# Patient Record
Sex: MALE | Race: WHITE | Employment: FULL TIME | URBAN - METROPOLITAN AREA
[De-identification: names, ages, dates, MRNs, and addresses within clinical notes are randomized per-mention and may not be internally consistent; named-entity substitution may affect disease eponyms.]

---

## 2017-01-22 ENCOUNTER — APPOINTMENT (OUTPATIENT)
Dept: GENERAL RADIOLOGY | Facility: HOSPITAL | Age: 52
DRG: 202 | End: 2017-01-22
Attending: EMERGENCY MEDICINE
Payer: COMMERCIAL

## 2017-01-22 ENCOUNTER — APPOINTMENT (OUTPATIENT)
Dept: CT IMAGING | Facility: HOSPITAL | Age: 52
DRG: 202 | End: 2017-01-22
Attending: EMERGENCY MEDICINE
Payer: COMMERCIAL

## 2017-01-22 ENCOUNTER — HOSPITAL ENCOUNTER (INPATIENT)
Facility: HOSPITAL | Age: 52
LOS: 8 days | Discharge: HOME | DRG: 202 | End: 2017-01-30
Attending: EMERGENCY MEDICINE | Admitting: INTERNAL MEDICINE
Payer: COMMERCIAL

## 2017-01-22 DIAGNOSIS — J45.901 ASTHMA EXACERBATION: Primary | ICD-10-CM

## 2017-01-22 DIAGNOSIS — B97.4 RESPIRATORY SYNCYTIAL VIRUS (RSV): ICD-10-CM

## 2017-01-22 DIAGNOSIS — I10 ESSENTIAL HYPERTENSION: ICD-10-CM

## 2017-01-22 PROBLEM — Z91.81 AT RISK FOR FALLING: Status: ACTIVE | Noted: 2017-01-22

## 2017-01-22 PROBLEM — B33.8 RESPIRATORY SYNCYTIAL VIRUS (RSV): Status: ACTIVE | Noted: 2017-01-22

## 2017-01-22 LAB
ADENOVIRUS PCR:: NEGATIVE
ALBUMIN SERPL-MCNC: 3.8 G/DL (ref 3.5–4.8)
ALP LIVER SERPL-CCNC: 71 U/L (ref 45–117)
ALT SERPL-CCNC: 41 U/L (ref 17–63)
AST SERPL-CCNC: 20 U/L (ref 15–41)
B PERT DNA SPEC QL NAA+PROBE: NEGATIVE
BASOPHILS # BLD AUTO: 0.03 X10(3) UL (ref 0–0.1)
BASOPHILS NFR BLD AUTO: 0.3 %
BILIRUB SERPL-MCNC: 0.4 MG/DL (ref 0.1–2)
BUN BLD-MCNC: 15 MG/DL (ref 8–20)
C PNEUM DNA SPEC QL NAA+PROBE: NEGATIVE
CALCIUM BLD-MCNC: 8.9 MG/DL (ref 8.3–10.3)
CHLORIDE: 102 MMOL/L (ref 101–111)
CO2: 25 MMOL/L (ref 22–32)
CORONAVIRUS 229E PCR:: NEGATIVE
CORONAVIRUS HKU1 PCR:: NEGATIVE
CORONAVIRUS NL63 PCR:: NEGATIVE
CORONAVIRUS OC43 PCR:: NEGATIVE
CREAT BLD-MCNC: 1.18 MG/DL (ref 0.7–1.3)
EOSINOPHIL # BLD AUTO: 0.13 X10(3) UL (ref 0–0.3)
EOSINOPHIL NFR BLD AUTO: 1.4 %
ERYTHROCYTE [DISTWIDTH] IN BLOOD BY AUTOMATED COUNT: 12.7 % (ref 11.5–16)
FLUAV H1 2009 PAND RNA SPEC QL NAA+PROBE: NEGATIVE
FLUAV H1 RNA SPEC QL NAA+PROBE: NEGATIVE
FLUAV H3 RNA SPEC QL NAA+PROBE: NEGATIVE
FLUAV RNA SPEC QL NAA+PROBE: NEGATIVE
FLUBV RNA SPEC QL NAA+PROBE: NEGATIVE
GLUCOSE BLD-MCNC: 102 MG/DL (ref 70–99)
HCT VFR BLD AUTO: 44 % (ref 37–53)
HGB BLD-MCNC: 15.3 G/DL (ref 13–17)
IMMATURE GRANULOCYTE COUNT: 0.08 X10(3) UL (ref 0–1)
IMMATURE GRANULOCYTE RATIO %: 0.9 %
LACTIC ACID: 3 MMOL/L (ref 0.5–2)
LYMPHOCYTES # BLD AUTO: 1 X10(3) UL (ref 0.9–4)
LYMPHOCYTES NFR BLD AUTO: 10.9 %
M PROTEIN MFR SERPL ELPH: 7.2 G/DL (ref 6.1–8.3)
MCH RBC QN AUTO: 32.1 PG (ref 27–33.2)
MCHC RBC AUTO-ENTMCNC: 34.8 G/DL (ref 31–37)
MCV RBC AUTO: 92.4 FL (ref 80–99)
METAPNEUMOVIRUS PCR:: NEGATIVE
MONOCYTES # BLD AUTO: 0.61 X10(3) UL (ref 0.1–0.6)
MONOCYTES NFR BLD AUTO: 6.6 %
MYCOPLASMA PNEUMONIA PCR:: NEGATIVE
NEUTROPHIL ABS PRELIM: 7.36 X10 (3) UL (ref 1.3–6.7)
NEUTROPHILS # BLD AUTO: 7.36 X10(3) UL (ref 1.3–6.7)
NEUTROPHILS NFR BLD AUTO: 79.9 %
PARAINFLUENZA 1 PCR:: NEGATIVE
PARAINFLUENZA 2 PCR:: NEGATIVE
PARAINFLUENZA 3 PCR:: NEGATIVE
PARAINFLUENZA 4 PCR:: NEGATIVE
PLATELET # BLD AUTO: 172 10(3)UL (ref 150–450)
POTASSIUM SERPL-SCNC: 4 MMOL/L (ref 3.6–5.1)
PROCALCITONIN SERPL-MCNC: <0.11 NG/ML (ref ?–0.11)
RBC # BLD AUTO: 4.76 X10(6)UL (ref 4.3–5.7)
RED CELL DISTRIBUTION WIDTH-SD: 42.4 FL (ref 35.1–46.3)
RHINOVIRUS/ENTERO PCR:: NEGATIVE
RSV RNA SPEC QL NAA+PROBE: POSITIVE
SODIUM SERPL-SCNC: 138 MMOL/L (ref 136–144)
WBC # BLD AUTO: 9.2 X10(3) UL (ref 4–13)

## 2017-01-22 PROCEDURE — 87798 DETECT AGENT NOS DNA AMP: CPT | Performed by: EMERGENCY MEDICINE

## 2017-01-22 PROCEDURE — 93010 ELECTROCARDIOGRAM REPORT: CPT

## 2017-01-22 PROCEDURE — 94640 AIRWAY INHALATION TREATMENT: CPT

## 2017-01-22 PROCEDURE — 87040 BLOOD CULTURE FOR BACTERIA: CPT | Performed by: EMERGENCY MEDICINE

## 2017-01-22 PROCEDURE — 87999 UNLISTED MICROBIOLOGY PX: CPT

## 2017-01-22 PROCEDURE — 80053 COMPREHEN METABOLIC PANEL: CPT | Performed by: EMERGENCY MEDICINE

## 2017-01-22 PROCEDURE — 94644 CONT INHLJ TX 1ST HOUR: CPT

## 2017-01-22 PROCEDURE — 85025 COMPLETE CBC W/AUTO DIFF WBC: CPT | Performed by: EMERGENCY MEDICINE

## 2017-01-22 PROCEDURE — 99285 EMERGENCY DEPT VISIT HI MDM: CPT

## 2017-01-22 PROCEDURE — 36415 COLL VENOUS BLD VENIPUNCTURE: CPT

## 2017-01-22 PROCEDURE — 84145 PROCALCITONIN (PCT): CPT | Performed by: EMERGENCY MEDICINE

## 2017-01-22 PROCEDURE — 87581 M.PNEUMON DNA AMP PROBE: CPT | Performed by: EMERGENCY MEDICINE

## 2017-01-22 PROCEDURE — 96374 THER/PROPH/DIAG INJ IV PUSH: CPT

## 2017-01-22 PROCEDURE — 87486 CHLMYD PNEUM DNA AMP PROBE: CPT | Performed by: EMERGENCY MEDICINE

## 2017-01-22 PROCEDURE — 93005 ELECTROCARDIOGRAM TRACING: CPT

## 2017-01-22 PROCEDURE — 83605 ASSAY OF LACTIC ACID: CPT | Performed by: EMERGENCY MEDICINE

## 2017-01-22 PROCEDURE — 71275 CT ANGIOGRAPHY CHEST: CPT

## 2017-01-22 PROCEDURE — 71010 XR CHEST AP/PA (1 VIEW) (CPT=71010): CPT

## 2017-01-22 PROCEDURE — 87633 RESP VIRUS 12-25 TARGETS: CPT | Performed by: EMERGENCY MEDICINE

## 2017-01-22 RX ORDER — IPRATROPIUM BROMIDE AND ALBUTEROL SULFATE 2.5; .5 MG/3ML; MG/3ML
3 SOLUTION RESPIRATORY (INHALATION) ONCE
Status: COMPLETED | OUTPATIENT
Start: 2017-01-22 | End: 2017-01-22

## 2017-01-22 RX ORDER — GARLIC EXTRACT 500 MG
1 CAPSULE ORAL DAILY
Status: DISCONTINUED | OUTPATIENT
Start: 2017-01-22 | End: 2017-01-22

## 2017-01-22 RX ORDER — LOPERAMIDE HYDROCHLORIDE 2 MG/1
2 CAPSULE ORAL 4 TIMES DAILY PRN
Status: DISCONTINUED | OUTPATIENT
Start: 2017-01-22 | End: 2017-01-23

## 2017-01-22 RX ORDER — MAGNESIUM OXIDE 400 MG (241.3 MG MAGNESIUM) TABLET
3 TABLET NIGHTLY
Status: DISCONTINUED | OUTPATIENT
Start: 2017-01-22 | End: 2017-01-30

## 2017-01-22 RX ORDER — METHYLPREDNISOLONE SODIUM SUCCINATE 125 MG/2ML
125 INJECTION, POWDER, LYOPHILIZED, FOR SOLUTION INTRAMUSCULAR; INTRAVENOUS ONCE
Status: COMPLETED | OUTPATIENT
Start: 2017-01-22 | End: 2017-01-22

## 2017-01-22 RX ORDER — AMOXICILLIN AND CLAVULANATE POTASSIUM 875; 125 MG/1; MG/1
1 TABLET, FILM COATED ORAL EVERY 12 HOURS SCHEDULED
Status: DISCONTINUED | OUTPATIENT
Start: 2017-01-22 | End: 2017-01-30

## 2017-01-22 RX ORDER — PRAVASTATIN SODIUM 20 MG
20 TABLET ORAL NIGHTLY
Status: DISCONTINUED | OUTPATIENT
Start: 2017-01-22 | End: 2017-01-30

## 2017-01-22 RX ORDER — ACYCLOVIR 50 MG/G
OINTMENT TOPICAL
Status: DISCONTINUED | OUTPATIENT
Start: 2017-01-22 | End: 2017-01-30

## 2017-01-22 RX ORDER — AMOXICILLIN AND CLAVULANATE POTASSIUM 875; 125 MG/1; MG/1
1 TABLET, FILM COATED ORAL 2 TIMES DAILY
Status: ON HOLD | COMMUNITY
Start: 2017-01-11 | End: 2017-01-22

## 2017-01-22 RX ORDER — ALBUTEROL SULFATE 2.5 MG/3ML
2.5 SOLUTION RESPIRATORY (INHALATION) EVERY 4 HOURS PRN
Status: DISCONTINUED | OUTPATIENT
Start: 2017-01-22 | End: 2017-01-23

## 2017-01-22 RX ORDER — BENZONATATE 200 MG/1
200 CAPSULE ORAL 3 TIMES DAILY PRN
Status: DISCONTINUED | OUTPATIENT
Start: 2017-01-22 | End: 2017-01-23

## 2017-01-22 RX ORDER — SODIUM CHLORIDE 9 MG/ML
INJECTION, SOLUTION INTRAVENOUS CONTINUOUS
Status: ACTIVE | OUTPATIENT
Start: 2017-01-22 | End: 2017-01-22

## 2017-01-22 RX ORDER — IBUPROFEN 200 MG
200 TABLET ORAL EVERY 6 HOURS PRN
Status: DISCONTINUED | OUTPATIENT
Start: 2017-01-22 | End: 2017-01-23

## 2017-01-22 RX ORDER — PANTOPRAZOLE SODIUM 20 MG/1
20 TABLET, DELAYED RELEASE ORAL
Status: DISCONTINUED | OUTPATIENT
Start: 2017-01-23 | End: 2017-01-30

## 2017-01-22 RX ORDER — DOXEPIN HYDROCHLORIDE 50 MG/1
1 CAPSULE ORAL DAILY
Status: DISCONTINUED | OUTPATIENT
Start: 2017-01-22 | End: 2017-01-30

## 2017-01-22 RX ORDER — CODEINE PHOSPHATE AND GUAIFENESIN 10; 100 MG/5ML; MG/5ML
5 SOLUTION ORAL EVERY 4 HOURS PRN
Status: DISCONTINUED | OUTPATIENT
Start: 2017-01-22 | End: 2017-01-23

## 2017-01-22 RX ORDER — LISINOPRIL 10 MG/1
10 TABLET ORAL DAILY
Status: DISCONTINUED | OUTPATIENT
Start: 2017-01-22 | End: 2017-01-30

## 2017-01-22 RX ORDER — FLUOXETINE HYDROCHLORIDE 20 MG/1
20 CAPSULE ORAL DAILY
Status: DISCONTINUED | OUTPATIENT
Start: 2017-01-22 | End: 2017-01-30

## 2017-01-22 RX ORDER — METHYLPREDNISOLONE 4 MG/1
4 TABLET ORAL AS DIRECTED
Status: ON HOLD | COMMUNITY
End: 2017-01-26

## 2017-01-22 RX ORDER — CETIRIZINE HYDROCHLORIDE 10 MG/1
10 TABLET ORAL DAILY
Status: DISCONTINUED | OUTPATIENT
Start: 2017-01-22 | End: 2017-01-30

## 2017-01-22 RX ORDER — MONTELUKAST SODIUM 10 MG/1
10 TABLET ORAL NIGHTLY
Status: DISCONTINUED | OUTPATIENT
Start: 2017-01-22 | End: 2017-01-30

## 2017-01-22 RX ORDER — GARLIC EXTRACT 500 MG
1 CAPSULE ORAL 2 TIMES DAILY
Status: DISCONTINUED | OUTPATIENT
Start: 2017-01-22 | End: 2017-01-30

## 2017-01-22 RX ORDER — CLONAZEPAM 0.5 MG/1
0.5 TABLET ORAL 2 TIMES DAILY PRN
Status: DISCONTINUED | OUTPATIENT
Start: 2017-01-22 | End: 2017-01-30

## 2017-01-22 RX ORDER — CLONAZEPAM 0.5 MG/1
0.5 TABLET ORAL DAILY
Status: DISCONTINUED | OUTPATIENT
Start: 2017-01-22 | End: 2017-01-22

## 2017-01-23 LAB
ATRIAL RATE: 80 BPM
BUN BLD-MCNC: 14 MG/DL (ref 8–20)
CALCIUM BLD-MCNC: 8.8 MG/DL (ref 8.3–10.3)
CHLORIDE: 103 MMOL/L (ref 101–111)
CO2: 18 MMOL/L (ref 22–32)
CREAT BLD-MCNC: 1.18 MG/DL (ref 0.7–1.3)
ERYTHROCYTE [DISTWIDTH] IN BLOOD BY AUTOMATED COUNT: 12.6 % (ref 11.5–16)
GLUCOSE BLD-MCNC: 111 MG/DL (ref 70–99)
HAV IGM SER QL: 2.1 MG/DL (ref 1.7–3)
HCT VFR BLD AUTO: 43.2 % (ref 37–53)
HGB BLD-MCNC: 15.1 G/DL (ref 13–17)
LACTIC ACID: 6 MMOL/L (ref 0.5–2)
MCH RBC QN AUTO: 32.5 PG (ref 27–33.2)
MCHC RBC AUTO-ENTMCNC: 35 G/DL (ref 31–37)
MCV RBC AUTO: 93.1 FL (ref 80–99)
P AXIS: 9 DEGREES
P-R INTERVAL: 122 MS
PLATELET # BLD AUTO: 167 10(3)UL (ref 150–450)
POTASSIUM SERPL-SCNC: 4 MMOL/L (ref 3.6–5.1)
Q-T INTERVAL: 358 MS
QRS DURATION: 84 MS
QTC CALCULATION (BEZET): 412 MS
R AXIS: 23 DEGREES
RBC # BLD AUTO: 4.64 X10(6)UL (ref 4.3–5.7)
RED CELL DISTRIBUTION WIDTH-SD: 42.9 FL (ref 35.1–46.3)
SODIUM SERPL-SCNC: 137 MMOL/L (ref 136–144)
T AXIS: 34 DEGREES
VENTRICULAR RATE: 80 BPM
WBC # BLD AUTO: 12.2 X10(3) UL (ref 4–13)

## 2017-01-23 PROCEDURE — 94640 AIRWAY INHALATION TREATMENT: CPT

## 2017-01-23 PROCEDURE — 83735 ASSAY OF MAGNESIUM: CPT | Performed by: INTERNAL MEDICINE

## 2017-01-23 PROCEDURE — 80048 BASIC METABOLIC PNL TOTAL CA: CPT | Performed by: INTERNAL MEDICINE

## 2017-01-23 PROCEDURE — 97530 THERAPEUTIC ACTIVITIES: CPT

## 2017-01-23 PROCEDURE — 83605 ASSAY OF LACTIC ACID: CPT | Performed by: INTERNAL MEDICINE

## 2017-01-23 PROCEDURE — 97161 PT EVAL LOW COMPLEX 20 MIN: CPT

## 2017-01-23 PROCEDURE — 85027 COMPLETE CBC AUTOMATED: CPT | Performed by: INTERNAL MEDICINE

## 2017-01-23 RX ORDER — IPRATROPIUM BROMIDE AND ALBUTEROL SULFATE 2.5; .5 MG/3ML; MG/3ML
3 SOLUTION RESPIRATORY (INHALATION)
Status: DISCONTINUED | OUTPATIENT
Start: 2017-01-23 | End: 2017-01-24

## 2017-01-23 RX ORDER — ALBUTEROL SULFATE 2.5 MG/3ML
2.5 SOLUTION RESPIRATORY (INHALATION)
Status: DISCONTINUED | OUTPATIENT
Start: 2017-01-23 | End: 2017-01-23

## 2017-01-23 RX ORDER — METHYLPREDNISOLONE SODIUM SUCCINATE 125 MG/2ML
125 INJECTION, POWDER, LYOPHILIZED, FOR SOLUTION INTRAMUSCULAR; INTRAVENOUS EVERY 8 HOURS
Status: DISCONTINUED | OUTPATIENT
Start: 2017-01-23 | End: 2017-01-25

## 2017-01-23 RX ORDER — CODEINE PHOSPHATE AND GUAIFENESIN 10; 100 MG/5ML; MG/5ML
10 SOLUTION ORAL EVERY 4 HOURS PRN
Status: DISCONTINUED | OUTPATIENT
Start: 2017-01-23 | End: 2017-01-30

## 2017-01-23 RX ORDER — BENZONATATE 200 MG/1
200 CAPSULE ORAL 3 TIMES DAILY
Status: DISCONTINUED | OUTPATIENT
Start: 2017-01-23 | End: 2017-01-30

## 2017-01-23 RX ORDER — IBUPROFEN 600 MG/1
600 TABLET ORAL EVERY 6 HOURS PRN
Status: DISCONTINUED | OUTPATIENT
Start: 2017-01-23 | End: 2017-01-30

## 2017-01-23 RX ORDER — LOPERAMIDE HYDROCHLORIDE 2 MG/1
4 CAPSULE ORAL 4 TIMES DAILY PRN
Status: DISCONTINUED | OUTPATIENT
Start: 2017-01-23 | End: 2017-01-30

## 2017-01-23 RX ORDER — ALBUTEROL SULFATE 2.5 MG/3ML
2.5 SOLUTION RESPIRATORY (INHALATION) EVERY 2 HOUR PRN
Status: DISCONTINUED | OUTPATIENT
Start: 2017-01-23 | End: 2017-01-30

## 2017-01-23 NOTE — H&P
[facility]    PATIENT'S NAME: Alejandro Jorge Luis   ATTENDING PHYSICIAN: Radha De León M.D.    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     ALLERGIES:  To clarithromycin, which caused confusion, although he does tolerate azithromycin; doxycycline edema.  NEUROLOGIC:  Grossly intact. LABORATORY DATA:  Nonfasting blood sugar was 102, BUN was 15 with a creatinine of 1.8, potassium was 4.0, and lactic acid was 3.0. Procalcitonin was less than 0.11.   White count was 9.2 with a hemoglobin of 15.3 and

## 2017-01-23 NOTE — ED PROVIDER NOTES
Patient Seen in: [facility] Emergency Department    History   Patient presents with:  Cough/URI    Stated Complaint: Cough, URI    HPI    52-year-old male with a history of anxiety, reflux, hypertension, sinusitis presents to the emergency departmen HISTORY      Comment tubes for bile drainage       Medications :   Dextromethorphan-Menthol (DELSYM COUGH RELIEF MT),  Use as directed in the mouth or throat. methylPREDNISolone 4 MG Oral Tablet Therapy Pack,  Take 4 mg by mouth As Directed.    Amoxicilli every morning before breakfast.   TESSALON 200 MG OR CAPS,  1 CAPSULE 3 TIMES DAILY AS NEEDED   VITAMIN D OR,  None Entered       Family History   Problem Relation Age of Onset   • Learning Disability Daughter    • Neurological Disorder Daughter      Asper Current:/72 mmHg  Pulse 72  Temp(Src) 97.1 °F (36.2 °C) (Temporal)  Resp 20  Ht 175.3 cm (5' 9\")  Wt 90.719 kg  BMI 29.52 kg/m2  SpO2 97%        Physical Exam    General:  Patient is alert and oriented x3. No acute distress.   Well-developed a Abnormality         Status                     ---------                               -----------         ------                     CBC W/ DIFFERENTIAL[[id number]]          Abnormal            Final result                 Plea Approved by: Elias Mena MD            Ct Angiography, Chest (cpt=71275)    1/22/2017  PROCEDURE:  CT ANGIOGRAM OF THE CHEST  COMPARISON:  [facility] CT ANGIOGRAPHY, CHEST (CPT=71275), 7/11/2013, 15:03.   INDICATIONS:  Cough, URI  TECHNIQUE:  IV contrast-enha telemetry floor.     MDM           Disposition and Plan     Clinical Impression:  Asthma exacerbation  (primary encounter diagnosis)  Respiratory syncytial virus (RSV)  Essential hypertension    Disposition:  Admit    Follow-up:  No follow-up provider speci

## 2017-01-23 NOTE — PAYOR COMM NOTE
Attending Physician: Jie Rutherford MD    Review Type: ADMISSION   Reviewer: Tomeka VASQUEZ       Date: January 23, 2017 - 12:23 PM  Payor: [address] [place] [address]  Authorization Number: N/A  Admit date: 1/22/2017  6:30 PM   Admitted from Emergency Dept wheezing.            MEDICATIONS ADMINISTERED IN LAST 1 DAY:  potassium chloride 20 mEq in sodium chloride 0.45 % 1,000 mL infusion     Date Action Dose Route User    1/23/2017 0854 Rate/Dose Change (none) Intravenous Kenyatta Quintero RN    1/23/2017 0947 (DUONEB) nebulizer solution 3 mL     Date Action Dose Route User    1/22/2017 2115 Given 3 mL Nebulization Contreras De La Cruz RCP      MethylPREDNISolone Sodium Succ (Solu-MEDROL) injection 125 mg     Date Action Dose Route User    1/22/2017 2116 Given 125 mg ASSESSMENT AND PLAN:     3      A 71-year-old white male with significant respiratory distress, even though not bacterial, the patient is obviously in acute distress.  We will admit him and begin respiratory therapy, steroids, and oxygen, and consul

## 2017-01-23 NOTE — PROGRESS NOTES
NURSING ADMISSION NOTE      Patient admitted via Cart  Oriented to room. Safety precautions initiated. Bed in low position. Call light in reach. Admission navigator completed. Pt updated on plan of care and verbalizes understanding.  Pt denies karina

## 2017-01-23 NOTE — CM/SW NOTE
01/23/17 1600   CM/SW Screening   Referral [address] staff; Chart review;Nursing rounds   Patient lives with Spouse   Patient Status Prior to Admission   Independent with ADLs and Mobility Yes     Patient was screened d

## 2017-01-23 NOTE — ED INITIAL ASSESSMENT (HPI)
Patient here with cough, wheezing, sinus pain/pressure, right ear pain, throat pain. Symptoms have been on and off since October. Patient states he gets sick for about 3 weeks then gets better for 2 weeks.   Patient states this is the fourth time he has b

## 2017-01-23 NOTE — CONSULTS
Pulmonary H&P/Consult       NAME: Bernard Lopez - ROOM: [address] [place] [address] - MRN: [id number] - Age: 49year old - :  1965    Date of Admission: 2017  6:30 PM  Admission Diagnosis: Respiratory syncytial virus (RSV) [B97.4]  Essential hypertension [I10] Comment hemorrhoids    HERNIA SURGERY  3/7/12    Comment laparoscopic right inguinal hernia repair with mesh by Dr. Ca Celestin @ [address] HERNIA,5+Y/O,REDUCIBL      APPENDECTOMY      CHOLECYSTECTOMY  3/3/2008    Comment with (PRINIVIL,ZESTRIL) 10 MG Oral Tab Take 10 mg by mouth daily. Disp:  Rfl:  1/22/2017 at 0800   Lovastatin 40 MG Oral Tab Take 40 mg by mouth nightly.  Disp:  Rfl:  1/21/2017 at 2200   Loperamide HCl (IMODIUM) 2 MG Oral Cap Take 2 mg by mouth 4 (four) times d Disorder Daughter      Aspergers   • Allergies Son      peanut   • Eye Problems Daughter      eye muscle surgery   • Ear Problems Daughter      hearing loss   • Cancer Father      Leukemia   • Hypertension Father    • Breast Cancer Mother    • Arthritis Mo Albuterol Sulfate (VENTOLIN) (2.5 MG/3ML) 0.083% Inhalation Nebu Soln Take 2.5 mg by nebulization every 4 (four) hours as needed for Wheezing.  Disp:  Rfl:    ClonazePAM (KLONOPIN) 0.5 MG Oral Tab Take 0.5 mg by mouth 2 (two) times daily as needed for Anx vision   HEENT:  See above  RESPIRATORY:  See above   CARDIOVASCULAR:  denies current chest pain   GI: + abdominal pain- due to coughing  :  denies dysuria or changes in stream   MUSCULOSKELETAL:  denies back pain   NEURO:  denies headaches, no strokes o Regular rate and rhythm, S1 and S2 normal, no murmur, rub   or gallop   Abdomen:     Soft, non-tender, bowel sounds active all four quadrants,     no masses, no organomegaly   Extremities:   Extremities normal, atraumatic, no cyanosis or edema   Pulses: to control asthma  5.  Sinusitis  -given chronicity may benefit from ENT consult as inpt or opt                   Christine Tena  [facility] Pulmonary and Critical Care

## 2017-01-23 NOTE — PHYSICAL THERAPY NOTE
PHYSICAL THERAPY QUICK EVALUATION - INPATIENT    Room Number: 525/525-A  Evaluation Date: 1/23/2017  Presenting Problem: asthma exacerbation  Physician Order: PT Eval and Treat    Problem List  Principal Problem:    Asthma exacerbation  Active Problems: fall risk    WEIGHT BEARING RESTRICTION  Weight Bearing Restriction: None                PAIN ASSESSMENT  Ratin  Location: Chest, back, and abdomen  Management Techniques: Activity promotion; Body mechanics;Breathing techniques;Relaxation;Repositioning with no device for first 50 feet and using IV pole for balance for remaining 100 feet. Patient reported feeling lightheaded during ambulation, recovered with rest breaks. Patient O2 SAT measured at 94% upon return to room.  Patient transferred standing to s

## 2017-01-23 NOTE — PROGRESS NOTES
01/23/17 0730   Provider Notification   Reason for Communication Critical value   Test/Procedure Name Lactic Acid 6.0   Provider Name Dr. Lucho Kim   Method of Communication Page   Response Waiting for response   Notification Time [id number]     Rn notified by

## 2017-01-23 NOTE — PLAN OF CARE
DISCHARGE PLANNING    • Discharge to home or other facility with appropriate resources Progressing        PAIN - ADULT    • Verbalizes/displays adequate comfort level or patient's stated pain goal Progressing        Patient/Family Goals    • Patient/Family plan: home with family    Back up discharge plan: home with family

## 2017-01-24 LAB
ALLENS TEST: POSITIVE
ARTERIAL BLD GAS O2 SATURATION: 94 % (ref 92–100)
ARTERIAL BLOOD GAS BASE EXCESS: -3.1
ARTERIAL BLOOD GAS HCO3: 20.8 MEQ/L (ref 22–26)
ARTERIAL BLOOD GAS PCO2: 34 MM HG (ref 35–45)
ARTERIAL BLOOD GAS PH: 7.41 (ref 7.35–7.45)
ARTERIAL BLOOD GAS PO2: 68 MM HG (ref 80–105)
CALCULATED O2 SATURATION: 94 % (ref 92–100)
CARBOXYHEMOGLOBIN: 1.1 % SAT (ref 0–3)
IONIZED CALCIUM: 1.24 MMOL/L (ref 1.12–1.32)
L/M: 3 L/MIN
LACTIC ACID ARTERIAL: 4.7 MMOL/L (ref 0.5–2)
LACTIC ACID: 3.2 MMOL/L (ref 0.5–2)
METHEMOGLOBIN: 0.7 % SAT (ref 0.4–1.5)
PATIENT TEMPERATURE: 98.4 F
POTASSIUM BLOOD GAS: 4.3 MMOL/L (ref 3.6–5.1)
SODIUM BLOOD GAS: 136 MMOL/L (ref 136–144)
TOTAL HEMOGLOBIN: 14 G/DL (ref 13.2–17.3)

## 2017-01-24 PROCEDURE — 94664 DEMO&/EVAL PT USE INHALER: CPT

## 2017-01-24 PROCEDURE — 82330 ASSAY OF CALCIUM: CPT | Performed by: INTERNAL MEDICINE

## 2017-01-24 PROCEDURE — 94640 AIRWAY INHALATION TREATMENT: CPT

## 2017-01-24 PROCEDURE — 85018 HEMOGLOBIN: CPT | Performed by: INTERNAL MEDICINE

## 2017-01-24 PROCEDURE — 83605 ASSAY OF LACTIC ACID: CPT | Performed by: INTERNAL MEDICINE

## 2017-01-24 PROCEDURE — 82375 ASSAY CARBOXYHB QUANT: CPT | Performed by: INTERNAL MEDICINE

## 2017-01-24 PROCEDURE — 83050 HGB METHEMOGLOBIN QUAN: CPT | Performed by: INTERNAL MEDICINE

## 2017-01-24 PROCEDURE — 84132 ASSAY OF SERUM POTASSIUM: CPT | Performed by: INTERNAL MEDICINE

## 2017-01-24 PROCEDURE — 36600 WITHDRAWAL OF ARTERIAL BLOOD: CPT | Performed by: INTERNAL MEDICINE

## 2017-01-24 PROCEDURE — 82803 BLOOD GASES ANY COMBINATION: CPT | Performed by: INTERNAL MEDICINE

## 2017-01-24 PROCEDURE — 84295 ASSAY OF SERUM SODIUM: CPT | Performed by: INTERNAL MEDICINE

## 2017-01-24 RX ORDER — BUDESONIDE 0.5 MG/2ML
0.5 INHALANT ORAL 2 TIMES DAILY
Status: DISCONTINUED | OUTPATIENT
Start: 2017-01-24 | End: 2017-01-30

## 2017-01-24 RX ORDER — IPRATROPIUM BROMIDE AND ALBUTEROL SULFATE 2.5; .5 MG/3ML; MG/3ML
3 SOLUTION RESPIRATORY (INHALATION)
Status: DISCONTINUED | OUTPATIENT
Start: 2017-01-24 | End: 2017-01-26

## 2017-01-24 NOTE — PROGRESS NOTES
[id number] [facility] Patient Status:  Inpatient    1965 MRN [id number]   [facility] 5NW-A Attending Jabier Bazan MD   Hosp Day # 2 PCP Eduardo Nobles MD     Pulm / Critical Care Progress Note     S: pt reports on atraumatic. Lungs: coarse with some exp wheezing   Chest wall: No tenderness or deformity. Heart: slightly tachy, regular, normal S1S2, no murmur. Abdomen: soft, non-tender, non-distended, positive BS.    Extremity: no edema       Recent Labs   Lab  0

## 2017-01-24 NOTE — PLAN OF CARE
DISCHARGE PLANNING    • Discharge to home or other facility with appropriate resources Progressing        Impaired Functional Mobility    • Achieve highest/safest level of mobility/gait Progressing        PAIN - ADULT    • Verbalizes/displays adequate comf

## 2017-01-24 NOTE — PROGRESS NOTES
Patient presented semi-supine in bed; VSS and agreeable to participate. Transferred supine>sit and sit>stand w/ supervision assist.  Ambulated 300' w/IV Pole and supervision assist.  During activity, patient reported new onset 5/10 LLQ back pain.   Upon co

## 2017-01-24 NOTE — PROGRESS NOTES
[facility]    Progress Note    Wilrfido Morales Patient Status:  Inpatient    1965 MRN [id number]   [facility] 5NW-A Attending Anastasiia Don MD   Hosp Day # 2 PCP Alicia Mayes MD       SUBJECTIVE:  TRIED TO SPACE [place] [address] PRN   Fluticasone Furoate-Vilanterol (BREO ELLIPTA) 200-25 MCG/INH inhaler 1 puff 1 puff Inhalation Daily   benzonatate (TESSALON) cap 200 mg 200 mg Oral TID   acyclovir (ZOVIRAX) 5 % ointment  Topical Q4H While awake   cholecalciferol (VITAMIN D3) cap/tab

## 2017-01-25 LAB — GLUCOSE BLD-MCNC: 132 MG/DL (ref 65–99)

## 2017-01-25 PROCEDURE — 94640 AIRWAY INHALATION TREATMENT: CPT

## 2017-01-25 PROCEDURE — 82962 GLUCOSE BLOOD TEST: CPT

## 2017-01-25 PROCEDURE — 84132 ASSAY OF SERUM POTASSIUM: CPT | Performed by: INTERNAL MEDICINE

## 2017-01-25 PROCEDURE — 87081 CULTURE SCREEN ONLY: CPT | Performed by: INTERNAL MEDICINE

## 2017-01-25 PROCEDURE — 87430 STREP A AG IA: CPT | Performed by: INTERNAL MEDICINE

## 2017-01-25 RX ORDER — METHYLPREDNISOLONE SODIUM SUCCINATE 125 MG/2ML
60 INJECTION, POWDER, LYOPHILIZED, FOR SOLUTION INTRAMUSCULAR; INTRAVENOUS EVERY 8 HOURS
Status: DISCONTINUED | OUTPATIENT
Start: 2017-01-25 | End: 2017-01-26

## 2017-01-25 RX ORDER — FUROSEMIDE 10 MG/ML
20 INJECTION INTRAMUSCULAR; INTRAVENOUS ONCE
Status: COMPLETED | OUTPATIENT
Start: 2017-01-25 | End: 2017-01-25

## 2017-01-25 NOTE — PROGRESS NOTES
[address] [facility] Patient Status:  Inpatient    1965 MRN [id number]   [facility] 5NW-A Attending Ricky Lawson MD   Hosp Day # 3 PCP Elida Valles MD     Pulm / Critical Care Progress Note     S:  Pt states he coarseness   Chest wall: No tenderness or deformity. Heart: Regular rate and rhythm, normal S1S2, no murmur. Abdomen: soft, non-tender, non-distended, positive BS.    Extremity: no edema         Recent Labs   Lab  01/22/17   1900  01/23/17   0658   WBC

## 2017-01-25 NOTE — PROGRESS NOTES
[facility]    Progress Note    Radha Trevino Patient Status:  Inpatient    1965 MRN [id number]   [facility] 5NW-A Attending Milka Galindo MD   Hosp Day # 3 PCP Radha De León MD       SUBJECTIVE:  NOTES IMPROVED PEAK F MCG/INH inhaler 1 puff 1 puff Inhalation Daily   benzonatate (TESSALON) cap 200 mg 200 mg Oral TID   acyclovir (ZOVIRAX) 5 % ointment  Topical Q4H While awake   cholecalciferol (VITAMIN D3) cap/tab 2,000 Units 2,000 Units Oral Daily   cetirizine (ZYRTEC) t

## 2017-01-25 NOTE — PLAN OF CARE
DISCHARGE PLANNING    • Discharge to home or other facility with appropriate resources Progressing        Impaired Functional Mobility    • Achieve highest/safest level of mobility/gait Progressing        PAIN - ADULT    • Verbalizes/displays adequate comf Asthma exacerbation     Respiratory syncytial virus (RSV)     Essential hypertension     At risk for falling       Active issue(s) requiring resolution prior to discharge: IV fluids/steroids, oxygen requirements     Anticipated discharge date: TBD    Curr

## 2017-01-25 NOTE — PAYOR COMM NOTE
Attending Physician: Milka Galindo MD    Review Type: CONTINUED STAY  Reviewer: Keith VASQUEZ     Date: January 25, 2017 - 10:59 AM  Payor: [address] [place] [address]  Authorization Number: N/A  Admit date: 1/22/2017  6:30 PM        REVIEWER COMMENTS Von Marr, YANE      ipratropium-albuterol (DUONEB) nebulizer solution 3 mL q 3 hr      Date Action Dose Route User    1/25/2017 1048 Given 3 mL Nebulization Elizabeth Fallon RCP    1/25/2017 4848 Given 3 mL Nebulization [place], [place], [facility]    1/

## 2017-01-26 ENCOUNTER — APPOINTMENT (OUTPATIENT)
Dept: GENERAL RADIOLOGY | Facility: HOSPITAL | Age: 52
DRG: 202 | End: 2017-01-26
Attending: INTERNAL MEDICINE
Payer: COMMERCIAL

## 2017-01-26 LAB
BASOPHILS # BLD AUTO: 0.01 X10(3) UL (ref 0–0.1)
BASOPHILS NFR BLD AUTO: 0.1 %
BUN BLD-MCNC: 28 MG/DL (ref 8–20)
CALCIUM BLD-MCNC: 8.9 MG/DL (ref 8.3–10.3)
CHLORIDE: 101 MMOL/L (ref 101–111)
CO2: 29 MMOL/L (ref 22–32)
CREAT BLD-MCNC: 1.12 MG/DL (ref 0.7–1.3)
EOSINOPHIL # BLD AUTO: 0 X10(3) UL (ref 0–0.3)
EOSINOPHIL NFR BLD AUTO: 0 %
ERYTHROCYTE [DISTWIDTH] IN BLOOD BY AUTOMATED COUNT: 12.4 % (ref 11.5–16)
GLUCOSE BLD-MCNC: 152 MG/DL (ref 70–99)
HCT VFR BLD AUTO: 40.9 % (ref 37–53)
HGB BLD-MCNC: 13.7 G/DL (ref 13–17)
IMMATURE GRANULOCYTE COUNT: 0.25 X10(3) UL (ref 0–1)
IMMATURE GRANULOCYTE RATIO %: 1.9 %
LYMPHOCYTES # BLD AUTO: 0.81 X10(3) UL (ref 0.9–4)
LYMPHOCYTES NFR BLD AUTO: 6.2 %
MCH RBC QN AUTO: 32 PG (ref 27–33.2)
MCHC RBC AUTO-ENTMCNC: 33.5 G/DL (ref 31–37)
MCV RBC AUTO: 95.6 FL (ref 80–99)
MONOCYTES # BLD AUTO: 0.97 X10(3) UL (ref 0.1–0.6)
MONOCYTES NFR BLD AUTO: 7.4 %
NEUTROPHIL ABS PRELIM: 11.07 X10 (3) UL (ref 1.3–6.7)
NEUTROPHILS # BLD AUTO: 11.07 X10(3) UL (ref 1.3–6.7)
NEUTROPHILS NFR BLD AUTO: 84.4 %
PLATELET # BLD AUTO: 153 10(3)UL (ref 150–450)
POTASSIUM SERPL-SCNC: 4.4 MMOL/L (ref 3.6–5.1)
POTASSIUM SERPL-SCNC: 4.5 MMOL/L (ref 3.6–5.1)
RBC # BLD AUTO: 4.28 X10(6)UL (ref 4.3–5.7)
RED CELL DISTRIBUTION WIDTH-SD: 43.6 FL (ref 35.1–46.3)
SODIUM SERPL-SCNC: 138 MMOL/L (ref 136–144)
WBC # BLD AUTO: 13.1 X10(3) UL (ref 4–13)

## 2017-01-26 PROCEDURE — 85025 COMPLETE CBC W/AUTO DIFF WBC: CPT | Performed by: INTERNAL MEDICINE

## 2017-01-26 PROCEDURE — 80048 BASIC METABOLIC PNL TOTAL CA: CPT | Performed by: INTERNAL MEDICINE

## 2017-01-26 PROCEDURE — 94640 AIRWAY INHALATION TREATMENT: CPT

## 2017-01-26 PROCEDURE — 71020 XR CHEST PA + LAT CHEST (CPT=71020): CPT

## 2017-01-26 PROCEDURE — 84132 ASSAY OF SERUM POTASSIUM: CPT | Performed by: INTERNAL MEDICINE

## 2017-01-26 RX ORDER — BUDESONIDE AND FORMOTEROL FUMARATE DIHYDRATE 160; 4.5 UG/1; UG/1
2 AEROSOL RESPIRATORY (INHALATION) 2 TIMES DAILY
Qty: 1 INHALER | Refills: 1 | Status: SHIPPED | OUTPATIENT
Start: 2017-01-26 | End: 2017-02-08 | Stop reason: ALTCHOICE

## 2017-01-26 RX ORDER — PREDNISONE 10 MG/1
TABLET ORAL
Qty: 30 TABLET | Refills: 0 | Status: SHIPPED | OUTPATIENT
Start: 2017-01-26 | End: 2017-02-08 | Stop reason: ALTCHOICE

## 2017-01-26 RX ORDER — IPRATROPIUM BROMIDE AND ALBUTEROL SULFATE 2.5; .5 MG/3ML; MG/3ML
3 SOLUTION RESPIRATORY (INHALATION)
Status: DISCONTINUED | OUTPATIENT
Start: 2017-01-26 | End: 2017-01-30

## 2017-01-26 RX ORDER — BENZONATATE 200 MG/1
200 CAPSULE ORAL 3 TIMES DAILY PRN
Qty: 30 CAPSULE | Refills: 0 | Status: SHIPPED | OUTPATIENT
Start: 2017-01-26 | End: 2017-02-15 | Stop reason: ALTCHOICE

## 2017-01-26 RX ORDER — PREDNISONE 20 MG/1
40 TABLET ORAL
Status: DISCONTINUED | OUTPATIENT
Start: 2017-01-27 | End: 2017-01-28

## 2017-01-26 NOTE — PLAN OF CARE
SPO2% on Room Air at rest: 98%    SPO2% Ambulation on Room Air: 96%    SPO2% Ambulation on O2: 96% on 0 Liters per minute (room air)

## 2017-01-26 NOTE — PLAN OF CARE
DISCHARGE PLANNING    • Discharge to home or other facility with appropriate resources Progressing        Impaired Functional Mobility    • Achieve highest/safest level of mobility/gait Progressing        PAIN - ADULT    • Verbalizes/displays adequate comf ibuprofen administered with relief. Eye drops ordered for dry eyes, administered with relief. Patient updated on POC, rounded on routinely.

## 2017-01-26 NOTE — PROGRESS NOTES
Patient presented sitting EOB; VSS (98% O2/RA) and agreeable to participate.   Transferred sit >stand with supervision assist.  Ambulated 350' w/IV pole and stand by assist.  Patient maintained 96% O2/RA throughout activity, but required numerous standing r

## 2017-01-26 NOTE — PROGRESS NOTES
[address] [facility] Patient Status:  Inpatient    1965 MRN [id number]   [facility] 5NW-A Attending Harriet Odonnell MD   [facility] Day # 4 PCP Kely Smiley MD     Pulm / Critical Care Progress Note     S: pt feeling be tenderness or deformity. Heart: Regular rate and rhythm, normal S1S2, no murmur. Abdomen: soft, non-tender, non-distended, positive BS.    Extremity: no edema         Recent Labs   Lab  01/26/17   0645   WBC  13.1*   HGB  13.7   HCT  40.9   PLT  153.0

## 2017-01-27 ENCOUNTER — APPOINTMENT (OUTPATIENT)
Dept: GENERAL RADIOLOGY | Facility: HOSPITAL | Age: 52
DRG: 202 | End: 2017-01-27
Attending: INTERNAL MEDICINE
Payer: COMMERCIAL

## 2017-01-27 PROCEDURE — 94664 DEMO&/EVAL PT USE INHALER: CPT

## 2017-01-27 PROCEDURE — 71110 X-RAY EXAM RIBS BIL 3 VIEWS: CPT

## 2017-01-27 PROCEDURE — 94640 AIRWAY INHALATION TREATMENT: CPT

## 2017-01-27 RX ORDER — HYDROCODONE BITARTRATE AND ACETAMINOPHEN 5; 325 MG/1; MG/1
1 TABLET ORAL EVERY 6 HOURS PRN
Status: DISCONTINUED | OUTPATIENT
Start: 2017-01-27 | End: 2017-01-30

## 2017-01-27 NOTE — PLAN OF CARE
DISCHARGE PLANNING    • Discharge to home or other facility with appropriate resources Progressing        Impaired Functional Mobility    • Achieve highest/safest level of mobility/gait Progressing        PAIN - ADULT    • Verbalizes/displays adequate comf intermittently. Patient with strong cough, prn cough suppressant administered as ordered with relief. Patient updated on POC, rounded on routinely.

## 2017-01-27 NOTE — PAYOR COMM NOTE
Attending Physician: Milka Galindo MD    Review Type: CONTINUED STAY  Reviewer: Keith VASQUEZ     Date: January 27, 2017 - 11:56 AM  Payor: [address] [place] [address]  Authorization Number: [id number]  Admit date: 1/22/2017  6:30 PM        REVIEWER ANDRESSA 1/26/2017 2345 Given 3 mL Nebulization Odessa Jaquez Fall    1/26/2017 1928 Given 3 mL Nebulization Fuad Bowman RCP    1/26/2017 1522 Given 3 mL Nebulization Fuad Bowman RCP      MethylPREDNISolone Sodium Succ (Solu-MEDROL) injection 60 mg     Date A

## 2017-01-27 NOTE — PLAN OF CARE
SPO2% on Room Air at rest: 97%    SPO2% Ambulation on Room Air: 95%    SPO2% Ambulation on O2: 95% on 0 Liters per minute (room air)

## 2017-01-27 NOTE — PROGRESS NOTES
[facility]    Progress Note    Georgiana Babb Patient Status:  Inpatient    1965 MRN [id number]   [facility] 5NW-A Attending Bre Snow MD   Hosp Day # 4 PCP Fiona Henriquez MD       SUBJECTIVE:  PT DEVELOPED EDEMA LA bases.           Dictated by: Carmen Contreras MD on 1/26/2017 at 8:52       Approved by:  Carmen Contreras MD         Meds:     Current Facility-Administered Medications:  Hypromellose (NATURAL TEARS) 0.4 % ophthalmic solution 1 drop 1 drop Both Eyes QID PRN Essential hypertension, benign     IBS (irritable bowel syndrome)     GERD (gastroesophageal reflux disease)     Recurrent sinusitis     NATALYA (obstructive sleep apnea)     Vitamin D deficiency     Inguinal hernia, right     Status post right inguinal hernia

## 2017-01-27 NOTE — PROGRESS NOTES
Pulmonary Progress Note      NAME: Morris Wynn - ROOM: 117/433-D - MRN: [id number] - Age: 49year old - : 1965    Assessment/Plan:  1.  Dyspnea/hypoxia/Cough - CTA PE protocol negative  -due to asthma exacerbation brought on by RSV and ?sinus inf 3 mg Oral Nightly   • Montelukast Sodium  10 mg Oral Nightly   • multivitamin  1 tablet Oral Daily   • Pantoprazole Sodium  20 mg Oral QAM AC   • Amoxicillin-Pot Clavulanate  1 tablet Oral 2 times per day   • Acidophilus/Pectin  1 capsule Oral BID     Munir Hutson

## 2017-01-27 NOTE — PROGRESS NOTES
[facility]    Progress Note    Bernard Lopez Patient Status:  Inpatient    1965 MRN [id number]   [facility] 5NW-A Attending Arcadio Bautista MD   Hosp Day # 5 PCP Catalina Dobbins MD       SUBJECTIVE:  CALMER    OBJECTIVE: (PULMICORT) 0.5 MG/2ML nebulizer solution 0.5 mg 0.5 mg Nebulization BID   albuterol Sulfate (VENTOLIN) (5 MG/ML) 0.5% nebulizer solution 10 mg 10 mg Nebulization Q4H PRN   Loperamide HCl (IMODIUM) cap 4 mg 4 mg Oral QID PRN   ibuprofen (MOTRIN) tab 600 mg RIB AREA PAIN-NO OBVIOUS FX; DDX MUSCLE STRAIN OR PLEURISY  ASTHMA EXACERBATION-SEVERE, SLOW RECOVERY  EDEMA-BETTER  SORE THROAT-STREP CULTURE NEG  GENL-GRADUAL IMPROVEMENT; NOT READY TODAY FOR TRANSFER        Catalina Dobbins  1/27/2017  4:16 PM

## 2017-01-28 ENCOUNTER — APPOINTMENT (OUTPATIENT)
Dept: CV DIAGNOSTICS | Facility: HOSPITAL | Age: 52
DRG: 202 | End: 2017-01-28
Attending: INTERNAL MEDICINE
Payer: COMMERCIAL

## 2017-01-28 PROCEDURE — 93306 TTE W/DOPPLER COMPLETE: CPT

## 2017-01-28 PROCEDURE — 94640 AIRWAY INHALATION TREATMENT: CPT

## 2017-01-28 PROCEDURE — 94664 DEMO&/EVAL PT USE INHALER: CPT

## 2017-01-28 PROCEDURE — 93306 TTE W/DOPPLER COMPLETE: CPT | Performed by: INTERNAL MEDICINE

## 2017-01-28 RX ORDER — HYDROCODONE BITARTRATE AND ACETAMINOPHEN 5; 325 MG/1; MG/1
1 TABLET ORAL EVERY 6 HOURS PRN
Qty: 30 TABLET | Refills: 0 | Status: SHIPPED | OUTPATIENT
Start: 2017-01-28 | End: 2017-02-15 | Stop reason: ALTCHOICE

## 2017-01-28 RX ORDER — SUCRALFATE ORAL 1 G/10ML
1 SUSPENSION ORAL
Status: DISCONTINUED | OUTPATIENT
Start: 2017-01-28 | End: 2017-01-30

## 2017-01-28 RX ORDER — CODEINE PHOSPHATE AND GUAIFENESIN 10; 100 MG/5ML; MG/5ML
10 SOLUTION ORAL 4 TIMES DAILY PRN
Qty: 480 ML | Refills: 0 | Status: SHIPPED | OUTPATIENT
Start: 2017-01-28 | End: 2017-02-15 | Stop reason: ALTCHOICE

## 2017-01-28 RX ORDER — AMOXICILLIN AND CLAVULANATE POTASSIUM 875; 125 MG/1; MG/1
1 TABLET, FILM COATED ORAL EVERY 12 HOURS SCHEDULED
Qty: 14 TABLET | Refills: 0 | Status: SHIPPED | OUTPATIENT
Start: 2017-01-28 | End: 2017-02-15 | Stop reason: ALTCHOICE

## 2017-01-28 NOTE — PLAN OF CARE
Received patient a/ox4. Continues to report unrelenting cough and a \"chalky\" feeling in his throat. No other new complaints. Spo2 on RA maintained > 92%. Patient ambulated the halls on RA and spo2 maintained 91-94%.  He was updated on plan of care and delio

## 2017-01-28 NOTE — PROGRESS NOTES
[address] [facility] Patient Status:  Inpatient    1965 MRN [id number]   [facility] 5NW-A Attending Arik Cole MD   [facility] Day # 6 PCP Joana Harden MD     SUBJECTIVE:Multiple concerns - pain Ifeoma Deutsch.   Had one Daily  •  FLUoxetine HCl (PROZAC) cap 20 mg, 20 mg, Oral, Daily  •  lisinopril (PRINIVIL,ZESTRIL) tab 10 mg, 10 mg, Oral, Daily  •  Pravastatin Sodium (PRAVACHOL) tab 20 mg, 20 mg, Oral, Nightly  •  melatonin tab TABS 3 mg, 3 mg, Oral, Nightly  •  [place] includes sprain  - no rib fx  - Ibuprofen prn  5. NATALYA  -noncompliant for the last several years  -need to address as opt as noncompliance w/ CPAP can lead to more difficult to control asthma  6.  Sinusitis  -given chronicity may benefit from ENT consult as

## 2017-01-28 NOTE — PROGRESS NOTES
[facility]    Progress Note    Tiffani Medinaischer Patient Status:  Inpatient    1965 MRN [id number]   [facility] 5NW-A Attending Gianni Rosas MD   [facility] Day # 6 PCP Caryn Martinez MD       SUBJECTIVE:  Ernestina 9387 RIGHT ibuprofen (MOTRIN) tab 600 mg 600 mg Oral Q6H PRN   guaiFENesin-codeine (ROBITUSSIN AC) 100-10 MG/5ML syrup 10 mL 10 mL Oral Q4H PRN   albuterol sulfate (VENTOLIN) (2.5 MG/3ML) 0.083% nebulizer solution 2.5 mg 2.5 mg Nebulization Q2H PRN   Fluticasone Fu AM      Yamila Archer  1/28/2017  9:27 AM

## 2017-01-29 PROCEDURE — 94640 AIRWAY INHALATION TREATMENT: CPT

## 2017-01-29 PROCEDURE — 94660 CPAP INITIATION&MGMT: CPT

## 2017-01-29 RX ORDER — ENOXAPARIN SODIUM 100 MG/ML
40 INJECTION SUBCUTANEOUS DAILY
Status: DISCONTINUED | OUTPATIENT
Start: 2017-01-29 | End: 2017-01-30

## 2017-01-29 RX ORDER — ECHINACEA PURPUREA EXTRACT 125 MG
1 TABLET ORAL
Status: DISCONTINUED | OUTPATIENT
Start: 2017-01-29 | End: 2017-01-30

## 2017-01-29 NOTE — CONSULTS
Name: Best Keyes  Consulting Physician Shamir Juarez of note sent to]: Dr. Caitlin Rg  Reason for Request: Sinus    Onset Date: 1/29/17    CHIEF COMPLAINT:   Sinusitis for years. HISTORY OF PRESENT ILLNESS:  49year old male c/o of sinusitis.   Chronic si Comment bilateral. 1987 1989    RENAL ENDOSCOPY       MED:     No current facility-administered medications on file prior to encounter.   Current Outpatient Prescriptions on File Prior to Encounter:  Fexofenadine HCl (ALLEGRA) 180 MG Oral Tab Take 180 mg by Comment:anxious  Doxycycline               Levaquin                Swelling      Social History   Marital Status:   Spouse Name: Demetrio Lou    Years of Education: 14  Number of Children: [address] PHYSICAL EXAMINATION:  General:  WD WN WM. Voice normal, full sentences. Neuro/Psych:  AOx3, NAD. Eyes:  Anicteric, EOMI, no spontaneous nystagmus. Skin: No skin lesions scalp/face. Musculoskeletal:  Normocephalic.   Sinuses nontender to call or go to the emergency room if bleeding recurs. Emma Ge M.D.   Otolaryngology - Head & Neck Surgery

## 2017-01-29 NOTE — PROGRESS NOTES
[address] [facility] Patient Status:  Inpatient    1965 MRN [id number]   [facility] 5NW-A Attending Jagruti Michel MD   [address] Day # 7 PCP Tylor Jimenez MD     SUBJECTIVE:Doing better     OBJECTIVE:  /78 mmHg FLUoxetine HCl (PROZAC) cap 20 mg, 20 mg, Oral, Daily  •  lisinopril (PRINIVIL,ZESTRIL) tab 10 mg, 10 mg, Oral, Daily  •  Pravastatin Sodium (PRAVACHOL) tab 20 mg, 20 mg, Oral, Nightly  •  melatonin tab TABS 3 mg, 3 mg, Oral, Nightly  •  Montelukast Sodium mask overnight  -bring in old study so that  can order new machine supplies tomorrow prior to d/c  5.  Sinusitis  -per ENT      Anderson Wilkins MD  1/29/2017  1:38 PM

## 2017-01-29 NOTE — PLAN OF CARE
Received patient a/ox3. Continues to c/o of cough. Spo2 on RA maintained 95-97% while sleeping. Patient and wife updated on plan of care and verbalize understanding. Vital signs stable. Will continue to monitor.     DISCHARGE PLANNING    • Discharge to home

## 2017-01-29 NOTE — PROGRESS NOTES
[facility]  Progress Note    Jaziel Gaming Patient Status:  Inpatient    1965 MRN [id number]   [facility] 5NW-A Attending Elvira Carrillo MD   [address] Day # 7 PCP Dario Marrero MD         SUBJECTIVE:  Subjective:  [address] • predniSONE  30 mg Oral Daily with breakfast   • sucralfate  1 g Oral TID AC and HS   • ipratropium-albuterol  3 mL Nebulization 6 times per day   • budesonide  0.5 mg Nebulization BID   • Fluticasone Furoate-Vilanterol  1 puff Inhalation Daily   • be

## 2017-01-30 VITALS
SYSTOLIC BLOOD PRESSURE: 122 MMHG | WEIGHT: 220.44 LBS | BODY MASS INDEX: 32.65 KG/M2 | TEMPERATURE: 99 F | HEART RATE: 80 BPM | RESPIRATION RATE: 20 BRPM | HEIGHT: 69.02 IN | DIASTOLIC BLOOD PRESSURE: 69 MMHG | OXYGEN SATURATION: 93 %

## 2017-01-30 PROCEDURE — 94640 AIRWAY INHALATION TREATMENT: CPT

## 2017-01-30 PROCEDURE — 94664 DEMO&/EVAL PT USE INHALER: CPT

## 2017-01-30 RX ORDER — ALBUTEROL SULFATE 2.5 MG/3ML
2.5 SOLUTION RESPIRATORY (INHALATION) EVERY 4 HOURS PRN
Qty: 540 ML | Refills: 0 | Status: SHIPPED | OUTPATIENT
Start: 2017-01-30 | End: 2017-09-26 | Stop reason: ALTCHOICE

## 2017-01-30 RX ORDER — PREDNISONE 20 MG/1
20 TABLET ORAL
Status: DISCONTINUED | OUTPATIENT
Start: 2017-01-31 | End: 2017-01-30

## 2017-01-30 RX ORDER — BUDESONIDE 0.5 MG/2ML
0.5 INHALANT ORAL 2 TIMES DAILY
Status: DISCONTINUED | OUTPATIENT
Start: 2017-01-30 | End: 2017-01-30

## 2017-01-30 RX ORDER — PREDNISONE 20 MG/1
20 TABLET ORAL
Qty: 2 TABLET | Refills: 0 | Status: SHIPPED | OUTPATIENT
Start: 2017-01-31 | End: 2017-02-02

## 2017-01-30 RX ORDER — IPRATROPIUM BROMIDE AND ALBUTEROL SULFATE 2.5; .5 MG/3ML; MG/3ML
3 SOLUTION RESPIRATORY (INHALATION) EVERY 4 HOURS PRN
Status: DISCONTINUED | OUTPATIENT
Start: 2017-01-30 | End: 2017-01-30

## 2017-01-30 NOTE — PROGRESS NOTES
NURSING DISCHARGE NOTE    Discharged Home via Wheelchair. Accompanied by Spouse and Support staff  Belongings Taken by patient/family. Patient discharged home per ENT, pulm and PCP. Discharge instructions given to patient.   Patient instructed to f

## 2017-01-30 NOTE — PLAN OF CARE
PAIN - ADULT    • Verbalizes/displays adequate comfort level or patient's stated pain goal Progressing        RESPIRATORY - ADULT    • Achieves optimal ventilation and oxygenation Progressing        Patient alert and appropriate. Denies chest pain.  No sign

## 2017-01-30 NOTE — PAYOR COMM NOTE
Attending Physician: No att. providers found    Review Type: CONTINUED STAY  Reviewer: Judson Campbell     Date: January 30, 2017 - 3:27 PM  Payor: [address] [facility]  Authorization Number: [id number]  Admit date: 1/22/2017  6:30 PM        [place] [facility] [address] [place] [address]

## 2017-01-30 NOTE — PROGRESS NOTES
Pulmonary Progress Note        NAME: Abril Monson - ROOM: [facility] - MRN: [id number] - Age: 49year old - : 1965    Assessment/Plan:  1.  Dyspnea/hypoxia/Cough -   -due to asthma exacerbation triggered by RSV   -O2 as needed, on room air today at 10 mg Oral Nightly   • multivitamin  1 tablet Oral Daily   • Pantoprazole Sodium  20 mg Oral QAM AC   • Amoxicillin-Pot Clavulanate  1 tablet Oral 2 times per day   • Acidophilus/Pectin  1 capsule Oral BID     Intake/Output Summary (Last 24 hours) at 01/30

## 2017-01-30 NOTE — PROGRESS NOTES
[facility]    Progress Note    Eliseo Lefort Patient Status:  Inpatient    1965 MRN [id number]   [facility] 5NW-A Attending Elroy Landrum MD   [facility] Day # 8 PCP Mardene Pallas, MD       SUBJECTIVE:  CONCERNED BECAUSE Ling Rosado MG/ML) 0.5% nebulizer solution 10 mg 10 mg Nebulization Q4H PRN   Loperamide HCl (IMODIUM) cap 4 mg 4 mg Oral QID PRN   ibuprofen (MOTRIN) tab 600 mg 600 mg Oral Q6H PRN   guaiFENesin-codeine (ROBITUSSIN AC) 100-10 MG/5ML syrup 10 mL 10 mL Oral Q4H PRN   a COULD BE DUE TO VOCAL CORD DYSFUNCTION; NEEDS OP S.T.  SINUS ISSUES- O.P F//U WITH DR. Tennille Zabala  ANXIETY-CPM          Radha De León  1/30/2017  12:12 PM

## 2017-01-30 NOTE — PROGRESS NOTES
S: patient feels nose is a little better. Saline helped with some of the crusting and congestion. No active bleeding but trying to blow his nose a lot. Ears are popping. Still feels he is having troubles with the CPAP.    O:    01/30/17  0500   BP:    P

## 2017-01-30 NOTE — CM/SW NOTE
Met with pt and wife in room. Explained need to have sleep study in order to get a CPAP covered by his insurance. Per old records- last sleep study was in March of 2009 which is out of date for insurance coverage.   Pt and wife were very anxious about elie

## 2017-01-31 NOTE — RESPIRATORY THERAPY NOTE
Set Mode :AFLEX                        Usage in Hours: 7:17                          90% Exp. Pressure(EPAP):7.0                          90% Insp. Pressure(IPAP):                          AHI :6.0

## 2017-02-01 NOTE — DISCHARGE SUMMARY
[facility]  Discharge Summary    Abril Monson Patient Status:  Inpatient    1965 MRN [id number]   [facility] 5NW-A Attending No att. providers found   Hosp Day # 8 PCP Suzan Tanner MD     Date of Admission: 2017    Simon Tab  Take 1 tablet (20 mg total) by mouth daily with breakfast., Normal, Disp-2 tablet, R-0    HYDROcodone-acetaminophen 5-325 MG Oral Tab  Take 1 tablet by mouth every 6 (six) hours as needed. , Print, Disp-30 tablet, R-0    Amoxicillin-Pot Clavulanate 876 MG Oral Tab  Take 10 mg by mouth daily. , Historical    Lovastatin 40 MG Oral Tab  Take 40 mg by mouth nightly., Historical    Loperamide HCl (IMODIUM) 2 MG Oral Cap  Take 2 mg by mouth 4 (four) times daily as needed for Diarrhea., Historical    ibuprofen (

## 2017-02-03 ENCOUNTER — OFFICE VISIT (OUTPATIENT)
Dept: SLEEP CENTER | Facility: HOSPITAL | Age: 52
End: 2017-02-03
Attending: INTERNAL MEDICINE
Payer: COMMERCIAL

## 2017-02-03 PROCEDURE — 95811 POLYSOM 6/>YRS CPAP 4/> PARM: CPT

## 2017-02-09 ENCOUNTER — HOSPITAL ENCOUNTER (OUTPATIENT)
Dept: SPEECH THERAPY | Facility: HOSPITAL | Age: 52
Setting detail: THERAPIES SERIES
Discharge: HOME | End: 2017-02-09
Attending: INTERNAL MEDICINE
Payer: COMMERCIAL

## 2017-02-09 DIAGNOSIS — R06.2 WHEEZING: Primary | ICD-10-CM

## 2017-02-09 DIAGNOSIS — J38.3 VOCAL CORD DYSFUNCTION: ICD-10-CM

## 2017-02-09 PROCEDURE — 92524 BEHAVRAL QUALIT ANALYS VOICE: CPT

## 2017-02-09 NOTE — PROGRESS NOTES
VOCAL CORD DYSFUNCTION EVALUATION  Referring Physician: Dr. Mylene Chavez MD  Diagnosis: Wheezing (R06.2)  Vocal cord dysfunction (J38.3)   Date of Service: 2/9/2017     PATIENT SUMMARY  Wild Lyn is a 49year old y/o male who presents to therapy t Oral Tab Take 1 tablet by mouth every 6 (six) hours as needed. Disp: 30 tablet Rfl: 0   guaiFENesin-codeine (VIRTUSSIN A/C) 100-10 MG/5ML Oral Solution Take 10 mL by mouth 4 (four) times daily as needed.  WATCH FOR DROWSINESS, NAUSEA AND CONSTIPATION Disp: mg by mouth every morning before breakfast. Disp:  Rfl:    [DISCONTINUED] VITAMIN D OR None Entered Disp:  Rfl:      ASSESSMENT  Dysphonia (R49.0);  Vocal cord dysfunction (J38.3)    During the evaluation patient presented with respiratory signs and symptom breathing strategies, methods for preventing/remediating a VCD/chronic cough attack, laryngeal relaxation strategies, and resonant voice techniques. . A home exercise program (HEP) will also be provided in order to improve carry-over and generalization of g actively participate in planning and for this course of care. Thank you for your referral. Please co-sign or sign and return this letter via fax as soon as possible to [phone]. If you have any questions, please contact me at Dept: [phone].

## 2017-02-11 NOTE — PROCEDURES
[address]       Accredited by the [facility] of [facility] (AASM)    PATIENT'S NAME:        Rosa Maria Herman  ATTENDING PHYSICIAN:   Karrie Edmonds M.D.   REFERRING PHYSICIAN:   Yunior Rodriguez M.D.  PA CONTINUITY, SLEEP ARCHITECTURE:  Total recording time was 408 minutes, total sleep time was 333 minutes, for a sleep efficiency of 82%, which is slightly decreased as compared to expected norms. Sleep onset latency was increased at 22 minutes.   Awake afte Edilberto Thacker M.D.  d: 02/10/2017 16:55:19  t: 02/10/2017 19:57:50  [facility] [id number]  AK/    cc: Yunior Rodriguez M.D.

## 2017-02-16 ENCOUNTER — HOSPITAL ENCOUNTER (OUTPATIENT)
Dept: SPEECH THERAPY | Facility: HOSPITAL | Age: 52
Setting detail: THERAPIES SERIES
Discharge: HOME | End: 2017-02-16
Attending: INTERNAL MEDICINE
Payer: COMMERCIAL

## 2017-02-16 PROCEDURE — 92507 TX SP LANG VOICE COMM INDIV: CPT

## 2017-02-17 NOTE — PROGRESS NOTES
Treatment #2 (PPO; PN due 4/9/17)   Treatment Time: 60 minutes  Precautions: none     Charges: 1 billed (33355)  Pain: 0/10      Diagnosis: VCD (J38.3); Dysphonia (R49.0)      Subjective: Patient arrived to session on time.   He stated he was tired after a resonant voice during oral reading task with 80% accuracy given min verbal/visual cues to reduce laryngeal focus and increase oral resonance for speech (3 months). Not targeted this date due to focus on other goals.     STG 5: Patient will demonstrate reso

## 2017-02-23 ENCOUNTER — HOSPITAL ENCOUNTER (OUTPATIENT)
Dept: SPEECH THERAPY | Facility: HOSPITAL | Age: 52
Setting detail: THERAPIES SERIES
Discharge: HOME | End: 2017-02-23
Attending: INTERNAL MEDICINE
Payer: COMMERCIAL

## 2017-02-23 PROCEDURE — 92507 TX SP LANG VOICE COMM INDIV: CPT

## 2017-02-24 NOTE — ADDENDUM NOTE
Encounter addended by:  Gary Borden, SLP on: 2/24/2017  8:48 AM<BR>     Documentation filed: Notes Section

## 2017-02-24 NOTE — PROGRESS NOTES
Treatment #3 (PPO; PN due 4/9/17)   Treatment Time: 60 minutes  Precautions: none     Charges: 1 billed (86859)  Pain: 0/10      Diagnosis: VCD (J38.3); Dysphonia (R49.0)      Subjective: Patient arrived to session on time.   He stated his voice was signifi focus on other goals. STG 5: Patient will demonstrate resonant voice during conversation in 4/5 turns given min verbal/visual cues to reduce laryngeal focus and increase oral resonance for speech (3 months).   Not targeted this date due to focus on other

## 2017-03-02 ENCOUNTER — HOSPITAL ENCOUNTER (OUTPATIENT)
Dept: SPEECH THERAPY | Facility: HOSPITAL | Age: 52
Setting detail: THERAPIES SERIES
Discharge: HOME | End: 2017-03-02
Attending: INTERNAL MEDICINE
Payer: COMMERCIAL

## 2017-03-02 PROCEDURE — 92507 TX SP LANG VOICE COMM INDIV: CPT

## 2017-03-03 NOTE — PROGRESS NOTES
Treatment #4 (PPO; PN due 4/9/17)   Treatment Time: 60 minutes  Precautions: none     Charges: 1 billed ([id number])  Pain: 0/10      Diagnosis: VCD (J38.3); Dysphonia (R49.0)      Subjective: Patient arrived to session on time.   As during previous session, he other goals. Assessment: Patient reports decreased instances of VCD/chronic coughing episodes.   He continues to present with mild-moderate dysphonia characterized by increased laryngeal focus during speech production.     Plan: Continue voice therapy ta

## 2017-03-09 ENCOUNTER — APPOINTMENT (OUTPATIENT)
Dept: SPEECH THERAPY | Facility: HOSPITAL | Age: 52
End: 2017-03-09
Attending: INTERNAL MEDICINE
Payer: COMMERCIAL

## 2017-03-16 ENCOUNTER — APPOINTMENT (OUTPATIENT)
Dept: SPEECH THERAPY | Facility: HOSPITAL | Age: 52
End: 2017-03-16
Attending: INTERNAL MEDICINE
Payer: COMMERCIAL

## 2017-03-23 ENCOUNTER — APPOINTMENT (OUTPATIENT)
Dept: SPEECH THERAPY | Facility: HOSPITAL | Age: 52
End: 2017-03-23
Attending: INTERNAL MEDICINE
Payer: COMMERCIAL

## 2017-03-30 ENCOUNTER — APPOINTMENT (OUTPATIENT)
Dept: SPEECH THERAPY | Facility: HOSPITAL | Age: 52
End: 2017-03-30
Attending: INTERNAL MEDICINE
Payer: COMMERCIAL

## 2017-05-12 ENCOUNTER — HOSPITAL ENCOUNTER (EMERGENCY)
Facility: HOSPITAL | Age: 52
Discharge: HOME | End: 2017-05-12
Attending: EMERGENCY MEDICINE
Payer: COMMERCIAL

## 2017-05-12 ENCOUNTER — APPOINTMENT (OUTPATIENT)
Dept: CT IMAGING | Facility: HOSPITAL | Age: 52
End: 2017-05-12
Attending: EMERGENCY MEDICINE
Payer: COMMERCIAL

## 2017-05-12 VITALS
TEMPERATURE: 98 F | DIASTOLIC BLOOD PRESSURE: 68 MMHG | OXYGEN SATURATION: 99 % | SYSTOLIC BLOOD PRESSURE: 99 MMHG | RESPIRATION RATE: 16 BRPM | HEART RATE: 66 BPM

## 2017-05-12 DIAGNOSIS — R10.9 ABDOMINAL PAIN OF UNKNOWN ETIOLOGY: ICD-10-CM

## 2017-05-12 DIAGNOSIS — T14.8XXA CONTUSION OF SOFT TISSUE: Primary | ICD-10-CM

## 2017-05-12 PROCEDURE — 80053 COMPREHEN METABOLIC PANEL: CPT | Performed by: EMERGENCY MEDICINE

## 2017-05-12 PROCEDURE — 99284 EMERGENCY DEPT VISIT MOD MDM: CPT

## 2017-05-12 PROCEDURE — 83690 ASSAY OF LIPASE: CPT | Performed by: EMERGENCY MEDICINE

## 2017-05-12 PROCEDURE — 81003 URINALYSIS AUTO W/O SCOPE: CPT | Performed by: EMERGENCY MEDICINE

## 2017-05-12 PROCEDURE — 36415 COLL VENOUS BLD VENIPUNCTURE: CPT

## 2017-05-12 PROCEDURE — 74177 CT ABD & PELVIS W/CONTRAST: CPT | Performed by: EMERGENCY MEDICINE

## 2017-05-12 PROCEDURE — 85025 COMPLETE CBC W/AUTO DIFF WBC: CPT | Performed by: EMERGENCY MEDICINE

## 2017-05-12 RX ORDER — PREDNISOLONE ACETATE 10 MG/ML
1 SUSPENSION/ DROPS OPHTHALMIC 4 TIMES DAILY
COMMUNITY
End: 2017-09-26 | Stop reason: ALTCHOICE

## 2017-05-12 NOTE — ED INITIAL ASSESSMENT (HPI)
Pt presents to the ED with complaints of pain from left flank radiating to the abdomen and down groin. Pt also c/o tingling to legs and states he \"feels swollen\". Pt denies any trauma.  Pt also states he awoke with a bruise to his left flank area and is u

## 2017-05-12 NOTE — ED PROVIDER NOTES
Patient Seen in: [facility] Emergency Department    History   Patient presents with:  Back Pain (musculoskeletal)  Abdomen/Flank Pain (GI/)    Stated Complaint: contusion to left flank- denies trauma or injury; numbness to all extremities f*    HP RENAL ENDOSCOPY         Medications :   prednisoLONE acetate 1 % Ophthalmic Suspension,  Place 1 drop into the right eye 4 (four) times daily.    albuterol sulfate (2.5 MG/3ML) 0.083% Inhalation Nebu Soln,  Take 3 mL (2.5 mg total) by nebulization every 4 ( Homer Ayers Mother    • Hypertension Mother    • High Cholesterol Mother    • Heart Surgery Mother      PCI   • Heart Disease Mother    • Psychiatric Mother      Depression   • Stroke Paternal Grandmother      cerebral aneurysm   • Neurological Disorder Patern throughout. Moderate tenderness on palpation of the right lower quadrant. No flank tenderness on palpation or percussion. No abdominal masses.   No peritoneal signs   Extremities: no edema, normal peripheral pulses   Neuro: Alert oriented and nonfocal. well-appearing with normal labs. Anticipate discharge home pending CT results. Patient to follow-up with primary care physician in 1-2 days. To return to the emergency room with worsening symptoms or any concerns.     Disposition and Plan     Clinical Im

## 2017-07-21 ENCOUNTER — HOSPITAL ENCOUNTER (OUTPATIENT)
Dept: GENERAL RADIOLOGY | Facility: HOSPITAL | Age: 52
Discharge: HOME | End: 2017-07-21
Attending: INTERNAL MEDICINE
Payer: COMMERCIAL

## 2017-07-21 ENCOUNTER — HOSPITAL ENCOUNTER (OUTPATIENT)
Dept: CT IMAGING | Facility: HOSPITAL | Age: 52
Discharge: HOME | End: 2017-07-21
Attending: INTERNAL MEDICINE
Payer: COMMERCIAL

## 2017-07-21 DIAGNOSIS — G93.89 RIGHT TEMPORAL LOBE MASS: ICD-10-CM

## 2017-07-21 DIAGNOSIS — M25.571 TOE JOINT PAIN, RIGHT: ICD-10-CM

## 2017-07-21 PROCEDURE — 70450 CT HEAD/BRAIN W/O DYE: CPT | Performed by: INTERNAL MEDICINE

## 2017-07-21 PROCEDURE — 73660 X-RAY EXAM OF TOE(S): CPT | Performed by: INTERNAL MEDICINE

## 2017-08-24 ENCOUNTER — CHARTING TRANS (OUTPATIENT)
Dept: OTHER | Age: 52
End: 2017-08-24

## 2017-09-07 ENCOUNTER — CHARTING TRANS (OUTPATIENT)
Dept: OTHER | Age: 52
End: 2017-09-07

## 2017-10-23 ENCOUNTER — OFFICE VISIT (OUTPATIENT)
Dept: HEMATOLOGY/ONCOLOGY | Facility: HOSPITAL | Age: 52
End: 2017-10-23
Attending: INTERNAL MEDICINE
Payer: COMMERCIAL

## 2017-10-23 VITALS
HEART RATE: 67 BPM | DIASTOLIC BLOOD PRESSURE: 69 MMHG | RESPIRATION RATE: 18 BRPM | BODY MASS INDEX: 33.33 KG/M2 | TEMPERATURE: 98 F | WEIGHT: 225 LBS | SYSTOLIC BLOOD PRESSURE: 122 MMHG | OXYGEN SATURATION: 100 % | HEIGHT: 69 IN

## 2017-10-23 DIAGNOSIS — D75.1 POLYCYTHEMIA: Primary | ICD-10-CM

## 2017-10-23 DIAGNOSIS — K59.1 FUNCTIONAL DIARRHEA: ICD-10-CM

## 2017-10-23 DIAGNOSIS — R61 UNEXPLAINED NIGHT SWEATS: ICD-10-CM

## 2017-10-23 DIAGNOSIS — E83.19 IRON OVERLOAD: ICD-10-CM

## 2017-10-23 PROCEDURE — 99245 OFF/OP CONSLTJ NEW/EST HI 55: CPT | Performed by: INTERNAL MEDICINE

## 2017-10-23 NOTE — PATIENT INSTRUCTIONS
For Dr. Eli Johnson nurse line, call [phone] with any questions or concerns,  Monday through Friday 8:00 to 4:30. After hours or weekends for urgent needs: [phone].   Central [phone]  Medical Records:   [phone]  Cancer Cent

## 2017-10-23 NOTE — PROGRESS NOTES
Patient here for initial MD visit. Referred by Dr. Andra Gaffney for abnormal labs. States told iron and Hgb high. Since inpatient in Feb. For RSV he has had several issues. He was put on oral and IV Prednisone and gained weight.   After discharged, tried to

## 2017-10-23 NOTE — PROGRESS NOTES
Hematology/Oncology Initial Consultation Note    Patient Name: Morris Wynn  Medical Record Number: [id number]    YOB: 1965   Date of Consultation: 10/23/2017   PCP: Dr. Rabia Wild  Other providers: Dr. Sandy Alegria, IV    Reason f Acute cholecystitis 2008   • ALLERGIC RHINITIS    • ANXIETY    • Asthma    • Bronchitis, chronic (HCC)    • Elevated liver function tests    • GERD    • HEMORRHOIDS    • HYPERTENSION    • Hypertrophy of breast    • Personal history of colonic polyps    • S daily. Disp:  Rfl:    Lovastatin 40 MG Oral Tab Take 40 mg by mouth nightly. Disp:  Rfl:    Loperamide HCl (IMODIUM) 2 MG Oral Cap Take 2 mg by mouth 4 (four) times daily as needed for Diarrhea.  Disp:  Rfl:    omeprazole (PRILOSEC) 20 MG Oral Capsule Delay • Heart Disease Mother    • Psychiatric Mother      Depression   • Other Vara Sermon Mother    • Stroke Paternal Grandmother      cerebral aneurysm   • Neurological Disorder Paternal Grandfather      Alzheimers.    • Psychiatric Sister      Depression   • Obe 05/12/2017   RDW 11.8 05/12/2017   .0 05/12/2017   .0 01/26/2017   .0 01/23/2017       Lab Results  Component Value Date   GLU 84 05/12/2017   BUN 16 05/12/2017   CREATSERUM 1.06 05/12/2017   CREATSERUM 1.12 01/26/2017   CREATSERUM 1.1 fluid collection, abscess, or skull fracture or destruction. Significant is uncertain. Brain otherwise normal.    Impression & Plan: *? Iron overload  -I do not have any labs indicating iron overload, though pt states this was done.   He did have a negat

## 2017-11-01 ENCOUNTER — HOSPITAL ENCOUNTER (EMERGENCY)
Facility: HOSPITAL | Age: 52
Discharge: HOME | End: 2017-11-01
Attending: EMERGENCY MEDICINE
Payer: COMMERCIAL

## 2017-11-01 ENCOUNTER — APPOINTMENT (OUTPATIENT)
Dept: CT IMAGING | Facility: HOSPITAL | Age: 52
End: 2017-11-01
Attending: EMERGENCY MEDICINE
Payer: COMMERCIAL

## 2017-11-01 VITALS
HEIGHT: 69 IN | SYSTOLIC BLOOD PRESSURE: 113 MMHG | DIASTOLIC BLOOD PRESSURE: 83 MMHG | BODY MASS INDEX: 33.33 KG/M2 | HEART RATE: 54 BPM | WEIGHT: 225 LBS | TEMPERATURE: 97 F | OXYGEN SATURATION: 97 % | RESPIRATION RATE: 10 BRPM

## 2017-11-01 DIAGNOSIS — R10.9 ABDOMINAL PAIN OF UNKNOWN ETIOLOGY: Primary | ICD-10-CM

## 2017-11-01 DIAGNOSIS — R11.2 NAUSEA AND VOMITING IN ADULT: ICD-10-CM

## 2017-11-01 DIAGNOSIS — K43.9 VENTRAL HERNIA WITHOUT OBSTRUCTION OR GANGRENE: ICD-10-CM

## 2017-11-01 PROCEDURE — 96374 THER/PROPH/DIAG INJ IV PUSH: CPT

## 2017-11-01 PROCEDURE — 83690 ASSAY OF LIPASE: CPT | Performed by: EMERGENCY MEDICINE

## 2017-11-01 PROCEDURE — 96375 TX/PRO/DX INJ NEW DRUG ADDON: CPT

## 2017-11-01 PROCEDURE — 81003 URINALYSIS AUTO W/O SCOPE: CPT | Performed by: EMERGENCY MEDICINE

## 2017-11-01 PROCEDURE — 99284 EMERGENCY DEPT VISIT MOD MDM: CPT

## 2017-11-01 PROCEDURE — 80053 COMPREHEN METABOLIC PANEL: CPT | Performed by: EMERGENCY MEDICINE

## 2017-11-01 PROCEDURE — 74176 CT ABD & PELVIS W/O CONTRAST: CPT | Performed by: EMERGENCY MEDICINE

## 2017-11-01 PROCEDURE — 85025 COMPLETE CBC W/AUTO DIFF WBC: CPT | Performed by: EMERGENCY MEDICINE

## 2017-11-01 PROCEDURE — 96361 HYDRATE IV INFUSION ADD-ON: CPT

## 2017-11-01 RX ORDER — NAPROXEN 500 MG/1
500 TABLET ORAL 2 TIMES DAILY PRN
Qty: 20 TABLET | Refills: 0 | Status: SHIPPED | OUTPATIENT
Start: 2017-11-01 | End: 2018-01-24

## 2017-11-01 RX ORDER — ONDANSETRON 2 MG/ML
4 INJECTION INTRAMUSCULAR; INTRAVENOUS ONCE
Status: COMPLETED | OUTPATIENT
Start: 2017-11-01 | End: 2017-11-01

## 2017-11-01 RX ORDER — ONDANSETRON 4 MG/1
4 TABLET, ORALLY DISINTEGRATING ORAL EVERY 4 HOURS PRN
Qty: 10 TABLET | Refills: 0 | Status: SHIPPED | OUTPATIENT
Start: 2017-11-01

## 2017-11-01 RX ORDER — DICYCLOMINE HCL 20 MG
20 TABLET ORAL 4 TIMES DAILY PRN
Qty: 30 TABLET | Refills: 0 | Status: SHIPPED | OUTPATIENT
Start: 2017-11-01 | End: 2017-12-01

## 2017-11-01 RX ORDER — KETOROLAC TROMETHAMINE 30 MG/ML
30 INJECTION, SOLUTION INTRAMUSCULAR; INTRAVENOUS ONCE
Status: COMPLETED | OUTPATIENT
Start: 2017-11-01 | End: 2017-11-01

## 2017-11-01 NOTE — ED PROVIDER NOTES
Patient Seen in: [facility] Emergency Department    History   Patient presents with:  Abdomen/Flank Pain (GI/)    Stated Complaint: abd pain    HPI    53-year-old-year-old male presents to the emergency department complaints of abdominal pain.   He SINUS  No date: OTHER SURGICAL HISTORY Right      Comment: elbow sx  No date: OTHER SURGICAL HISTORY      Comment: tubes for bile drainage  No date: REMOVAL GALLBLADDER  No date: RENAL ENDOSCOPY  No date: REPAIR ING HERNIA,5+Y/O,REDUCIBL  No date: REVISE U and negative except as noted above. PSFH elements reviewed from today and agreed except as otherwise stated in HPI.     Physical Exam   ED Triage Vitals [11/01/17 1152]  BP: 129/80  Pulse: 64  Resp: 18  Temp: (!) 96.9 °F (36.1 °C)  Temp src: Temporal  Sp Status                     ---------                               -----------         ------                     CBC W/ DIFFERENTIAL[[id number]]          Abnormal            Final result                 Please view results for these tests on the individual medications    naproxen 500 MG Oral Tab  Take 1 tablet (500 mg total) by mouth 2 (two) times daily as needed., Script printed, Disp-20 tablet, R-0    ondansetron 4 MG Oral Tablet Dispersible  Take 1 tablet (4 mg total) by mouth every 4 (four) hours as need

## 2017-11-01 NOTE — ED INITIAL ASSESSMENT (HPI)
48 y/o male to ED with c/o of abdominal pain, patient was dx with hernia a couple of days ago. Patient reports he was to follow up with gastroenterologist tomorrow, reports he couldn't tolerate pain.  Patient reports pain is mid abdomen with radiation to ri

## 2017-11-06 PROCEDURE — 82607 VITAMIN B-12: CPT | Performed by: OTHER

## 2017-11-06 PROCEDURE — 82746 ASSAY OF FOLIC ACID SERUM: CPT | Performed by: OTHER

## 2017-11-08 ENCOUNTER — TELEPHONE (OUTPATIENT)
Dept: HEMATOLOGY/ONCOLOGY | Facility: HOSPITAL | Age: 52
End: 2017-11-08

## 2017-11-08 DIAGNOSIS — R61 UNEXPLAINED NIGHT SWEATS: ICD-10-CM

## 2017-11-08 DIAGNOSIS — D75.1 POLYCYTHEMIA: Primary | ICD-10-CM

## 2017-11-08 NOTE — TELEPHONE ENCOUNTER
I called and left message on voicemail for patient. Lab workup for polycythemia was negative, particularly since it has resolved, thus more strongly favoring reactive etiology. His chromogranin A was elevated, though not markedly.   I suspect this is probab

## 2017-11-09 ENCOUNTER — TELEPHONE (OUTPATIENT)
Dept: HEMATOLOGY/ONCOLOGY | Facility: HOSPITAL | Age: 52
End: 2017-11-09

## 2017-11-09 NOTE — TELEPHONE ENCOUNTER
Patient asking for copy of lab results to be mailed to his home. Copy put in outgoing mail  Patient also asking to discuss lab results with Dr. Obinna Aldridge.   Especially the chromogranin test.  Message routed to MD.

## 2018-01-13 ENCOUNTER — APPOINTMENT (OUTPATIENT)
Dept: LAB | Facility: HOSPITAL | Age: 53
End: 2018-01-13
Attending: INTERNAL MEDICINE
Payer: COMMERCIAL

## 2018-01-13 PROCEDURE — 87045 FECES CULTURE AEROBIC BACT: CPT

## 2018-01-13 PROCEDURE — 87493 C DIFF AMPLIFIED PROBE: CPT

## 2018-01-13 PROCEDURE — 87077 CULTURE AEROBIC IDENTIFY: CPT

## 2018-01-13 PROCEDURE — 87046 STOOL CULTR AEROBIC BACT EA: CPT

## 2018-01-13 PROCEDURE — 87427 SHIGA-LIKE TOXIN AG IA: CPT

## 2018-01-15 ENCOUNTER — LAB ENCOUNTER (OUTPATIENT)
Dept: LAB | Facility: HOSPITAL | Age: 53
End: 2018-01-15
Attending: INTERNAL MEDICINE
Payer: COMMERCIAL

## 2018-01-15 DIAGNOSIS — R19.7 DIARRHEA: Primary | ICD-10-CM

## 2018-01-24 ENCOUNTER — OFFICE VISIT (OUTPATIENT)
Dept: HEMATOLOGY/ONCOLOGY | Facility: HOSPITAL | Age: 53
End: 2018-01-24
Attending: INTERNAL MEDICINE
Payer: COMMERCIAL

## 2018-01-24 VITALS
HEART RATE: 67 BPM | DIASTOLIC BLOOD PRESSURE: 67 MMHG | WEIGHT: 225.38 LBS | SYSTOLIC BLOOD PRESSURE: 105 MMHG | TEMPERATURE: 98 F | OXYGEN SATURATION: 100 % | BODY MASS INDEX: 33.38 KG/M2 | HEIGHT: 69.02 IN | RESPIRATION RATE: 18 BRPM

## 2018-01-24 DIAGNOSIS — D75.1 POLYCYTHEMIA: ICD-10-CM

## 2018-01-24 DIAGNOSIS — R61 UNEXPLAINED NIGHT SWEATS: Primary | ICD-10-CM

## 2018-01-24 PROBLEM — E83.19 IRON OVERLOAD: Status: RESOLVED | Noted: 2017-10-23 | Resolved: 2018-01-24

## 2018-01-24 LAB
BASOPHILS # BLD AUTO: 0.02 X10(3) UL (ref 0–0.1)
BASOPHILS NFR BLD AUTO: 0.3 %
EOSINOPHIL # BLD AUTO: 0.34 X10(3) UL (ref 0–0.3)
EOSINOPHIL NFR BLD AUTO: 4.7 %
ERYTHROCYTE [DISTWIDTH] IN BLOOD BY AUTOMATED COUNT: 12.2 % (ref 11.5–16)
HCT VFR BLD AUTO: 42.7 % (ref 37–53)
HGB BLD-MCNC: 15.1 G/DL (ref 13–17)
IMMATURE GRANULOCYTE COUNT: 0.02 X10(3) UL (ref 0–1)
IMMATURE GRANULOCYTE RATIO %: 0.3 %
LYMPHOCYTES # BLD AUTO: 2.09 X10(3) UL (ref 0.9–4)
LYMPHOCYTES NFR BLD AUTO: 28.9 %
MCH RBC QN AUTO: 31 PG (ref 27–33.2)
MCHC RBC AUTO-ENTMCNC: 35.4 G/DL (ref 31–37)
MCV RBC AUTO: 87.7 FL (ref 80–99)
MONOCYTES # BLD AUTO: 0.87 X10(3) UL (ref 0.1–0.6)
MONOCYTES NFR BLD AUTO: 12 %
NEUTROPHIL ABS PRELIM: 3.9 X10 (3) UL (ref 1.3–6.7)
NEUTROPHILS # BLD AUTO: 3.9 X10(3) UL (ref 1.3–6.7)
NEUTROPHILS NFR BLD AUTO: 53.8 %
PLATELET # BLD AUTO: 156 10(3)UL (ref 150–450)
RBC # BLD AUTO: 4.87 X10(6)UL (ref 4.3–5.7)
RED CELL DISTRIBUTION WIDTH-SD: 38.8 FL (ref 35.1–46.3)
WBC # BLD AUTO: 7.2 X10(3) UL (ref 4–13)

## 2018-01-24 PROCEDURE — 99214 OFFICE O/P EST MOD 30 MIN: CPT | Performed by: INTERNAL MEDICINE

## 2018-01-24 NOTE — PROGRESS NOTES
Hematology/Oncology Clinic Follow Up Visit    Patient Name: Ethan Sahni  Medical Record Number: [id number]    YOB: 1965    PCP: Dr. Celia Rothman  Other providers: Dr. Apryl Varghese, Dr. Lore Sousa    Reason for Consultation: 3-5x daily on most days. Taking imodium regularly. Reports frequent straining as well, though only has about 1 day per week without a BM. He has been taking omeprazole for GERD. It has been 8-10 years since he saw GI for a scope.     Has occasional low g Comment: hemorrhoids  3/7/12: HERNIA SURGERY      Comment: laparoscopic right inguinal hernia repair with               mesh by Dr. Arthur Ott @ [facility]  6/8/2006: NASAL SCOPY,OPEN Richard 19  No date: OTHER SURGICAL HISTORY Right      Comment: elbow sx  No da needed for Anxiety. Disp:  Rfl:    lisinopril (PRINIVIL,ZESTRIL) 10 MG Oral Tab Take 10 mg by mouth daily. Disp:  Rfl:    Lovastatin 40 MG Oral Tab Take 40 mg by mouth nightly.  Disp:  Rfl:    Loperamide HCl (IMODIUM) 2 MG Oral Cap Take 2 mg by mouth 4 (fou • Psychiatric Mother      Depression   • Other Ezio Look Mother    • Stroke Paternal Grandmother      cerebral aneurysm   • Neurological Disorder Paternal Grandfather      Alzheimers.    • Psychiatric Sister      Depression   • Obesity Sister    • Alcohol a 31.0 01/24/2018   MCHC 35.4 01/24/2018   RDW 12.2 01/24/2018   .0 01/24/2018   .0 11/01/2017   .0 10/23/2017       Lab Results  Component Value Date   GLU 94 11/01/2017   BUN 14 11/01/2017   CREATSERUM 1.11 11/01/2017   CREATSERUM 0.98 clips consistent with history of appendectomy. ABDOMINAL WALL:  Small fat containing umbilical hernia. Postsurgical changes of a right inguinal hernia repair, stable.     BONES:  Stable including spondylolisthesis, degenerative change and bilateral L5 sp

## 2018-01-24 NOTE — PATIENT INSTRUCTIONS
For Dr. Abelardo Dowling nurse line, call [phone] with any questions or concerns,  Monday through Friday 8:00 to 4:30. After hours or weekends for urgent needs: [phone].   Central [phone]  Medical Records:   [phone]  Cancer Cent

## 2018-01-27 LAB — CHROMOGRANIN A: 496 NG/ML

## 2018-01-31 ENCOUNTER — TELEPHONE (OUTPATIENT)
Dept: HEMATOLOGY/ONCOLOGY | Facility: HOSPITAL | Age: 53
End: 2018-01-31

## 2018-01-31 DIAGNOSIS — R23.2 ABNORMAL FLUSHING AND SWEATING: ICD-10-CM

## 2018-01-31 DIAGNOSIS — R61 UNEXPLAINED NIGHT SWEATS: Primary | ICD-10-CM

## 2018-01-31 DIAGNOSIS — R61 ABNORMAL FLUSHING AND SWEATING: ICD-10-CM

## 2018-02-01 ENCOUNTER — NURSE ONLY (OUTPATIENT)
Dept: HEMATOLOGY/ONCOLOGY | Facility: HOSPITAL | Age: 53
End: 2018-02-01
Attending: INTERNAL MEDICINE
Payer: COMMERCIAL

## 2018-02-01 DIAGNOSIS — R23.2 ABNORMAL FLUSHING AND SWEATING: ICD-10-CM

## 2018-02-01 DIAGNOSIS — R61 ABNORMAL FLUSHING AND SWEATING: ICD-10-CM

## 2018-02-01 DIAGNOSIS — R61 UNEXPLAINED NIGHT SWEATS: ICD-10-CM

## 2018-02-01 PROCEDURE — 36415 COLL VENOUS BLD VENIPUNCTURE: CPT

## 2018-02-01 PROCEDURE — 84260 ASSAY OF SEROTONIN: CPT

## 2018-02-01 NOTE — TELEPHONE ENCOUNTER
Chromogranin A further elevated. Though pt with less night sweats. Bowel sx seem more likely related to IBS and are stable. Chromogranin level is nonspecific, can be increased by PPI, other.   Will check urinary 5HIAA and serum serotonin, if normal would

## 2018-02-02 ENCOUNTER — TELEPHONE (OUTPATIENT)
Dept: HEMATOLOGY/ONCOLOGY | Facility: HOSPITAL | Age: 53
End: 2018-02-02

## 2018-02-02 NOTE — TELEPHONE ENCOUNTER
Pt calling regarding serotonin results- drawn yesterday afternoon. Spoke with chem and results for serum serotonin take 1- 5 days. Pt also requesting MD know that his prilosec was increased to 40 mg daily and he continues to take Zoloft.  Can this effect

## 2018-02-02 NOTE — TELEPHONE ENCOUNTER
Spoke with patient. Per MD- serotonin level will likely take 7 days to result. Updated med list- Zoloft added. He has been holding melatonin in order to start 24 hour urine collection. Aware MD is curious what these results will show.   Pt will be contacte

## 2018-02-04 LAB — SEROTONIN, SERUM: 12 NG/ML

## 2018-02-05 ENCOUNTER — APPOINTMENT (OUTPATIENT)
Dept: LAB | Facility: HOSPITAL | Age: 53
End: 2018-02-05
Attending: INTERNAL MEDICINE
Payer: COMMERCIAL

## 2018-02-05 DIAGNOSIS — R61 UNEXPLAINED NIGHT SWEATS: ICD-10-CM

## 2018-02-05 DIAGNOSIS — R61 ABNORMAL FLUSHING AND SWEATING: ICD-10-CM

## 2018-02-05 DIAGNOSIS — R23.2 ABNORMAL FLUSHING AND SWEATING: ICD-10-CM

## 2018-02-05 PROCEDURE — 83497 ASSAY OF 5-HIAA: CPT

## 2018-02-07 LAB
5-HIAA URINE - PER 24H: 2 MG/D
5-HIAA URINE - PER VOLUME: 0.8 MG/L
5-HIAA URINE - RATIO TO CRT: 2 MG/GCR
CREATININE, URINE - PER 24H: 1350 MG/D
CREATININE, URINE - PER VOLUME: 45 MG/DL
HOURS COLLECTED: 24 HR
TOTAL VOLUME: 3000 ML

## 2018-02-09 ENCOUNTER — TELEPHONE (OUTPATIENT)
Dept: HEMATOLOGY/ONCOLOGY | Facility: HOSPITAL | Age: 53
End: 2018-02-09

## 2018-02-09 NOTE — TELEPHONE ENCOUNTER
24hr urinary 5HIAA and serum serotonin both normal, speaks against an occult neuroendocrine tumor. I called pt and advised no further work up is necessary.   Chromogranin A can be nonspecific and based on his sx and prior work up I do not see a role in [place]

## 2018-02-27 PROCEDURE — 86200 CCP ANTIBODY: CPT | Performed by: INTERNAL MEDICINE

## 2018-02-27 PROCEDURE — 86618 LYME DISEASE ANTIBODY: CPT | Performed by: INTERNAL MEDICINE

## 2018-02-27 PROCEDURE — 84403 ASSAY OF TOTAL TESTOSTERONE: CPT | Performed by: INTERNAL MEDICINE

## 2018-02-27 PROCEDURE — 86060 ANTISTREPTOLYSIN O TITER: CPT | Performed by: INTERNAL MEDICINE

## 2018-03-28 PROBLEM — M05.79 RHEUMATOID ARTHRITIS INVOLVING MULTIPLE SITES WITH POSITIVE RHEUMATOID FACTOR (HCC): Status: ACTIVE | Noted: 2018-03-28

## 2018-04-02 PROBLEM — M87.052 AVASCULAR NECROSIS OF BONE OF HIP, LEFT (HCC): Status: ACTIVE | Noted: 2018-04-02

## 2018-04-11 ENCOUNTER — HOSPITAL ENCOUNTER (OUTPATIENT)
Dept: NUCLEAR MEDICINE | Facility: HOSPITAL | Age: 53
Discharge: HOME | End: 2018-04-11
Attending: INTERNAL MEDICINE
Payer: COMMERCIAL

## 2018-04-11 VITALS
DIASTOLIC BLOOD PRESSURE: 73 MMHG | RESPIRATION RATE: 20 BRPM | HEART RATE: 67 BPM | SYSTOLIC BLOOD PRESSURE: 128 MMHG | OXYGEN SATURATION: 96 %

## 2018-04-11 DIAGNOSIS — R79.9 ABNORMAL BLOOD CHEMISTRY: ICD-10-CM

## 2018-04-11 DIAGNOSIS — R19.7 DIARRHEA, UNSPECIFIED TYPE: ICD-10-CM

## 2018-04-11 DIAGNOSIS — R61 NIGHT SWEATS: ICD-10-CM

## 2018-04-11 PROCEDURE — 78815 PET IMAGE W/CT SKULL-THIGH: CPT | Performed by: INTERNAL MEDICINE

## 2018-04-11 NOTE — IMAGING NOTE
RN called to Pet scan with pt reporting not feeling well. Pt alert and oriented upon RN arrival. Reports chest pressure that is starting to resolve, feeling slightly light headed and slightly SOB, nauseated and throat is cold. VSS. NO rash or hives noted.

## 2018-04-30 PROBLEM — M70.62 TROCHANTERIC BURSITIS OF LEFT HIP: Status: ACTIVE | Noted: 2018-04-30

## 2018-05-09 PROBLEM — Z79.899 ENCOUNTER FOR DRUG THERAPY: Status: ACTIVE | Noted: 2018-05-09

## 2018-05-09 PROCEDURE — 85652 RBC SED RATE AUTOMATED: CPT | Performed by: INTERNAL MEDICINE

## 2018-09-16 ENCOUNTER — CHARTING TRANS (OUTPATIENT)
Dept: OTHER | Age: 53
End: 2018-09-16

## 2018-09-16 ASSESSMENT — PAIN SCALES - GENERAL: PAINLEVEL_OUTOF10: 0

## 2019-01-04 ENCOUNTER — LAB ENCOUNTER (OUTPATIENT)
Dept: LAB | Facility: HOSPITAL | Age: 54
End: 2019-01-04
Attending: INTERNAL MEDICINE
Payer: COMMERCIAL

## 2019-01-04 DIAGNOSIS — H53.139 SUDDEN VISUAL LOSS: Primary | ICD-10-CM

## 2019-01-04 LAB
ALBUMIN SERPL-MCNC: 4.1 G/DL (ref 3.1–4.5)
ALBUMIN/GLOB SERPL: 1.2 {RATIO} (ref 1–2)
ALP LIVER SERPL-CCNC: 74 U/L (ref 45–117)
ALT SERPL-CCNC: 56 U/L (ref 17–63)
ANA SCREEN: NEGATIVE
ANION GAP SERPL CALC-SCNC: 8 MMOL/L (ref 0–18)
AST SERPL-CCNC: 32 U/L (ref 15–41)
BASOPHILS # BLD AUTO: 0.02 X10(3) UL (ref 0–0.1)
BASOPHILS NFR BLD AUTO: 0.3 %
BILIRUB SERPL-MCNC: 0.5 MG/DL (ref 0.1–2)
BUN BLD-MCNC: 13 MG/DL (ref 8–20)
BUN/CREAT SERPL: 11.4 (ref 10–20)
CALCIUM BLD-MCNC: 9.1 MG/DL (ref 8.3–10.3)
CHLORIDE SERPL-SCNC: 104 MMOL/L (ref 101–111)
CO2 SERPL-SCNC: 26 MMOL/L (ref 22–32)
CREAT BLD-MCNC: 1.14 MG/DL (ref 0.7–1.3)
EOSINOPHIL # BLD AUTO: 0.17 X10(3) UL (ref 0–0.3)
EOSINOPHIL NFR BLD AUTO: 2.3 %
ERYTHROCYTE [DISTWIDTH] IN BLOOD BY AUTOMATED COUNT: 11.6 % (ref 11.5–16)
EST. AVERAGE GLUCOSE BLD GHB EST-MCNC: 103 MG/DL (ref 68–126)
GLOBULIN PLAS-MCNC: 3.4 G/DL (ref 2.8–4.4)
GLUCOSE BLD-MCNC: 81 MG/DL (ref 70–99)
HBA1C MFR BLD HPLC: 5.2 % (ref ?–5.7)
HCT VFR BLD AUTO: 47.3 % (ref 37–53)
HGB BLD-MCNC: 16.1 G/DL (ref 13–17)
IMMATURE GRANULOCYTE COUNT: 0.01 X10(3) UL (ref 0–1)
IMMATURE GRANULOCYTE RATIO %: 0.1 %
LYMPHOCYTES # BLD AUTO: 2.31 X10(3) UL (ref 0.9–4)
LYMPHOCYTES NFR BLD AUTO: 31.5 %
M PROTEIN MFR SERPL ELPH: 7.5 G/DL (ref 6.4–8.2)
MCH RBC QN AUTO: 31.5 PG (ref 27–33.2)
MCHC RBC AUTO-ENTMCNC: 34 G/DL (ref 31–37)
MCV RBC AUTO: 92.6 FL (ref 80–99)
MONOCYTES # BLD AUTO: 0.76 X10(3) UL (ref 0.1–1)
MONOCYTES NFR BLD AUTO: 10.4 %
NEUTROPHIL ABS PRELIM: 4.07 X10 (3) UL (ref 1.3–6.7)
NEUTROPHILS # BLD AUTO: 4.07 X10(3) UL (ref 1.3–6.7)
NEUTROPHILS NFR BLD AUTO: 55.4 %
OSMOLALITY SERPL CALC.SUM OF ELEC: 285 MOSM/KG (ref 275–295)
PLATELET # BLD AUTO: 187 10(3)UL (ref 150–450)
POTASSIUM SERPL-SCNC: 4.7 MMOL/L (ref 3.6–5.1)
RBC # BLD AUTO: 5.11 X10(6)UL (ref 4.3–5.7)
RED CELL DISTRIBUTION WIDTH-SD: 39.5 FL (ref 35.1–46.3)
RHEUMATOID FACT SERPL-ACNC: <10 IU/ML (ref ?–15)
SED RATE-ML: 6 MM/HR (ref 0–12)
SODIUM SERPL-SCNC: 138 MMOL/L (ref 136–144)
TSI SER-ACNC: 1.35 MIU/ML (ref 0.35–5.5)
WBC # BLD AUTO: 7.3 X10(3) UL (ref 4–13)

## 2019-01-04 PROCEDURE — 84443 ASSAY THYROID STIM HORMONE: CPT

## 2019-01-04 PROCEDURE — 83036 HEMOGLOBIN GLYCOSYLATED A1C: CPT

## 2019-01-04 PROCEDURE — 36415 COLL VENOUS BLD VENIPUNCTURE: CPT

## 2019-01-04 PROCEDURE — 80053 COMPREHEN METABOLIC PANEL: CPT

## 2019-01-04 PROCEDURE — 85652 RBC SED RATE AUTOMATED: CPT

## 2019-01-04 PROCEDURE — 85025 COMPLETE CBC W/AUTO DIFF WBC: CPT

## 2019-01-04 PROCEDURE — 86038 ANTINUCLEAR ANTIBODIES: CPT

## 2019-01-04 PROCEDURE — 86431 RHEUMATOID FACTOR QUANT: CPT

## 2019-01-07 ENCOUNTER — HOSPITAL ENCOUNTER (EMERGENCY)
Facility: HOSPITAL | Age: 54
Discharge: HOME | End: 2019-01-07
Attending: EMERGENCY MEDICINE
Payer: COMMERCIAL

## 2019-01-07 ENCOUNTER — APPOINTMENT (OUTPATIENT)
Dept: MRI IMAGING | Facility: HOSPITAL | Age: 54
End: 2019-01-07
Attending: EMERGENCY MEDICINE
Payer: COMMERCIAL

## 2019-01-07 VITALS
SYSTOLIC BLOOD PRESSURE: 129 MMHG | RESPIRATION RATE: 14 BRPM | DIASTOLIC BLOOD PRESSURE: 83 MMHG | WEIGHT: 230 LBS | BODY MASS INDEX: 34.07 KG/M2 | HEIGHT: 69 IN | HEART RATE: 76 BPM | TEMPERATURE: 97 F | OXYGEN SATURATION: 96 %

## 2019-01-07 DIAGNOSIS — H53.9 VISION CHANGES: ICD-10-CM

## 2019-01-07 DIAGNOSIS — R51.9 HEADACHE DISORDER: Primary | ICD-10-CM

## 2019-01-07 PROCEDURE — A9575 INJ GADOTERATE MEGLUMI 0.1ML: HCPCS

## 2019-01-07 PROCEDURE — 96374 THER/PROPH/DIAG INJ IV PUSH: CPT

## 2019-01-07 PROCEDURE — 99285 EMERGENCY DEPT VISIT HI MDM: CPT

## 2019-01-07 PROCEDURE — 70546 MR ANGIOGRAPH HEAD W/O&W/DYE: CPT | Performed by: EMERGENCY MEDICINE

## 2019-01-07 PROCEDURE — 70549 MR ANGIOGRAPH NECK W/O&W/DYE: CPT | Performed by: EMERGENCY MEDICINE

## 2019-01-07 PROCEDURE — 70553 MRI BRAIN STEM W/O & W/DYE: CPT | Performed by: EMERGENCY MEDICINE

## 2019-01-07 RX ORDER — KETOROLAC TROMETHAMINE 30 MG/ML
30 INJECTION, SOLUTION INTRAMUSCULAR; INTRAVENOUS ONCE
Status: COMPLETED | OUTPATIENT
Start: 2019-01-07 | End: 2019-01-07

## 2019-01-07 RX ORDER — KETOROLAC TROMETHAMINE 30 MG/ML
INJECTION, SOLUTION INTRAMUSCULAR; INTRAVENOUS
Status: DISCONTINUED
Start: 2019-01-07 | End: 2019-01-07

## 2019-01-07 NOTE — ED INITIAL ASSESSMENT (HPI)
Pt to ED for c/o headache and blurry vision for 5 weeks. Pt states blurry vision happens at least once a day. Pt saw PMD on Friday of last week and received orders for blood work and MRI.  Pt states he came to ER today because the blurry vision happened at

## 2019-01-24 PROBLEM — J45.30 MILD PERSISTENT ASTHMA WITHOUT COMPLICATION: Status: ACTIVE | Noted: 2019-01-24

## 2019-01-24 PROBLEM — J45.30 MILD PERSISTENT ASTHMA WITHOUT COMPLICATION (HCC): Status: ACTIVE | Noted: 2019-01-24

## 2019-03-05 VITALS
DIASTOLIC BLOOD PRESSURE: 68 MMHG | SYSTOLIC BLOOD PRESSURE: 116 MMHG | RESPIRATION RATE: 16 BRPM | OXYGEN SATURATION: 97 % | HEART RATE: 62 BPM | TEMPERATURE: 97.6 F

## 2019-08-28 PROCEDURE — 84403 ASSAY OF TOTAL TESTOSTERONE: CPT | Performed by: OTHER

## 2019-08-28 PROCEDURE — 84402 ASSAY OF FREE TESTOSTERONE: CPT | Performed by: OTHER

## 2020-01-20 ENCOUNTER — HOSPITAL ENCOUNTER (EMERGENCY)
Facility: HOSPITAL | Age: 55
Discharge: HOME | End: 2020-01-20
Attending: EMERGENCY MEDICINE
Payer: COMMERCIAL

## 2020-01-20 ENCOUNTER — APPOINTMENT (OUTPATIENT)
Dept: GENERAL RADIOLOGY | Facility: HOSPITAL | Age: 55
End: 2020-01-20
Attending: EMERGENCY MEDICINE
Payer: COMMERCIAL

## 2020-01-20 VITALS
OXYGEN SATURATION: 94 % | RESPIRATION RATE: 14 BRPM | TEMPERATURE: 99 F | BODY MASS INDEX: 34.07 KG/M2 | SYSTOLIC BLOOD PRESSURE: 114 MMHG | DIASTOLIC BLOOD PRESSURE: 77 MMHG | HEIGHT: 69 IN | WEIGHT: 230 LBS | HEART RATE: 96 BPM

## 2020-01-20 DIAGNOSIS — J98.01 BRONCHOSPASM: Primary | ICD-10-CM

## 2020-01-20 DIAGNOSIS — J06.9 UPPER RESPIRATORY TRACT INFECTION, UNSPECIFIED TYPE: ICD-10-CM

## 2020-01-20 LAB
ADENOVIRUS PCR:: NEGATIVE
ALBUMIN SERPL-MCNC: 3.6 G/DL (ref 3.4–5)
ALBUMIN/GLOB SERPL: 0.9 {RATIO} (ref 1–2)
ALP LIVER SERPL-CCNC: 68 U/L (ref 45–117)
ALT SERPL-CCNC: 63 U/L (ref 16–61)
ANION GAP SERPL CALC-SCNC: 5 MMOL/L (ref 0–18)
AST SERPL-CCNC: 50 U/L (ref 15–37)
B PERT DNA SPEC QL NAA+PROBE: NEGATIVE
BASOPHILS # BLD AUTO: 0.02 X10(3) UL (ref 0–0.2)
BASOPHILS NFR BLD AUTO: 0.3 %
BILIRUB SERPL-MCNC: 0.5 MG/DL (ref 0.1–2)
BUN BLD-MCNC: 18 MG/DL (ref 7–18)
BUN/CREAT SERPL: 16.2 (ref 10–20)
C PNEUM DNA SPEC QL NAA+PROBE: NEGATIVE
CALCIUM BLD-MCNC: 8.8 MG/DL (ref 8.5–10.1)
CHLORIDE SERPL-SCNC: 108 MMOL/L (ref 98–112)
CO2 SERPL-SCNC: 26 MMOL/L (ref 21–32)
CORONAVIRUS 229E PCR:: NEGATIVE
CORONAVIRUS HKU1 PCR:: NEGATIVE
CORONAVIRUS NL63 PCR:: NEGATIVE
CORONAVIRUS OC43 PCR:: NEGATIVE
CREAT BLD-MCNC: 1.11 MG/DL (ref 0.7–1.3)
DEPRECATED RDW RBC AUTO: 41.7 FL (ref 35.1–46.3)
EOSINOPHIL # BLD AUTO: 0.08 X10(3) UL (ref 0–0.7)
EOSINOPHIL NFR BLD AUTO: 1 %
ERYTHROCYTE [DISTWIDTH] IN BLOOD BY AUTOMATED COUNT: 12.3 % (ref 11–15)
FLUAV RNA SPEC QL NAA+PROBE: NEGATIVE
FLUBV RNA SPEC QL NAA+PROBE: NEGATIVE
GLOBULIN PLAS-MCNC: 3.9 G/DL (ref 2.8–4.4)
GLUCOSE BLD-MCNC: 76 MG/DL (ref 70–99)
HCT VFR BLD AUTO: 45.6 % (ref 39–53)
HGB BLD-MCNC: 15.8 G/DL (ref 13–17.5)
IMM GRANULOCYTES # BLD AUTO: 0.03 X10(3) UL (ref 0–1)
IMM GRANULOCYTES NFR BLD: 0.4 %
LYMPHOCYTES # BLD AUTO: 1.98 X10(3) UL (ref 1–4)
LYMPHOCYTES NFR BLD AUTO: 25.6 %
M PROTEIN MFR SERPL ELPH: 7.5 G/DL (ref 6.4–8.2)
MCH RBC QN AUTO: 32 PG (ref 26–34)
MCHC RBC AUTO-ENTMCNC: 34.6 G/DL (ref 31–37)
MCV RBC AUTO: 92.5 FL (ref 80–100)
METAPNEUMOVIRUS PCR:: POSITIVE
MONOCYTES # BLD AUTO: 1.07 X10(3) UL (ref 0.1–1)
MONOCYTES NFR BLD AUTO: 13.9 %
MYCOPLASMA PNEUMONIA PCR:: NEGATIVE
NEUTROPHILS # BLD AUTO: 4.54 X10 (3) UL (ref 1.5–7.7)
NEUTROPHILS # BLD AUTO: 4.54 X10(3) UL (ref 1.5–7.7)
NEUTROPHILS NFR BLD AUTO: 58.8 %
OSMOLALITY SERPL CALC.SUM OF ELEC: 289 MOSM/KG (ref 275–295)
PARAINFLUENZA 1 PCR:: NEGATIVE
PARAINFLUENZA 2 PCR:: NEGATIVE
PARAINFLUENZA 3 PCR:: NEGATIVE
PARAINFLUENZA 4 PCR:: NEGATIVE
PLATELET # BLD AUTO: 159 10(3)UL (ref 150–450)
POTASSIUM SERPL-SCNC: 4.4 MMOL/L (ref 3.5–5.1)
RBC # BLD AUTO: 4.93 X10(6)UL (ref 4.3–5.7)
RHINOVIRUS/ENTERO PCR:: NEGATIVE
RSV RNA SPEC QL NAA+PROBE: NEGATIVE
SODIUM SERPL-SCNC: 139 MMOL/L (ref 136–145)
WBC # BLD AUTO: 7.7 X10(3) UL (ref 4–11)

## 2020-01-20 PROCEDURE — 71045 X-RAY EXAM CHEST 1 VIEW: CPT | Performed by: EMERGENCY MEDICINE

## 2020-01-20 PROCEDURE — 94640 AIRWAY INHALATION TREATMENT: CPT

## 2020-01-20 PROCEDURE — 87633 RESP VIRUS 12-25 TARGETS: CPT | Performed by: EMERGENCY MEDICINE

## 2020-01-20 PROCEDURE — 99284 EMERGENCY DEPT VISIT MOD MDM: CPT

## 2020-01-20 PROCEDURE — 87486 CHLMYD PNEUM DNA AMP PROBE: CPT | Performed by: EMERGENCY MEDICINE

## 2020-01-20 PROCEDURE — 87798 DETECT AGENT NOS DNA AMP: CPT | Performed by: EMERGENCY MEDICINE

## 2020-01-20 PROCEDURE — 85025 COMPLETE CBC W/AUTO DIFF WBC: CPT | Performed by: EMERGENCY MEDICINE

## 2020-01-20 PROCEDURE — 80053 COMPREHEN METABOLIC PANEL: CPT | Performed by: EMERGENCY MEDICINE

## 2020-01-20 PROCEDURE — 87581 M.PNEUMON DNA AMP PROBE: CPT | Performed by: EMERGENCY MEDICINE

## 2020-01-20 PROCEDURE — 87999 UNLISTED MICROBIOLOGY PX: CPT

## 2020-01-20 PROCEDURE — 96374 THER/PROPH/DIAG INJ IV PUSH: CPT

## 2020-01-20 RX ORDER — METHYLPREDNISOLONE 4 MG/1
TABLET ORAL AS DIRECTED
COMMUNITY
End: 2020-08-06

## 2020-01-20 RX ORDER — AMOXICILLIN AND CLAVULANATE POTASSIUM 875; 125 MG/1; MG/1
1 TABLET, FILM COATED ORAL 2 TIMES DAILY
COMMUNITY
End: 2020-08-06

## 2020-01-20 RX ORDER — CODEINE PHOSPHATE AND GUAIFENESIN 10; 100 MG/5ML; MG/5ML
5 SOLUTION ORAL 4 TIMES DAILY PRN
Qty: 180 ML | Refills: 0 | Status: SHIPPED | OUTPATIENT
Start: 2020-01-20 | End: 2020-08-06

## 2020-01-20 RX ORDER — IPRATROPIUM BROMIDE AND ALBUTEROL SULFATE 2.5; .5 MG/3ML; MG/3ML
3 SOLUTION RESPIRATORY (INHALATION)
Status: DISCONTINUED | OUTPATIENT
Start: 2020-01-20 | End: 2020-01-20

## 2020-01-20 RX ORDER — IPRATROPIUM BROMIDE AND ALBUTEROL SULFATE 2.5; .5 MG/3ML; MG/3ML
3 SOLUTION RESPIRATORY (INHALATION) ONCE
Status: COMPLETED | OUTPATIENT
Start: 2020-01-20 | End: 2020-01-20

## 2020-01-20 RX ORDER — ACETAMINOPHEN 500 MG
1000 TABLET ORAL ONCE
Status: COMPLETED | OUTPATIENT
Start: 2020-01-20 | End: 2020-01-20

## 2020-01-20 RX ORDER — METHYLPREDNISOLONE SODIUM SUCCINATE 125 MG/2ML
125 INJECTION, POWDER, LYOPHILIZED, FOR SOLUTION INTRAMUSCULAR; INTRAVENOUS ONCE
Status: COMPLETED | OUTPATIENT
Start: 2020-01-20 | End: 2020-01-20

## 2020-01-20 NOTE — ED NOTES
Respiratory therapist at bedside at this time assessing patient. Will administer neb treatment and collect flu swab.

## 2020-01-20 NOTE — ED NOTES
Patient has been updated with results and plan for discharge. Educated on OTC medication options. Instructed to continue antibiotic and steroid dose pack from PMD. Continues to have non-productive strong cough. Will continue to use nebs at home.  States he

## 2020-01-20 NOTE — ED PROVIDER NOTES
Patient Seen in: [facility] Emergency Department      History   Patient presents with:  Dyspnea FRANCISCO SOB    Stated Complaint: fever cough and congestion    HPI    69-year-old male with history of asthma presents for evaluation of cough and shortness 3/7/12    laparoscopic right inguinal hernia repair with mesh by Dr. eNna Ambriz @ [facility]   • [address]  6/8/2006   • OTHER SURGICAL HISTORY Right     elbow sx   • OTHER SURGICAL HISTORY      tubes for bile drainage   • REMOVAL GALLBLADDER ALT 63 (*)     A/G Ratio 0.9 (*)     All other components within normal limits   RESPIRATORY PANEL FLU EXPANDED - Abnormal; Notable for the following components:    Metapneumovirus PCR: Positive (*)     All other components within normal limits   CBC W/ DI type    Disposition:  Discharge  1/20/2020  1:15 pm    Follow-up:  Jie Rutherford MD  [address]  [phone]    Schedule an appointment as soon as possible for a visit          Medications Prescribed:  Current D

## 2020-01-20 NOTE — ED INITIAL ASSESSMENT (HPI)
Patient reports he saw PMD on Thursday and was having cough/fever/sinus congestion. States he was prescribed augmentin. Has been alternating with ibuprofen/tylenol for fever, max 101. Using inhaler and neb every 4 hours at home.

## 2020-08-03 ENCOUNTER — APPOINTMENT (OUTPATIENT)
Dept: CV DIAGNOSTICS | Facility: HOSPITAL | Age: 55
End: 2020-08-03
Attending: EMERGENCY MEDICINE
Payer: COMMERCIAL

## 2020-08-03 ENCOUNTER — APPOINTMENT (OUTPATIENT)
Dept: CT IMAGING | Facility: HOSPITAL | Age: 55
End: 2020-08-03
Attending: EMERGENCY MEDICINE
Payer: COMMERCIAL

## 2020-08-03 ENCOUNTER — APPOINTMENT (OUTPATIENT)
Dept: GENERAL RADIOLOGY | Facility: HOSPITAL | Age: 55
End: 2020-08-03
Payer: COMMERCIAL

## 2020-08-03 ENCOUNTER — HOSPITAL ENCOUNTER (EMERGENCY)
Facility: HOSPITAL | Age: 55
Discharge: HOME | End: 2020-08-03
Attending: EMERGENCY MEDICINE
Payer: COMMERCIAL

## 2020-08-03 VITALS
DIASTOLIC BLOOD PRESSURE: 69 MMHG | RESPIRATION RATE: 11 BRPM | HEIGHT: 69 IN | BODY MASS INDEX: 33.33 KG/M2 | SYSTOLIC BLOOD PRESSURE: 112 MMHG | OXYGEN SATURATION: 96 % | TEMPERATURE: 97 F | WEIGHT: 225 LBS | HEART RATE: 73 BPM

## 2020-08-03 DIAGNOSIS — R07.9 CHEST PAIN OF UNCERTAIN ETIOLOGY: Primary | ICD-10-CM

## 2020-08-03 LAB
ALBUMIN SERPL-MCNC: 4.2 G/DL (ref 3.4–5)
ALBUMIN/GLOB SERPL: 1 {RATIO} (ref 1–2)
ALP LIVER SERPL-CCNC: 78 U/L (ref 45–117)
ALT SERPL-CCNC: 63 U/L (ref 16–61)
ANION GAP SERPL CALC-SCNC: 1 MMOL/L (ref 0–18)
APTT PPP: 30.3 SECONDS (ref 25.4–36.1)
AST SERPL-CCNC: 29 U/L (ref 15–37)
ATRIAL RATE: 80 BPM
BASOPHILS # BLD AUTO: 0.03 X10(3) UL (ref 0–0.2)
BASOPHILS NFR BLD AUTO: 0.3 %
BILIRUB SERPL-MCNC: 0.8 MG/DL (ref 0.1–2)
BUN BLD-MCNC: 16 MG/DL (ref 7–18)
BUN/CREAT SERPL: 15.2 (ref 10–20)
CALCIUM BLD-MCNC: 9.1 MG/DL (ref 8.5–10.1)
CHLORIDE SERPL-SCNC: 103 MMOL/L (ref 98–112)
CO2 SERPL-SCNC: 29 MMOL/L (ref 21–32)
CREAT BLD-MCNC: 1.05 MG/DL (ref 0.7–1.3)
D-DIMER: 0.35 UG/ML FEU (ref ?–0.53)
DEPRECATED RDW RBC AUTO: 42.1 FL (ref 35.1–46.3)
EOSINOPHIL # BLD AUTO: 0.2 X10(3) UL (ref 0–0.7)
EOSINOPHIL NFR BLD AUTO: 2 %
ERYTHROCYTE [DISTWIDTH] IN BLOOD BY AUTOMATED COUNT: 12.5 % (ref 11–15)
GLOBULIN PLAS-MCNC: 4.3 G/DL (ref 2.8–4.4)
GLUCOSE BLD-MCNC: 104 MG/DL (ref 70–99)
HCT VFR BLD AUTO: 51.6 % (ref 39–53)
HGB BLD-MCNC: 18.1 G/DL (ref 13–17.5)
IMM GRANULOCYTES # BLD AUTO: 0.03 X10(3) UL (ref 0–1)
IMM GRANULOCYTES NFR BLD: 0.3 %
INR BLD: 0.96 (ref 0.89–1.11)
LIPASE SERPL-CCNC: 169 U/L (ref 73–393)
LYMPHOCYTES # BLD AUTO: 2.68 X10(3) UL (ref 1–4)
LYMPHOCYTES NFR BLD AUTO: 26.3 %
M PROTEIN MFR SERPL ELPH: 8.5 G/DL (ref 6.4–8.2)
MCH RBC QN AUTO: 32.1 PG (ref 26–34)
MCHC RBC AUTO-ENTMCNC: 35.1 G/DL (ref 31–37)
MCV RBC AUTO: 91.5 FL (ref 80–100)
MONOCYTES # BLD AUTO: 1.25 X10(3) UL (ref 0.1–1)
MONOCYTES NFR BLD AUTO: 12.3 %
NEUTROPHILS # BLD AUTO: 6.01 X10 (3) UL (ref 1.5–7.7)
NEUTROPHILS # BLD AUTO: 6.01 X10(3) UL (ref 1.5–7.7)
NEUTROPHILS NFR BLD AUTO: 58.8 %
OSMOLALITY SERPL CALC.SUM OF ELEC: 277 MOSM/KG (ref 275–295)
P AXIS: 2 DEGREES
P-R INTERVAL: 140 MS
PLATELET # BLD AUTO: 199 10(3)UL (ref 150–450)
POTASSIUM SERPL-SCNC: 4.1 MMOL/L (ref 3.5–5.1)
PSA SERPL DL<=0.01 NG/ML-MCNC: 13.1 SECONDS (ref 12.4–14.6)
Q-T INTERVAL: 374 MS
QRS DURATION: 86 MS
QTC CALCULATION (BEZET): 431 MS
R AXIS: 30 DEGREES
RBC # BLD AUTO: 5.64 X10(6)UL (ref 4.3–5.7)
SARS-COV-2 RNA RESP QL NAA+PROBE: NOT DETECTED
SODIUM SERPL-SCNC: 133 MMOL/L (ref 136–145)
T AXIS: 31 DEGREES
TROPONIN I SERPL-MCNC: <0.045 NG/ML (ref ?–0.04)
VENTRICULAR RATE: 80 BPM
WBC # BLD AUTO: 10.2 X10(3) UL (ref 4–11)

## 2020-08-03 PROCEDURE — 93350 STRESS TTE ONLY: CPT | Performed by: EMERGENCY MEDICINE

## 2020-08-03 PROCEDURE — 93018 CV STRESS TEST I&R ONLY: CPT | Performed by: EMERGENCY MEDICINE

## 2020-08-03 PROCEDURE — 80053 COMPREHEN METABOLIC PANEL: CPT | Performed by: EMERGENCY MEDICINE

## 2020-08-03 PROCEDURE — 96361 HYDRATE IV INFUSION ADD-ON: CPT

## 2020-08-03 PROCEDURE — 93017 CV STRESS TEST TRACING ONLY: CPT | Performed by: EMERGENCY MEDICINE

## 2020-08-03 PROCEDURE — 99285 EMERGENCY DEPT VISIT HI MDM: CPT

## 2020-08-03 PROCEDURE — 84484 ASSAY OF TROPONIN QUANT: CPT | Performed by: EMERGENCY MEDICINE

## 2020-08-03 PROCEDURE — 83690 ASSAY OF LIPASE: CPT | Performed by: EMERGENCY MEDICINE

## 2020-08-03 PROCEDURE — 74177 CT ABD & PELVIS W/CONTRAST: CPT | Performed by: EMERGENCY MEDICINE

## 2020-08-03 PROCEDURE — 71045 X-RAY EXAM CHEST 1 VIEW: CPT | Performed by: EMERGENCY MEDICINE

## 2020-08-03 PROCEDURE — 85025 COMPLETE CBC W/AUTO DIFF WBC: CPT

## 2020-08-03 PROCEDURE — 71275 CT ANGIOGRAPHY CHEST: CPT | Performed by: EMERGENCY MEDICINE

## 2020-08-03 PROCEDURE — 85025 COMPLETE CBC W/AUTO DIFF WBC: CPT | Performed by: EMERGENCY MEDICINE

## 2020-08-03 PROCEDURE — 85610 PROTHROMBIN TIME: CPT | Performed by: EMERGENCY MEDICINE

## 2020-08-03 PROCEDURE — 85379 FIBRIN DEGRADATION QUANT: CPT | Performed by: EMERGENCY MEDICINE

## 2020-08-03 PROCEDURE — 96360 HYDRATION IV INFUSION INIT: CPT

## 2020-08-03 PROCEDURE — 85730 THROMBOPLASTIN TIME PARTIAL: CPT | Performed by: EMERGENCY MEDICINE

## 2020-08-03 PROCEDURE — 93010 ELECTROCARDIOGRAM REPORT: CPT

## 2020-08-03 PROCEDURE — 93005 ELECTROCARDIOGRAM TRACING: CPT

## 2020-08-03 RX ORDER — MAGNESIUM HYDROXIDE/ALUMINUM HYDROXICE/SIMETHICONE 120; 1200; 1200 MG/30ML; MG/30ML; MG/30ML
30 SUSPENSION ORAL ONCE
Status: COMPLETED | OUTPATIENT
Start: 2020-08-03 | End: 2020-08-03

## 2020-08-03 RX ORDER — ASPIRIN 81 MG/1
324 TABLET, CHEWABLE ORAL ONCE
Status: COMPLETED | OUTPATIENT
Start: 2020-08-03 | End: 2020-08-03

## 2020-08-03 RX ORDER — SODIUM CHLORIDE 9 MG/ML
1000 INJECTION, SOLUTION INTRAVENOUS ONCE
Status: COMPLETED | OUTPATIENT
Start: 2020-08-03 | End: 2020-08-03

## 2020-08-03 RX ORDER — LIDOCAINE HYDROCHLORIDE 20 MG/ML
10 SOLUTION OROPHARYNGEAL ONCE
Status: COMPLETED | OUTPATIENT
Start: 2020-08-03 | End: 2020-08-03

## 2020-08-03 NOTE — ED PROVIDER NOTES
Patient Seen in: [facility] Emergency Department      History   Patient presents with:  Chest Pain Angina  Abdomen/Flank Pain    Stated Complaint: cp and abd pain    HPI    Patient is a pleasant 41-year-old male, presenting for evaluation of chest p Date   • APPENDECTOMY     • CHOLECYSTECTOMY  3/3/2008    with bile leak   • COLONOSCOPY  1/15/2009    hemorrhoids   • EGD     • HERNIA SURGERY  3/7/12    laparoscopic right inguinal hernia repair with mesh by Dr. Celina Agarwal @ [facility]   • [address] [place] [address] nondistended, nontender. Bowel sounds present. SKIN: Skin is pink warm and dry. There are no rashes. EXTREMITIES: The patient moves all 4 extremities freely. No cyanosis, clubbing, or edema. NEUROLOGIC: The patient is awake, alert, and oriented x3.  C HISTORY: (As transcribed by Technologist)  The patient has midsternal chest pain. FINDINGS:  Lungs and pleural spaces are clear. Cardiomediastinal silhouette is within normal limits.   Visualized chest wall structures are normal.         CONCLUSION:  Th unchanged, measuring up to 2.8 cm. There is fluid density and appearance of dependent debris/material.  PANCREAS:  Uniform parenchyma. SPLEEN:  Not enlarged. Small accessory splenule. KIDNEYS:  Normal anatomic positions. No hydronephrosis.   There is new were obtained. Results of the patient's laboratory and radiology studies were reviewed and discussed with the patient. Stress echocardiogram was reviewed by Dr. Patric Benz and without evidence of ischemia.     Etiology of the patient's symptoms is not defi

## 2020-08-03 NOTE — ED INITIAL ASSESSMENT (HPI)
Pt states cp started on sat with a lot of belching and body aches, states \"it feels like a pulled muscle all over my body and stomach\"

## 2020-08-08 ENCOUNTER — HOSPITAL ENCOUNTER (INPATIENT)
Facility: HOSPITAL | Age: 55
LOS: 11 days | Discharge: HOME | DRG: 415 | End: 2020-08-20
Attending: EMERGENCY MEDICINE | Admitting: SURGERY
Payer: COMMERCIAL

## 2020-08-08 DIAGNOSIS — D72.829 LEUKOCYTOSIS, UNSPECIFIED TYPE: ICD-10-CM

## 2020-08-08 DIAGNOSIS — K81.0 ACUTE CHOLECYSTITIS: ICD-10-CM

## 2020-08-08 DIAGNOSIS — R10.9 ABDOMINAL PAIN, ACUTE: Primary | ICD-10-CM

## 2020-08-09 ENCOUNTER — APPOINTMENT (OUTPATIENT)
Dept: CT IMAGING | Facility: HOSPITAL | Age: 55
DRG: 415 | End: 2020-08-09
Attending: EMERGENCY MEDICINE
Payer: COMMERCIAL

## 2020-08-09 PROBLEM — K81.0 ACUTE CHOLECYSTITIS: Status: ACTIVE | Noted: 2020-08-09

## 2020-08-09 PROBLEM — D72.829 LEUKOCYTOSIS, UNSPECIFIED TYPE: Status: ACTIVE | Noted: 2020-08-09

## 2020-08-09 PROBLEM — R10.9 ABDOMINAL PAIN, ACUTE: Status: ACTIVE | Noted: 2020-08-09

## 2020-08-09 LAB
ALBUMIN SERPL-MCNC: 3.8 G/DL (ref 3.4–5)
ALBUMIN/GLOB SERPL: 0.9 {RATIO} (ref 1–2)
ALP LIVER SERPL-CCNC: 76 U/L (ref 45–117)
ALT SERPL-CCNC: 44 U/L (ref 16–61)
ANION GAP SERPL CALC-SCNC: 2 MMOL/L (ref 0–18)
AST SERPL-CCNC: 32 U/L (ref 15–37)
BASOPHILS # BLD AUTO: 0.04 X10(3) UL (ref 0–0.2)
BASOPHILS NFR BLD AUTO: 0.3 %
BILIRUB SERPL-MCNC: 0.6 MG/DL (ref 0.1–2)
BILIRUB UR QL STRIP.AUTO: NEGATIVE
BUN BLD-MCNC: 18 MG/DL (ref 7–18)
BUN/CREAT SERPL: 15.5 (ref 10–20)
CALCIUM BLD-MCNC: 9.6 MG/DL (ref 8.5–10.1)
CHLORIDE SERPL-SCNC: 100 MMOL/L (ref 98–112)
CLARITY UR REFRACT.AUTO: CLEAR
CO2 SERPL-SCNC: 31 MMOL/L (ref 21–32)
COLOR UR AUTO: YELLOW
CREAT BLD-MCNC: 1.16 MG/DL (ref 0.7–1.3)
DEPRECATED RDW RBC AUTO: 39.3 FL (ref 35.1–46.3)
EOSINOPHIL # BLD AUTO: 0.34 X10(3) UL (ref 0–0.7)
EOSINOPHIL NFR BLD AUTO: 2.5 %
ERYTHROCYTE [DISTWIDTH] IN BLOOD BY AUTOMATED COUNT: 11.9 % (ref 11–15)
GLOBULIN PLAS-MCNC: 4.2 G/DL (ref 2.8–4.4)
GLUCOSE BLD-MCNC: 93 MG/DL (ref 70–99)
GLUCOSE UR STRIP.AUTO-MCNC: NEGATIVE MG/DL
HCT VFR BLD AUTO: 46.2 % (ref 39–53)
HGB BLD-MCNC: 16.2 G/DL (ref 13–17.5)
IMM GRANULOCYTES # BLD AUTO: 0.04 X10(3) UL (ref 0–1)
IMM GRANULOCYTES NFR BLD: 0.3 %
KETONES UR STRIP.AUTO-MCNC: NEGATIVE MG/DL
LEUKOCYTE ESTERASE UR QL STRIP.AUTO: NEGATIVE
LIPASE SERPL-CCNC: 200 U/L (ref 73–393)
LYMPHOCYTES # BLD AUTO: 2.97 X10(3) UL (ref 1–4)
LYMPHOCYTES NFR BLD AUTO: 21.7 %
M PROTEIN MFR SERPL ELPH: 8 G/DL (ref 6.4–8.2)
MCH RBC QN AUTO: 31.8 PG (ref 26–34)
MCHC RBC AUTO-ENTMCNC: 35.1 G/DL (ref 31–37)
MCV RBC AUTO: 90.8 FL (ref 80–100)
MONOCYTES # BLD AUTO: 1.68 X10(3) UL (ref 0.1–1)
MONOCYTES NFR BLD AUTO: 12.3 %
NEUTROPHILS # BLD AUTO: 8.63 X10 (3) UL (ref 1.5–7.7)
NEUTROPHILS # BLD AUTO: 8.63 X10(3) UL (ref 1.5–7.7)
NEUTROPHILS NFR BLD AUTO: 62.9 %
NITRITE UR QL STRIP.AUTO: NEGATIVE
OSMOLALITY SERPL CALC.SUM OF ELEC: 278 MOSM/KG (ref 275–295)
PH UR STRIP.AUTO: 6 [PH] (ref 4.5–8)
PLATELET # BLD AUTO: 212 10(3)UL (ref 150–450)
POTASSIUM SERPL-SCNC: 4.3 MMOL/L (ref 3.5–5.1)
PROT UR STRIP.AUTO-MCNC: NEGATIVE MG/DL
RBC # BLD AUTO: 5.09 X10(6)UL (ref 4.3–5.7)
RBC UR QL AUTO: NEGATIVE
SARS-COV-2 RNA RESP QL NAA+PROBE: NOT DETECTED
SODIUM SERPL-SCNC: 133 MMOL/L (ref 136–145)
SP GR UR STRIP.AUTO: 1.01 (ref 1–1.03)
UROBILINOGEN UR STRIP.AUTO-MCNC: <2 MG/DL
WBC # BLD AUTO: 13.7 X10(3) UL (ref 4–11)

## 2020-08-09 PROCEDURE — 99223 1ST HOSP IP/OBS HIGH 75: CPT | Performed by: SURGERY

## 2020-08-09 PROCEDURE — 74177 CT ABD & PELVIS W/CONTRAST: CPT | Performed by: EMERGENCY MEDICINE

## 2020-08-09 RX ORDER — ALBUTEROL SULFATE 90 UG/1
1-2 AEROSOL, METERED RESPIRATORY (INHALATION) EVERY 6 HOURS PRN
Status: DISCONTINUED | OUTPATIENT
Start: 2020-08-09 | End: 2020-08-20

## 2020-08-09 RX ORDER — ONDANSETRON 2 MG/ML
4 INJECTION INTRAMUSCULAR; INTRAVENOUS EVERY 6 HOURS PRN
Status: DISCONTINUED | OUTPATIENT
Start: 2020-08-09 | End: 2020-08-11

## 2020-08-09 RX ORDER — DIVALPROEX SODIUM 500 MG/1
500 TABLET, EXTENDED RELEASE ORAL NIGHTLY
Status: DISCONTINUED | OUTPATIENT
Start: 2020-08-09 | End: 2020-08-20

## 2020-08-09 RX ORDER — SODIUM CHLORIDE 9 MG/ML
INJECTION, SOLUTION INTRAVENOUS CONTINUOUS
Status: DISCONTINUED | OUTPATIENT
Start: 2020-08-09 | End: 2020-08-20

## 2020-08-09 RX ORDER — HYDROMORPHONE HYDROCHLORIDE 1 MG/ML
0.5 INJECTION, SOLUTION INTRAMUSCULAR; INTRAVENOUS; SUBCUTANEOUS EVERY 2 HOUR PRN
Status: DISCONTINUED | OUTPATIENT
Start: 2020-08-09 | End: 2020-08-11

## 2020-08-09 RX ORDER — CLONAZEPAM 0.5 MG/1
0.5 TABLET ORAL 2 TIMES DAILY PRN
Status: DISCONTINUED | OUTPATIENT
Start: 2020-08-09 | End: 2020-08-20

## 2020-08-09 RX ORDER — MONTELUKAST SODIUM 10 MG/1
10 TABLET ORAL NIGHTLY
Status: DISCONTINUED | OUTPATIENT
Start: 2020-08-09 | End: 2020-08-20

## 2020-08-09 RX ORDER — ENOXAPARIN SODIUM 100 MG/ML
40 INJECTION SUBCUTANEOUS DAILY
Status: DISCONTINUED | OUTPATIENT
Start: 2020-08-09 | End: 2020-08-11

## 2020-08-09 RX ORDER — ONDANSETRON 4 MG/1
4 TABLET, ORALLY DISINTEGRATING ORAL EVERY 4 HOURS PRN
Status: DISCONTINUED | OUTPATIENT
Start: 2020-08-09 | End: 2020-08-12

## 2020-08-09 RX ORDER — MELATONIN
3 NIGHTLY
Status: DISCONTINUED | OUTPATIENT
Start: 2020-08-09 | End: 2020-08-20

## 2020-08-09 RX ORDER — LISINOPRIL 10 MG/1
10 TABLET ORAL DAILY
Status: DISCONTINUED | OUTPATIENT
Start: 2020-08-09 | End: 2020-08-20

## 2020-08-09 RX ORDER — HYDROMORPHONE HYDROCHLORIDE 1 MG/ML
0.5 INJECTION, SOLUTION INTRAMUSCULAR; INTRAVENOUS; SUBCUTANEOUS EVERY 30 MIN PRN
Status: DISCONTINUED | OUTPATIENT
Start: 2020-08-09 | End: 2020-08-09

## 2020-08-09 RX ORDER — PRAVASTATIN SODIUM 20 MG
20 TABLET ORAL NIGHTLY
Status: DISCONTINUED | OUTPATIENT
Start: 2020-08-09 | End: 2020-08-20

## 2020-08-09 RX ORDER — PANTOPRAZOLE SODIUM 40 MG/1
40 TABLET, DELAYED RELEASE ORAL
Status: DISCONTINUED | OUTPATIENT
Start: 2020-08-09 | End: 2020-08-12

## 2020-08-09 RX ORDER — SODIUM CHLORIDE 9 MG/ML
INJECTION, SOLUTION INTRAVENOUS CONTINUOUS
Status: DISCONTINUED | OUTPATIENT
Start: 2020-08-09 | End: 2020-08-09

## 2020-08-09 RX ORDER — ONDANSETRON 2 MG/ML
4 INJECTION INTRAMUSCULAR; INTRAVENOUS EVERY 4 HOURS PRN
Status: DISCONTINUED | OUTPATIENT
Start: 2020-08-09 | End: 2020-08-09

## 2020-08-09 RX ORDER — ONDANSETRON 2 MG/ML
4 INJECTION INTRAMUSCULAR; INTRAVENOUS ONCE
Status: COMPLETED | OUTPATIENT
Start: 2020-08-09 | End: 2020-08-09

## 2020-08-09 RX ORDER — PRAMIPEXOLE DIHYDROCHLORIDE 0.12 MG/1
0.12 TABLET ORAL 3 TIMES DAILY
Status: DISCONTINUED | OUTPATIENT
Start: 2020-08-09 | End: 2020-08-20

## 2020-08-09 RX ORDER — ACETAMINOPHEN 10 MG/ML
1000 INJECTION, SOLUTION INTRAVENOUS ONCE
Status: DISCONTINUED | OUTPATIENT
Start: 2020-08-09 | End: 2020-08-10

## 2020-08-09 RX ORDER — ACETAMINOPHEN 10 MG/ML
1000 INJECTION, SOLUTION INTRAVENOUS EVERY 6 HOURS PRN
Status: DISCONTINUED | OUTPATIENT
Start: 2020-08-09 | End: 2020-08-10

## 2020-08-09 RX ORDER — HYDROMORPHONE HYDROCHLORIDE 1 MG/ML
1 INJECTION, SOLUTION INTRAMUSCULAR; INTRAVENOUS; SUBCUTANEOUS EVERY 2 HOUR PRN
Status: DISCONTINUED | OUTPATIENT
Start: 2020-08-09 | End: 2020-08-11

## 2020-08-09 NOTE — ED PROVIDER NOTES
Patient Seen in: [facility] Emergency Department      History   Patient presents with:  Back Pain    Stated Complaint: back pain     HPI    52-year-old male presents emergency room for evaluation of right-sided back pain.   Patient states that last w History of depression    • HYPERTENSION    • Hypertrophy of breast    • Indigestion    • Irregular bowel habits    • Mouth sores    • Nausea    • Night sweats    • Osteoarthritis    • Pain in joints    • Pain with bowel movements    • Personal history of a 08/09/20 0312 Oral   SpO2 08/08/20 2252 96 %   O2 Device 08/08/20 2252 None (Room air)       Current:/78 (BP Location: Left arm)   Pulse 82   Temp 98.1 °F (36.7 °C) (Oral)   Resp 17   Ht 175.3 cm (5' 9\")   Wt 101.5 kg   SpO2 96%   BMI 33.04 kg/m² Abnormal; Notable for the following components:    Glucose 115 (*)     BUN/CREA Ratio 8.6 (*)     AST 74 (*)     ALT 94 (*)     Albumin 2.7 (*)     A/G Ratio 0.7 (*)     All other components within normal limits   CBC W/ DIFFERENTIAL - Abnormal; Notable fo ------                     CBC W/ DIFFERENTIAL[[id number]]          Abnormal            Final result                 Please view results for these tests on the individual orders. CBC WITH DIFFERENTIAL WITH PLATELET    Narrative:      The following orders w measures 3.0 x 1.9 cm likely from a subtotal cholecystectomy. This gallbladder stump contains small gallstones at with moderate surrounding inflammatory stranding. No evidence of perforation or abscess.   CBD is nondilated.    -No aortic aneurysm.  -No ev
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
## 2020-08-09 NOTE — PLAN OF CARE
PT RESTING COMFORTABLE IN BED, VSS. LOW FEVER, IV TYLENOL GIVEN. RN MONITORING CLOSELY. PT PRESENTED TO ER WITH N/V/ABD PAIN. PT REPORTS PAIN MANAGED WITH IV PAIN MEDICATIONS; SEE MAR. PT CURRENTLY DENIES NAUSEA.  PT GETS UP WITH STEADY GAIT, USING BATHROOM discharge resources and transportation as appropriate  - Identify discharge learning needs (meds, wound care, etc)  - Arrange for interpreters to assist at discharge as needed  - Consider post-discharge preferences of patient/family/discharge partner  - Co

## 2020-08-09 NOTE — CONSULTS
[facility]  Report of Consultation    Gretel Hands Patient Status:  Inpatient    1965 MRN [id number]   [facility] 3NW-A Attending Blas Lock MD   Hosp Day # 0 PCP Martha Tsang MD     Requesting Physician:  Dr. Valdovinos Res Diagnosis Date   • Abdominal distention    • Abdominal hernia    • Abdominal pain    • Acute cholecystitis 2008   • ALLERGIC RHINITIS    • ANXIETY    • Anxiety    • Arthritis    • Asthma    • Back pain    • Back problem    • Belching    • Bloating    • B Daughter         eye muscle surgery   • Ear Problems Daughter         hearing loss   • Cancer Father         Leukemia   • Hypertension Father    • Breast Cancer Mother    • Arthritis Mother         TKA's   • Hypertension Mother    • High Cholesterol Mother 180 mg by mouth daily. Montelukast Sodium (SINGULAIR) 10 MG Oral Tab, Take 10 mg by mouth nightly. Cholecalciferol (VITAMIN D3) 2000 UNITS Oral Tab, Take 1 tablet by mouth daily. melatonin 3 MG Oral Tab, Take 3 mg by mouth nightly.   ClonazePAM [facility]. Sodium (SINGULAIR) tab 10 mg, 10 mg, Oral, Nightly  •  Pantoprazole Sodium (PROTONIX) EC tab 40 mg, 40 mg, Oral, QAM AC  •  ondansetron (ZOFRAN-ODT) disintegrating tab 4 mg, 4 mg, Oral, Q4H PRN  •  Pramipexole Dihydrochloride (MIRAPEX) tab 0.125 mg, 0.125 deep palpation in the epigastrium and the right upper quadrant. There is no involuntary guarding noted. There is no percussion tenderness. Neurological: He is alert and oriented to person, place, and time. Skin: Skin is warm and dry.    Psychiatric: H arthritis involving multiple sites with positive rheumatoid factor (HCC)     Avascular necrosis of bone of hip, left (HCC)     Trochanteric bursitis of left hip     Encounter for drug therapy     Mild persistent asthma without complication     Abdominal pa quite difficult. Postoperatively, the patient did develop a bile leak. The patient was subsequently admitted to [facility] for ERCP. Juany states that after ERCP he developed severe pancreatitis and \"\" sepsis.   The patient remained in the hospital for 3 t

## 2020-08-09 NOTE — ED INITIAL ASSESSMENT (HPI)
Pt presents to ed ambulatory with steady gait c/o right lower back pain that radiates to the mid back. Pt rates pain at a 7/10, that has worsened over the past week.  Pt states he was recently evaluated in this ed for chest pain and states \"they told me on

## 2020-08-09 NOTE — H&P
H&P to follow, Dr. Remberto Calderon in room, await Dr. Nita Romberg final note and plan. ID note appreciated.

## 2020-08-09 NOTE — PROGRESS NOTES
[facility] Pharmacy Note: Antimicrobial Weight Based Dose Adjustment for: piperacillin/tazobactam (Pritesh Lucio)    Sorin Paz is a 49year old patient who has been prescribed piperacillin/tazobactam (ZOSYN) 3.375 g every 8 hours.     Estimated Creatinine Clearance:

## 2020-08-09 NOTE — CONSULTS
INFECTIOUS DISEASE CONSULT NOTE    Wild Eboni Patient Status:  Inpatient    1965 MRN [id number]   [facility] 3NW-A Attending Barbara Lee MD   Hosp Day # 0 PCP Keyla Hernandez We CHOLECYSTECTOMY  3/3/2008    with bile leak   • COLONOSCOPY  1/15/2009    hemorrhoids   • EGD     • HERNIA SURGERY  3/7/12    laparoscopic right inguinal hernia repair with mesh by Dr. Hernando Fine @ [address] [place] [address]  6/8/2006   • OTHER WOLF Levaquin                SWELLING    Medications:    Current Facility-Administered Medications:   •  acetaminophen (OFIRMEV) infusion 1,000 mg, 1,000 mg, Intravenous, Once  •  HYDROmorphone HCl (DILAUDID) 1 MG/ML injection 0.5 mg, 0.5 mg, Intravenous, Q2H 44 08/08/2020     08/08/2020       Microbiologic Data:   (this admission)    Reviewed in EMR    Imaging:  CT Scan    Imaging results reviewed     Impression:  Patient Active Problem List:     Pure hypercholesterolemia     Essential hypertension, philip

## 2020-08-10 LAB
ALBUMIN SERPL-MCNC: 3.1 G/DL (ref 3.4–5)
ALBUMIN/GLOB SERPL: 0.8 {RATIO} (ref 1–2)
ALP LIVER SERPL-CCNC: 66 U/L (ref 45–117)
ALT SERPL-CCNC: 37 U/L (ref 16–61)
ANION GAP SERPL CALC-SCNC: 3 MMOL/L (ref 0–18)
AST SERPL-CCNC: 22 U/L (ref 15–37)
BASOPHILS # BLD AUTO: 0.04 X10(3) UL (ref 0–0.2)
BASOPHILS NFR BLD AUTO: 0.4 %
BILIRUB SERPL-MCNC: 0.8 MG/DL (ref 0.1–2)
BUN BLD-MCNC: 12 MG/DL (ref 7–18)
BUN/CREAT SERPL: 10.4 (ref 10–20)
CALCIUM BLD-MCNC: 8.7 MG/DL (ref 8.5–10.1)
CHLORIDE SERPL-SCNC: 100 MMOL/L (ref 98–112)
CO2 SERPL-SCNC: 28 MMOL/L (ref 21–32)
CREAT BLD-MCNC: 1.15 MG/DL (ref 0.7–1.3)
DEPRECATED RDW RBC AUTO: 40.3 FL (ref 35.1–46.3)
EOSINOPHIL # BLD AUTO: 0.34 X10(3) UL (ref 0–0.7)
EOSINOPHIL NFR BLD AUTO: 3.1 %
ERYTHROCYTE [DISTWIDTH] IN BLOOD BY AUTOMATED COUNT: 11.8 % (ref 11–15)
GLOBULIN PLAS-MCNC: 3.9 G/DL (ref 2.8–4.4)
GLUCOSE BLD-MCNC: 105 MG/DL (ref 70–99)
HCT VFR BLD AUTO: 42.5 % (ref 39–53)
HGB BLD-MCNC: 14.6 G/DL (ref 13–17.5)
IMM GRANULOCYTES # BLD AUTO: 0.04 X10(3) UL (ref 0–1)
IMM GRANULOCYTES NFR BLD: 0.4 %
LYMPHOCYTES # BLD AUTO: 2.15 X10(3) UL (ref 1–4)
LYMPHOCYTES NFR BLD AUTO: 19.3 %
M PROTEIN MFR SERPL ELPH: 7 G/DL (ref 6.4–8.2)
MCH RBC QN AUTO: 31.9 PG (ref 26–34)
MCHC RBC AUTO-ENTMCNC: 34.4 G/DL (ref 31–37)
MCV RBC AUTO: 93 FL (ref 80–100)
MONOCYTES # BLD AUTO: 1.53 X10(3) UL (ref 0.1–1)
MONOCYTES NFR BLD AUTO: 13.7 %
NEUTROPHILS # BLD AUTO: 7.03 X10 (3) UL (ref 1.5–7.7)
NEUTROPHILS # BLD AUTO: 7.03 X10(3) UL (ref 1.5–7.7)
NEUTROPHILS NFR BLD AUTO: 63.1 %
OSMOLALITY SERPL CALC.SUM OF ELEC: 272 MOSM/KG (ref 275–295)
PLATELET # BLD AUTO: 175 10(3)UL (ref 150–450)
POTASSIUM SERPL-SCNC: 4.5 MMOL/L (ref 3.5–5.1)
RBC # BLD AUTO: 4.57 X10(6)UL (ref 4.3–5.7)
SODIUM SERPL-SCNC: 131 MMOL/L (ref 136–145)
WBC # BLD AUTO: 11.1 X10(3) UL (ref 4–11)

## 2020-08-10 RX ORDER — ACETAMINOPHEN 325 MG/1
650 TABLET ORAL EVERY 4 HOURS PRN
Status: DISCONTINUED | OUTPATIENT
Start: 2020-08-10 | End: 2020-08-11

## 2020-08-10 RX ORDER — CALCIUM CARBONATE 200(500)MG
1000 TABLET,CHEWABLE ORAL EVERY 4 HOURS PRN
Status: DISCONTINUED | OUTPATIENT
Start: 2020-08-10 | End: 2020-08-20

## 2020-08-10 RX ORDER — MORPHINE SULFATE 4 MG/ML
2 INJECTION, SOLUTION INTRAMUSCULAR; INTRAVENOUS EVERY 2 HOUR PRN
Status: DISCONTINUED | OUTPATIENT
Start: 2020-08-10 | End: 2020-08-11

## 2020-08-10 RX ORDER — METOCLOPRAMIDE HYDROCHLORIDE 5 MG/ML
10 INJECTION INTRAMUSCULAR; INTRAVENOUS EVERY 8 HOURS PRN
Status: DISCONTINUED | OUTPATIENT
Start: 2020-08-10 | End: 2020-08-12

## 2020-08-10 RX ORDER — ACETAMINOPHEN 325 MG/1
325 TABLET ORAL EVERY 4 HOURS PRN
Status: DISCONTINUED | OUTPATIENT
Start: 2020-08-10 | End: 2020-08-11

## 2020-08-10 RX ORDER — MORPHINE SULFATE 4 MG/ML
4 INJECTION, SOLUTION INTRAMUSCULAR; INTRAVENOUS EVERY 2 HOUR PRN
Status: DISCONTINUED | OUTPATIENT
Start: 2020-08-10 | End: 2020-08-11

## 2020-08-10 NOTE — PROGRESS NOTES
08/10/20 1700   Clinical Encounter Type   Visited With Patient   Routine Visit Introduction   Patient's Supportive Strategies/Resources Patient was nervous about consideration of surgery.  Patient is expressive and aware, has strong william support and fam

## 2020-08-10 NOTE — PROGRESS NOTES
Pt is Ax4, vss, afebrile, /Tele-NSR, NATALYA-CPAP, tolerating clear liquids, c/o some c/p possibly r/t heartburn, scheduled Pantoprazole given, voids, up at fidel, IVF & IV Zosyn infusing.  Pt's pain is not well managed w/ Dilaudid and is requesting Morphine i

## 2020-08-10 NOTE — PROGRESS NOTES
[facility]  Progress Note    Kathlynn Fraction Patient Status:  Inpatient    1965 MRN [id number]   [facility] 3NW-A Attending Sae Perez MD   Hosp Day # 1 PCP Kely Smiley MD         SUBJECTIVE:  Subjective:  Shantal Blanchard 4.1 4.3 4.5    100 100   CO2 29.0 31.0 28.0   BUN 16 18 12   CREATSERUM 1.05 1.16 1.15   CA 9.1 9.6 8.7   * 93 105*       Recent Labs   Lab 08/03/20  1057 08/08/20  2349 08/10/20  0639   ALT 63* 44 37   AST 29 32 22   ALB 4.2 3.8 3.1*       No techniques were used. Dose information is transmitted to the ACR [facility] [employer] [facility] Radiology) NRDR ([address]) which includes the Dose Index Registry.   PATIENT STATED HISTORY:(As transcribed by Technologist)  Chest pain with lower 3. There is minimal new stranding around the right kidney compared to the prior study from 5/12/2017. In the acute setting consider inflammation/infection. Correlation for pyelonephritis recommended.  4. There is a small focal fluid collection at the gall KIDNEYS:  No mass, obstruction, or calcification. ADRENALS:  No mass or enlargement. AORTA/VASCULAR:  No aneurysm or dissection. RETROPERITONEUM:  No mass or adenopathy. BOWEL/MESENTERY:  No visible mass, obstruction, or bowel wall thickening.   The lynette sinusitis     NATALYA on CPAP     Vitamin D deficiency     Inguinal hernia, right     Status post right inguinal hernia repair, follow-up exam     Weakness     Right-sided face pain     Asthma exacerbation     Respiratory syncytial virus (RSV)     Essential hy anatomic reasons.  The patient received Definity contrast for better post-exercise imaging.  Within the limits of the image quality, no obvious exercise-induced wall motion abnormality was detected.    No current cardiac symptoms or any underlying known co

## 2020-08-10 NOTE — PAYOR COMM NOTE
--------------  ADMISSION REVIEW     Payor: uT Agarwal  #:  [id number]  Authorization Number: [id number]    Admit date: 8/9/20  Admit time: [address]       Admitting Physician: Yassine Sharif MD  Attending Physician:  Yassine Sharif MD Past Medical History:   Diagnosis Date   • Abdominal distention    • Abdominal hernia    • Abdominal pain    • Acute cholecystitis 2008   • ALLERGIC RHINITIS    • Arthritis    • Asthma    • Back pain    • Belching    • Bloating    • Bronchitis, chronic (HC Pulse 99   Resp 18   Temp 98.1 °F (36.7 °C)   Temp src    SpO2 96 %   O2 Device None (Room air)     Current:BP (!) 122/101   Pulse 91   Temp 98.1 °F (36.7 °C)   Resp 16   Ht 175.3 cm (5' 9\")   Wt 105.2 kg   SpO2 97%   BMI 34.26 kg/m²      Physical Exam  G -Stump cholecystitis. There is a remnant of the gallbladder that measures 3.0 x 1.9 cm likely from a subtotal cholecystectomy. This gallbladder stump contains small gallstones at with moderate surrounding inflammatory stranding.   No evidence of perforati Hosp Day # 0 PCP Catalina Dobbins MD     Date:  8/9/2020  Date of Admission:  8/8/2020    History provided by:patient/ ER MD/NOTES/ NH NOTES  Chief Complaint:   Patient presents with:  Back Pain    HPI:   Bernard Lopez is a(n) 49year old male.      Omar General Appearance:    Alert, cooperative, no distress, appears stated age   Head:    Normocephalic,  atraumatic   Neck:   Supple, symmetrical, trachea midline,        thyroid:  No enlargement/tenderness; no  JVD   Lungs:     Clear to auscultation bilatera CONCLUSION:  1. Negative for pulmonary embolism or thoracic aortic dissection. 2. No acute thoracic abnormality. 3. There is minimal new stranding around the right kidney compared to the prior study from 5/12/2017.   In the acute setting consider inflammati WEAKNESS- PT OT EVAL AND TREAT  DISPOSITION PER     See tests ordered,  Available and radiology reviewed  All consultant notes reviewed  Discussed with nursing on floor  dvt prophylaxis reviewed  PT and/or OT  Caitlin Rg MD  8/9/2020    IN Pt is comfortable with this recommendation.   He does not have any further questions at this time and will proceed as discussed  Jason Ozuna MD [facility]    Surgical Oncology  /67 (BP Location: Right arm)   Pulse 84   Temp 99.3 °F (37.4 °C) (Oral)   Re        0858-Given            lisinopril tab 10 mg   Dose: 10 mg  Freq: Daily Route: OR  Start: 08/09/20 1130     (1300)-Not Given          0857-Given            melatonin tab 3 mg   Dose: 3 mg  Freq: Nightly Route: OR  Start: 08/09/20 2100     2337-Given Rate: 100 mL/hr Freq: Continuous Route: IV  Start: 08/09/20 0545     0538-Restarted   2040-New Bag        0710-New Bag            0.9% NaCl infusion   Rate: 125 mL/hr Freq: Continuous Route: IV  Last Dose: Stopped (08/09/20 0600)  Start: 08/09/20 0430 End: 0904-Given   1017-Given          ondansetron HCl (ZOFRAN) injection 4 mg   Dose: 4 mg  Freq: Every 6 hours PRN Route: IV  PRN Reason: Nausea  Start: 08/09/20 0529     1129-Given   2104-Given                 REVIEWER COMMENTS:     FOR REVIEW/APPROVAL OF

## 2020-08-10 NOTE — PROGRESS NOTES
Dr. Mary Ko asked this RN to ask [facility] if they would like to be consulted. Spoke to ALLYN LONDON from [facility], will look into it.
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
## 2020-08-10 NOTE — PROGRESS NOTES
Pt requesting Tums for heartburn, internal medicine MD paged at this time. [id number] - internal MD paged again at this time for tums order. 2105 - call received back, orders received.

## 2020-08-10 NOTE — PROGRESS NOTES
[facility]  TEL: [phone]  FAX: [phone]    Tierra Ramos Patient Status:  Inpatient    1965 MRN [id number]   [facility] 3NW-A Attending Sammy Saenz MD   Hosp Day # 1 PCP Jannie Corona Growth 1 Day N/A         Radiology: Xr Chest Ap Portable  (cpt=71045)    Result Date: 8/3/2020  PROCEDURE:  XR CHEST AP PORTABLE  (CPT=71045)  TECHNIQUE:  AP chest radiograph was obtained.   COMPARISON:  [facility] , XR, XR CHEST AP PORTABLE  (CPT=71045), 1/20/ WALL:  No axillary adenopathy. AORTA:  Normal caliber. No dissection. VASCULATURE:  Smooth tapering. No filling defect. ABDOMEN/PELVIS: LIVER:  Uniform parenchyma. BILIARY:  Surgically absent gallbladder. No biliary dilatation.   Again demonstrated is a 8/03/2020 at 3:48 PM       Ct Abdomen Pelvis Iv Contrast, No Oral (er)    Result Date: 8/9/2020  PROCEDURE:  CT ABDOMEN PELVIS IV CONTRAST, NO ORAL (ER)  COMPARISON:  [facility]ERA YOUNG, CT ABDOMEN+PELVIS KIDNEYSTONE 2D RNDR(NO IV,NO ORAL)(CPT=74176), 11/01/2017, grade 1 anterolisthesis of L5 on S1. LUNG BASES:  No visible pulmonary or pleural disease. Mild interstitial scarring at the posterior lung bases.          CONCLUSION:   Findings most consistent with stones and inflammation involving a dilated cystic duct

## 2020-08-10 NOTE — PLAN OF CARE
Pt alert and oriented x4. States that 1 mg of Dilaudid is too strong, but morphine was not strong enough. Offered 0.5 mg of Dilaudid, pt refused. Voiding into urinal to measure output. RA, . Tele, NSR. VSS. Denies nausea. Tums assisted with heartburn.  Joseph Calderón Free from fall injury  Description  INTERVENTIONS:  - Assess pt frequently for physical needs  - Identify cognitive and physical deficits and behaviors that affect risk of falls.   - [place] fall precautions as indicated by assessment.  - Educate pt/famil for assistance (call light)  - Provide an  as needed  - Communicate barriers and strategies to overcome with those who interact with patient  Outcome: Progressing

## 2020-08-10 NOTE — PROGRESS NOTES
[facility] Pharmacy Note: Route Optimization for Acetaminophen (OFIRMEV)    Patient is currently on acetaminophen (OFIRMEV) 1000 mg IV every 6 hours prn pain.   The patient meets the criteria to convert to the oral equivalent as established by the IV to Oral conve

## 2020-08-10 NOTE — RESPIRATORY THERAPY NOTE
NATALYA Equipment Usage Summary :            Set Mode :AUTO CPAP           Usage in Hours:8;53          90% Pressure (EPAP) : 9           90% Insp Pressure (IPAP);           AHI : 9.7          Supplemental Oxygen LPM :2          Auto cpap ( MAX PRESSURE ) setti

## 2020-08-11 ENCOUNTER — APPOINTMENT (OUTPATIENT)
Dept: GENERAL RADIOLOGY | Facility: HOSPITAL | Age: 55
DRG: 415 | End: 2020-08-11
Attending: SURGERY
Payer: COMMERCIAL

## 2020-08-11 ENCOUNTER — ANESTHESIA (OUTPATIENT)
Dept: SURGERY | Facility: HOSPITAL | Age: 55
DRG: 415 | End: 2020-08-11
Payer: COMMERCIAL

## 2020-08-11 ENCOUNTER — ANESTHESIA EVENT (OUTPATIENT)
Dept: SURGERY | Facility: HOSPITAL | Age: 55
DRG: 415 | End: 2020-08-11
Payer: COMMERCIAL

## 2020-08-11 LAB
ALBUMIN SERPL-MCNC: 2.9 G/DL (ref 3.4–5)
ALBUMIN/GLOB SERPL: 0.7 {RATIO} (ref 1–2)
ALP LIVER SERPL-CCNC: 68 U/L (ref 45–117)
ALT SERPL-CCNC: 39 U/L (ref 16–61)
ANION GAP SERPL CALC-SCNC: 2 MMOL/L (ref 0–18)
AST SERPL-CCNC: 23 U/L (ref 15–37)
BASOPHILS # BLD AUTO: 0.04 X10(3) UL (ref 0–0.2)
BASOPHILS NFR BLD AUTO: 0.4 %
BILIRUB SERPL-MCNC: 0.5 MG/DL (ref 0.1–2)
BUN BLD-MCNC: 8 MG/DL (ref 7–18)
BUN/CREAT SERPL: 7.7 (ref 10–20)
CALCIUM BLD-MCNC: 9 MG/DL (ref 8.5–10.1)
CHLORIDE SERPL-SCNC: 104 MMOL/L (ref 98–112)
CO2 SERPL-SCNC: 30 MMOL/L (ref 21–32)
CREAT BLD-MCNC: 1.04 MG/DL (ref 0.7–1.3)
DEPRECATED RDW RBC AUTO: 39.8 FL (ref 35.1–46.3)
EOSINOPHIL # BLD AUTO: 0.44 X10(3) UL (ref 0–0.7)
EOSINOPHIL NFR BLD AUTO: 4.8 %
ERYTHROCYTE [DISTWIDTH] IN BLOOD BY AUTOMATED COUNT: 11.7 % (ref 11–15)
GLOBULIN PLAS-MCNC: 4.2 G/DL (ref 2.8–4.4)
GLUCOSE BLD-MCNC: 88 MG/DL (ref 70–99)
HCT VFR BLD AUTO: 40.1 % (ref 39–53)
HGB BLD-MCNC: 13.7 G/DL (ref 13–17.5)
IMM GRANULOCYTES # BLD AUTO: 0.02 X10(3) UL (ref 0–1)
IMM GRANULOCYTES NFR BLD: 0.2 %
LYMPHOCYTES # BLD AUTO: 2.63 X10(3) UL (ref 1–4)
LYMPHOCYTES NFR BLD AUTO: 28.9 %
M PROTEIN MFR SERPL ELPH: 7.1 G/DL (ref 6.4–8.2)
MCH RBC QN AUTO: 31.7 PG (ref 26–34)
MCHC RBC AUTO-ENTMCNC: 34.2 G/DL (ref 31–37)
MCV RBC AUTO: 92.8 FL (ref 80–100)
MONOCYTES # BLD AUTO: 1.24 X10(3) UL (ref 0.1–1)
MONOCYTES NFR BLD AUTO: 13.6 %
NEUTROPHILS # BLD AUTO: 4.72 X10 (3) UL (ref 1.5–7.7)
NEUTROPHILS # BLD AUTO: 4.72 X10(3) UL (ref 1.5–7.7)
NEUTROPHILS NFR BLD AUTO: 52.1 %
OSMOLALITY SERPL CALC.SUM OF ELEC: 280 MOSM/KG (ref 275–295)
PLATELET # BLD AUTO: 179 10(3)UL (ref 150–450)
POTASSIUM SERPL-SCNC: 4.4 MMOL/L (ref 3.5–5.1)
RBC # BLD AUTO: 4.32 X10(6)UL (ref 4.3–5.7)
SODIUM SERPL-SCNC: 136 MMOL/L (ref 136–145)
WBC # BLD AUTO: 9.1 X10(3) UL (ref 4–11)

## 2020-08-11 PROCEDURE — 0FT40ZZ RESECTION OF GALLBLADDER, OPEN APPROACH: ICD-10-PCS | Performed by: SURGERY

## 2020-08-11 PROCEDURE — 74300 X-RAY BILE DUCTS/PANCREAS: CPT | Performed by: SURGERY

## 2020-08-11 PROCEDURE — BF141ZZ FLUOROSCOPY OF GALLBLADDER, BILE DUCTS AND PANCREATIC DUCTS USING LOW OSMOLAR CONTRAST: ICD-10-PCS | Performed by: SURGERY

## 2020-08-11 PROCEDURE — 76942 ECHO GUIDE FOR BIOPSY: CPT | Performed by: ANESTHESIOLOGY

## 2020-08-11 RX ORDER — LABETALOL HYDROCHLORIDE 5 MG/ML
INJECTION, SOLUTION INTRAVENOUS AS NEEDED
Status: DISCONTINUED | OUTPATIENT
Start: 2020-08-11 | End: 2020-08-11 | Stop reason: SURG

## 2020-08-11 RX ORDER — DEXAMETHASONE SODIUM PHOSPHATE 4 MG/ML
VIAL (ML) INJECTION AS NEEDED
Status: DISCONTINUED | OUTPATIENT
Start: 2020-08-11 | End: 2020-08-11 | Stop reason: SURG

## 2020-08-11 RX ORDER — HYDROMORPHONE HYDROCHLORIDE 1 MG/ML
0.4 INJECTION, SOLUTION INTRAMUSCULAR; INTRAVENOUS; SUBCUTANEOUS EVERY 5 MIN PRN
Status: DISCONTINUED | OUTPATIENT
Start: 2020-08-11 | End: 2020-08-11 | Stop reason: HOSPADM

## 2020-08-11 RX ORDER — GLYCOPYRROLATE 0.2 MG/ML
INJECTION, SOLUTION INTRAMUSCULAR; INTRAVENOUS AS NEEDED
Status: DISCONTINUED | OUTPATIENT
Start: 2020-08-11 | End: 2020-08-11 | Stop reason: SURG

## 2020-08-11 RX ORDER — SODIUM CHLORIDE, SODIUM LACTATE, POTASSIUM CHLORIDE, CALCIUM CHLORIDE 600; 310; 30; 20 MG/100ML; MG/100ML; MG/100ML; MG/100ML
INJECTION, SOLUTION INTRAVENOUS CONTINUOUS
Status: DISCONTINUED | OUTPATIENT
Start: 2020-08-11 | End: 2020-08-11 | Stop reason: HOSPADM

## 2020-08-11 RX ORDER — PHENYLEPHRINE HCL 10 MG/ML
VIAL (ML) INJECTION AS NEEDED
Status: DISCONTINUED | OUTPATIENT
Start: 2020-08-11 | End: 2020-08-11 | Stop reason: SURG

## 2020-08-11 RX ORDER — ACETAMINOPHEN 10 MG/ML
INJECTION, SOLUTION INTRAVENOUS
Status: COMPLETED
Start: 2020-08-11 | End: 2020-08-11

## 2020-08-11 RX ORDER — ONDANSETRON 2 MG/ML
4 INJECTION INTRAMUSCULAR; INTRAVENOUS AS NEEDED
Status: DISCONTINUED | OUTPATIENT
Start: 2020-08-11 | End: 2020-08-11 | Stop reason: HOSPADM

## 2020-08-11 RX ORDER — ONDANSETRON 2 MG/ML
INJECTION INTRAMUSCULAR; INTRAVENOUS AS NEEDED
Status: DISCONTINUED | OUTPATIENT
Start: 2020-08-11 | End: 2020-08-11 | Stop reason: SURG

## 2020-08-11 RX ORDER — FAMOTIDINE 10 MG/ML
20 INJECTION, SOLUTION INTRAVENOUS 2 TIMES DAILY
Status: DISCONTINUED | OUTPATIENT
Start: 2020-08-11 | End: 2020-08-15

## 2020-08-11 RX ORDER — MORPHINE SULFATE 4 MG/ML
2 INJECTION, SOLUTION INTRAMUSCULAR; INTRAVENOUS EVERY 5 MIN PRN
Status: DISCONTINUED | OUTPATIENT
Start: 2020-08-11 | End: 2020-08-11 | Stop reason: HOSPADM

## 2020-08-11 RX ORDER — NALOXONE HYDROCHLORIDE 0.4 MG/ML
80 INJECTION, SOLUTION INTRAMUSCULAR; INTRAVENOUS; SUBCUTANEOUS AS NEEDED
Status: DISCONTINUED | OUTPATIENT
Start: 2020-08-11 | End: 2020-08-11 | Stop reason: HOSPADM

## 2020-08-11 RX ORDER — MORPHINE SULFATE 4 MG/ML
2 INJECTION, SOLUTION INTRAMUSCULAR; INTRAVENOUS
Status: DISCONTINUED | OUTPATIENT
Start: 2020-08-11 | End: 2020-08-12 | Stop reason: ALTCHOICE

## 2020-08-11 RX ORDER — NEOSTIGMINE METHYLSULFATE 1 MG/ML
INJECTION INTRAVENOUS AS NEEDED
Status: DISCONTINUED | OUTPATIENT
Start: 2020-08-11 | End: 2020-08-11 | Stop reason: SURG

## 2020-08-11 RX ORDER — FAMOTIDINE 20 MG/1
20 TABLET ORAL 2 TIMES DAILY
Status: DISCONTINUED | OUTPATIENT
Start: 2020-08-11 | End: 2020-08-19

## 2020-08-11 RX ORDER — MIDAZOLAM HYDROCHLORIDE 1 MG/ML
INJECTION INTRAMUSCULAR; INTRAVENOUS AS NEEDED
Status: DISCONTINUED | OUTPATIENT
Start: 2020-08-11 | End: 2020-08-11 | Stop reason: SURG

## 2020-08-11 RX ORDER — ONDANSETRON 2 MG/ML
4 INJECTION INTRAMUSCULAR; INTRAVENOUS EVERY 6 HOURS PRN
Status: DISCONTINUED | OUTPATIENT
Start: 2020-08-11 | End: 2020-08-20

## 2020-08-11 RX ORDER — CYCLOBENZAPRINE HCL 5 MG
5 TABLET ORAL ONCE
Status: COMPLETED | OUTPATIENT
Start: 2020-08-11 | End: 2020-08-11

## 2020-08-11 RX ORDER — LIDOCAINE HYDROCHLORIDE 40 MG/ML
INJECTION, SOLUTION RETROBULBAR; TOPICAL AS NEEDED
Status: DISCONTINUED | OUTPATIENT
Start: 2020-08-11 | End: 2020-08-11 | Stop reason: SURG

## 2020-08-11 RX ORDER — ENOXAPARIN SODIUM 100 MG/ML
40 INJECTION SUBCUTANEOUS NIGHTLY
Status: DISCONTINUED | OUTPATIENT
Start: 2020-08-12 | End: 2020-08-20

## 2020-08-11 RX ORDER — MORPHINE SULFATE 4 MG/ML
INJECTION, SOLUTION INTRAMUSCULAR; INTRAVENOUS
Status: COMPLETED
Start: 2020-08-11 | End: 2020-08-11

## 2020-08-11 RX ORDER — ROCURONIUM BROMIDE 10 MG/ML
INJECTION, SOLUTION INTRAVENOUS AS NEEDED
Status: DISCONTINUED | OUTPATIENT
Start: 2020-08-11 | End: 2020-08-11 | Stop reason: SURG

## 2020-08-11 RX ORDER — SODIUM CHLORIDE, SODIUM LACTATE, POTASSIUM CHLORIDE, CALCIUM CHLORIDE 600; 310; 30; 20 MG/100ML; MG/100ML; MG/100ML; MG/100ML
INJECTION, SOLUTION INTRAVENOUS CONTINUOUS PRN
Status: DISCONTINUED | OUTPATIENT
Start: 2020-08-11 | End: 2020-08-11 | Stop reason: SURG

## 2020-08-11 RX ORDER — ACETAMINOPHEN 10 MG/ML
1000 INJECTION, SOLUTION INTRAVENOUS EVERY 6 HOURS
Status: DISCONTINUED | OUTPATIENT
Start: 2020-08-11 | End: 2020-08-12

## 2020-08-11 RX ORDER — ONDANSETRON 2 MG/ML
INJECTION INTRAMUSCULAR; INTRAVENOUS
Status: COMPLETED
Start: 2020-08-11 | End: 2020-08-11

## 2020-08-11 RX ADMIN — MIDAZOLAM HYDROCHLORIDE 2 MG: 1 INJECTION INTRAMUSCULAR; INTRAVENOUS at 12:52:00

## 2020-08-11 RX ADMIN — PHENYLEPHRINE HCL 100 MCG: 10 MG/ML VIAL (ML) INJECTION at 13:24:00

## 2020-08-11 RX ADMIN — PHENYLEPHRINE HCL 100 MCG: 10 MG/ML VIAL (ML) INJECTION at 13:43:00

## 2020-08-11 RX ADMIN — DEXAMETHASONE SODIUM PHOSPHATE 4 MG: 4 MG/ML VIAL (ML) INJECTION at 15:55:00

## 2020-08-11 RX ADMIN — ROCURONIUM BROMIDE 30 MG: 10 INJECTION, SOLUTION INTRAVENOUS at 14:20:00

## 2020-08-11 RX ADMIN — NEOSTIGMINE METHYLSULFATE 5 MG: 1 INJECTION INTRAVENOUS at 15:55:00

## 2020-08-11 RX ADMIN — ONDANSETRON 4 MG: 2 INJECTION INTRAMUSCULAR; INTRAVENOUS at 15:40:00

## 2020-08-11 RX ADMIN — ROCURONIUM BROMIDE 20 MG: 10 INJECTION, SOLUTION INTRAVENOUS at 13:38:00

## 2020-08-11 RX ADMIN — GLYCOPYRROLATE 0.6 MG: 0.2 INJECTION, SOLUTION INTRAMUSCULAR; INTRAVENOUS at 15:55:00

## 2020-08-11 RX ADMIN — ROCURONIUM BROMIDE 50 MG: 10 INJECTION, SOLUTION INTRAVENOUS at 13:04:00

## 2020-08-11 RX ADMIN — SODIUM CHLORIDE, SODIUM LACTATE, POTASSIUM CHLORIDE, CALCIUM CHLORIDE: 600; 310; 30; 20 INJECTION, SOLUTION INTRAVENOUS at 15:42:00

## 2020-08-11 RX ADMIN — PHENYLEPHRINE HCL 100 MCG: 10 MG/ML VIAL (ML) INJECTION at 14:08:00

## 2020-08-11 RX ADMIN — DEXAMETHASONE SODIUM PHOSPHATE 8 MG: 4 MG/ML VIAL (ML) INJECTION at 13:14:00

## 2020-08-11 RX ADMIN — SODIUM CHLORIDE, SODIUM LACTATE, POTASSIUM CHLORIDE, CALCIUM CHLORIDE: 600; 310; 30; 20 INJECTION, SOLUTION INTRAVENOUS at 13:16:00

## 2020-08-11 RX ADMIN — PHENYLEPHRINE HCL 100 MCG: 10 MG/ML VIAL (ML) INJECTION at 13:31:00

## 2020-08-11 RX ADMIN — LABETALOL HYDROCHLORIDE 5 MG: 5 INJECTION, SOLUTION INTRAVENOUS at 15:59:00

## 2020-08-11 RX ADMIN — LIDOCAINE HYDROCHLORIDE 3 ML: 40 INJECTION, SOLUTION RETROBULBAR; TOPICAL at 12:56:00

## 2020-08-11 NOTE — PROGRESS NOTES
[facility]  Progress Note    Chip Bruner Patient Status:  Inpatient    1965 MRN [id number]   [facility] 3NW-A Attending Wilma Walsh MD   Hosp Day # 2 PCP Sophy White MD         SUBJECTIVE:  Subjective:  Parul Blanchard WBC 13.7* 11.1* 9.1   HGB 16.2 14.6 13.7   MCV 90.8 93.0 92.8   .0 175.0 179.0       Recent Labs   Lab 08/08/20  2349 08/10/20  0639 08/11/20  0841   * 131* 136   K 4.3 4.5 4.4    100 104   CO2 31.0 28.0 30.0   BUN 18 12 8   CREATSERUM post- injection of non-ionic intravenous contrast material. Multi-planar reformatted/3-D images were created to optimize visualization of vascular anatomy. Dose reduction techniques were used.  Dose information is transmitted to the [employer] o seen elsewhere in the spine, mostly lower thoracic. CONCLUSION:  1. Negative for pulmonary embolism or thoracic aortic dissection. 2. No acute thoracic abnormality.  3. There is minimal new stranding around the right kidney compared to the prior may inflammation. There is no intrahepatic ductal dilatation. PANCREAS:  No lesion, fluid collection, ductal dilatation, or atrophy. SPLEEN:  No enlargement or focal lesion. KIDNEYS:  No mass, obstruction, or calcification.   ADRENALS:  No mass or enlargemen List:     Pure hypercholesterolemia     Essential hypertension, benign     IBS (irritable bowel syndrome)     GERD (gastroesophageal reflux disease)     Recurrent sinusitis     NATALYA on CPAP     Vitamin D deficiency     Inguinal hernia, right     Status post 2009     Sleep apnea uses CPAP     DVT prophylaxis–subcu heparin        Recent episode of chest pain with ER visit on Monday with negative stress test results as follows    IMPRESSION:  Normal stress echocardiogram.  The study was technically suboptimal fo

## 2020-08-11 NOTE — ANESTHESIA PROCEDURE NOTES
Airway  Urgency: elective      General Information and Staff    Patient location during procedure: OR  Anesthesiologist: Roly Leary MD  Performed: anesthesiologist     Indications and Patient Condition  Indications for airway management: anesthesia  S

## 2020-08-11 NOTE — ANESTHESIA PREPROCEDURE EVALUATION
PRE-OP EVALUATION    Patient Name: Tierra Ramos    Pre-op Diagnosis: Acute cholecystitis [K81.0]    Procedure(s):  OPEN CHOLECYSTECTOMY WITH INTRAOPERATIVE CHOLANGIOGRAMS    Surgeon(s) and Role:     * Shawna Hunter MD - Primary     * Reza Olivera MD Sodium ER (DEPAKOTE) 24 hr tab 500 mg, 500 mg, Oral, Nightly  [MAR Hold] lisinopril tab 10 mg, 10 mg, Oral, Daily  [MAR Hold] Pravastatin Sodium (PRAVACHOL) tab 20 mg, 20 mg, Oral, Nightly  [MAR Hold] melatonin tab 3 mg, 3 mg, Oral, Nightly  [MAR Hold] Mon Lovastatin 40 MG Oral Tab, Take 40 mg by mouth nightly., Disp: , Rfl:   Loperamide HCl (IMODIUM) 2 MG Oral Cap, Take 2 mg by mouth 4 (four) times daily as needed for Diarrhea., Disp: , Rfl:         Allergies: Clarithromycin;  Doxycycline; Levaquin      An CHOLECYSTECTOMY  3/3/2008    with bile leak   • COLONOSCOPY  1/15/2009    hemorrhoids   • EGD     • HERNIA SURGERY  3/7/12    laparoscopic right inguinal hernia repair with mesh by Dr. Mimi Diego @ [address] [place] [address]  6/8/2006   • OTHER WOLF vomiting, dental trauma, pain management modalities, transfusion if needed, etc. Discussed block for post-op analgesia. Questions answered and options explained. Patient accepts and wishes to proceed. The consent was signed without further questions.     Pl

## 2020-08-11 NOTE — PLAN OF CARE
Pt is A&O, VSS, with moderate c/o pain to RLQ, medication given. Pt is on RA with bilateral clear lung sounds,  in place. NATALYA with CPAP. NSR on telemetry. Abdomen is round, soft, and tender to RLQ with active bowel sounds. Pt is voiding freely.  Mary Bey

## 2020-08-11 NOTE — PROGRESS NOTES
[facility]    Progress Note    Yasmeen Vaca Patient Status:  Inpatient    1965 MRN [id number]   [facility] 3NW-A Attending Mame Dejesus MD   Hosp Day # 2 PCP Yunior Arora MD     Subjective:  Yasmeen Vaca is a 49 year
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
## 2020-08-11 NOTE — BRIEF OP NOTE
Pre-Operative Diagnosis: Acute cholecystitis [K81.0]     Post-Operative Diagnosis: Acute cholecystitis [K81.0]      Procedure Performed:   Procedure(s):  OPEN CHOLECYSTECTOMY WITH INTRAOPERATIVE CHOLANGIOGRAM, LYSIS OF ADHESIONS    Surgeon(s) and Role:

## 2020-08-11 NOTE — PROGRESS NOTES
[address] [facility]  TEL: [phone]  FAX: [phone]    Pranav Kaylyn Patient Status:  Inpatient    1965 MRN [id number]   [facility] 3NW-A Attending Sae Perez MD   [facility] Day # 2 PCP Isabel Mustafa 0.6 0.8 0.5   TP 8.0 7.0 7.1           Microbiology    Reviewed in EMR,   Hospital Encounter on 08/08/20   1.  BLOOD CULTURE     Status: None (Preliminary result)    Collection Time: 08/09/20 12:35 AM   Result Value Ref Range    Blood Culture Result No Grow

## 2020-08-11 NOTE — RESPIRATORY THERAPY NOTE
NATALYA : EQUIPMENT USE: DAILY SUMMARY                                            SET MODE: AUTO CPAP                                          USAGE IN HOURS:6:25                                          90% EXP. PRESSU

## 2020-08-11 NOTE — PLAN OF CARE
Assumed care at 1815. Pt was a&o x4. IVF with morphine pca. C/o pain with pca. Eating chips of ice. Will continue to monitor.

## 2020-08-11 NOTE — ANESTHESIA PROCEDURE NOTES
Regional Block  Performed by: Julian Cheng MD  Authorized by: Julian Cheng MD       General Information and Staff    Start Time:  8/11/2020 4:00 PM  End Time:  8/11/2020 4:05 PM  Anesthesiologist:  Julian Cheng MD  Patient Location:  OR    Block P

## 2020-08-11 NOTE — ANESTHESIA POSTPROCEDURE EVALUATION
[address] [facility] Patient Status:  Inpatient   Age/Gender 49year old male MRN [id number]   [facility] SURGERY Attending Wayne Ruiz, [facility] Se Day # 2 PCP Farida Ritchie MD       Anesthesia Post-op Note    Procedure(s):

## 2020-08-11 NOTE — BRIEF OP NOTE
Pre-Operative Diagnosis: Acute cholecystitis [K81.0]     Post-Operative Diagnosis: Acute cholecystitis [K81.0]      Procedure Performed:   OPEN CHOLECYSTECTOMY WITH INTRAOPERATIVE CHOLANGIOGRAM, LYSIS OF ADHESIONS    Surgeon(s) and Role:     * Mary Pacheco

## 2020-08-12 LAB
ALBUMIN SERPL-MCNC: 2.7 G/DL (ref 3.4–5)
ALBUMIN/GLOB SERPL: 0.7 {RATIO} (ref 1–2)
ALP LIVER SERPL-CCNC: 61 U/L (ref 45–117)
ALT SERPL-CCNC: 94 U/L (ref 16–61)
ANION GAP SERPL CALC-SCNC: 2 MMOL/L (ref 0–18)
AST SERPL-CCNC: 74 U/L (ref 15–37)
BASOPHILS # BLD AUTO: 0.01 X10(3) UL (ref 0–0.2)
BASOPHILS NFR BLD AUTO: 0.1 %
BILIRUB SERPL-MCNC: 0.3 MG/DL (ref 0.1–2)
BUN BLD-MCNC: 9 MG/DL (ref 7–18)
BUN/CREAT SERPL: 8.6 (ref 10–20)
CALCIUM BLD-MCNC: 8.5 MG/DL (ref 8.5–10.1)
CHLORIDE SERPL-SCNC: 105 MMOL/L (ref 98–112)
CO2 SERPL-SCNC: 30 MMOL/L (ref 21–32)
CREAT BLD-MCNC: 1.05 MG/DL (ref 0.7–1.3)
DEPRECATED RDW RBC AUTO: 40.5 FL (ref 35.1–46.3)
EOSINOPHIL # BLD AUTO: 0 X10(3) UL (ref 0–0.7)
EOSINOPHIL NFR BLD AUTO: 0 %
ERYTHROCYTE [DISTWIDTH] IN BLOOD BY AUTOMATED COUNT: 11.8 % (ref 11–15)
GLOBULIN PLAS-MCNC: 4.1 G/DL (ref 2.8–4.4)
GLUCOSE BLD-MCNC: 115 MG/DL (ref 70–99)
GLUCOSE BLD-MCNC: 95 MG/DL (ref 70–99)
HAV IGM SER QL: 2.2 MG/DL (ref 1.6–2.6)
HCT VFR BLD AUTO: 37.6 % (ref 39–53)
HGB BLD-MCNC: 12.7 G/DL (ref 13–17.5)
IMM GRANULOCYTES # BLD AUTO: 0.05 X10(3) UL (ref 0–1)
IMM GRANULOCYTES NFR BLD: 0.4 %
LYMPHOCYTES # BLD AUTO: 1.58 X10(3) UL (ref 1–4)
LYMPHOCYTES NFR BLD AUTO: 13.3 %
M PROTEIN MFR SERPL ELPH: 6.8 G/DL (ref 6.4–8.2)
MCH RBC QN AUTO: 31.8 PG (ref 26–34)
MCHC RBC AUTO-ENTMCNC: 33.8 G/DL (ref 31–37)
MCV RBC AUTO: 94 FL (ref 80–100)
MONOCYTES # BLD AUTO: 1.6 X10(3) UL (ref 0.1–1)
MONOCYTES NFR BLD AUTO: 13.5 %
NEUTROPHILS # BLD AUTO: 8.63 X10 (3) UL (ref 1.5–7.7)
NEUTROPHILS # BLD AUTO: 8.63 X10(3) UL (ref 1.5–7.7)
NEUTROPHILS NFR BLD AUTO: 72.7 %
OSMOLALITY SERPL CALC.SUM OF ELEC: 284 MOSM/KG (ref 275–295)
PHOSPHATE SERPL-MCNC: 3.3 MG/DL (ref 2.5–4.9)
PLATELET # BLD AUTO: 188 10(3)UL (ref 150–450)
POTASSIUM SERPL-SCNC: 4.1 MMOL/L (ref 3.5–5.1)
RBC # BLD AUTO: 4 X10(6)UL (ref 4.3–5.7)
SODIUM SERPL-SCNC: 137 MMOL/L (ref 136–145)
WBC # BLD AUTO: 11.9 X10(3) UL (ref 4–11)

## 2020-08-12 RX ORDER — ACETAMINOPHEN 500 MG
1000 TABLET ORAL EVERY 6 HOURS
Status: DISCONTINUED | OUTPATIENT
Start: 2020-08-12 | End: 2020-08-12

## 2020-08-12 RX ORDER — DIPHENHYDRAMINE HYDROCHLORIDE 50 MG/ML
12.5 INJECTION INTRAMUSCULAR; INTRAVENOUS EVERY 4 HOURS PRN
Status: DISCONTINUED | OUTPATIENT
Start: 2020-08-12 | End: 2020-08-14

## 2020-08-12 RX ORDER — HYDROCODONE BITARTRATE AND ACETAMINOPHEN 10; 325 MG/1; MG/1
1 TABLET ORAL EVERY 4 HOURS
Status: DISCONTINUED | OUTPATIENT
Start: 2020-08-12 | End: 2020-08-14

## 2020-08-12 RX ORDER — NALOXONE HYDROCHLORIDE 0.4 MG/ML
0.08 INJECTION, SOLUTION INTRAMUSCULAR; INTRAVENOUS; SUBCUTANEOUS
Status: DISCONTINUED | OUTPATIENT
Start: 2020-08-12 | End: 2020-08-14

## 2020-08-12 NOTE — PROGRESS NOTES
[facility]  Progress Note    Sorin Bilis Patient Status:  Inpatient    1965 MRN [id number]   [facility] 3NW-A Attending Harinder Burnett MD   [facility] Day # 3 PCP Nery Putnam MD         SUBJECTIVE:  Subjective:  Alfredo Blanchard 08/10/20  0639 08/11/20  0841 08/12/20  0628   ALT 44 37 39 94*   AST 32 22 23 74*   ALB 3.8 3.1* 2.9* 2.7*       No results for input(s): PGLU in the last 168 hours. No results for input(s): URINE, CULTI, BLDSMR in the last 168 hours.     [facility] intravenous contrast material. Multi-planar reformatted/3-D images were created to optimize visualization of vascular anatomy. Dose reduction techniques were used.  Dose information is transmitted to the [facility] of Radiology) Shyanne Kenny [address] [place] [address] mostly lower thoracic. CONCLUSION:  1. Negative for pulmonary embolism or thoracic aortic dissection. 2. No acute thoracic abnormality. 3. There is minimal new stranding around the right kidney compared to the prior study from 5/12/2017.   In the ac intrahepatic ductal dilatation. PANCREAS:  No lesion, fluid collection, ductal dilatation, or atrophy. SPLEEN:  No enlargement or focal lesion. KIDNEYS:  No mass, obstruction, or calcification. ADRENALS:  No mass or enlargement.   AORTA/VASCULAR:  No ane hypercholesterolemia     Essential hypertension, benign     IBS (irritable bowel syndrome)     GERD (gastroesophageal reflux disease)     Recurrent sinusitis     NATALYA on CPAP     Vitamin D deficiency     Inguinal hernia, right     Status post right inguinal Acute cholecystitis  As above     History of cholecystectomy by Dr. Carolina Chinchilla at [facility] in 2009     Sleep apnea uses CPAP     DVT prophylaxis–subcu heparin        Recent episode of chest pain with ER visit on Monday with negative stress test results as fol

## 2020-08-12 NOTE — PROGRESS NOTES
POD #1 s/p open cholecystectomy with cholangiogram    Afebrile and VSS  Previous pain gone, only complains of incisional pain  Tolerating clear liquids  Denies N/V    /78 (BP Location: Left arm)   Pulse 82   Temp 98.1 °F (36.7 °C) (Oral)   Resp 17 I have personally seen and examined the patient and reviewed all relevant labs and reports. I agree with her physical exam and the data listed in the report.      I agree with the above listed assessment and plan and have modified the report to reflect my o

## 2020-08-12 NOTE — RESPIRATORY THERAPY NOTE
NATALYA : EQUIPMENT USE: DAILY SUMMARY                                            SET MODE:AUTO CPAP                                          USAGE IN HOURS:3:22                                          90% EXP. PRESSUR

## 2020-08-12 NOTE — PROGRESS NOTES
[address] [place] [address]  TEL: [phone]  FAX: [phone]    Harrisburgedil Teran Patient Status:  Inpatient    1965 MRN [id number]   [facility] 3NW-A Attending Kate Nicole MD   [facility] Day # 3 PCP Mauricio Cox FLUID CULT,AEROBIC AND ANAEROBIC     Status: None (Preliminary result)    Collection Time: 08/11/20  1:59 PM   Result Value Ref Range    Body Fld Smear No organisms seen N/A    Body Fld Smear 4+ Neutrophils seen N/A    Body Fld Smear This is a cytocentrifu service    D/w pt and RN. Will d/w Dr. Sathish Seay. Will follow    Troy Blake. Brady Cesar PA-C    ID ATTENDING ADDENDUM     Pt seen an examined independentlyearlier today, this is a late entry . Chart reviewed. Agree with above.  Note has been reviewed by me and

## 2020-08-12 NOTE — PHYSICAL THERAPY NOTE
PHYSICAL THERAPY EVALUATION - INPATIENT     Room Number: 6231/4186-G  Evaluation Date: 8/12/2020  Type of Evaluation: Initial  Physician Order: PT Eval and Treat    Presenting Problem: abdominal pain  Reason for Therapy: Mobility Dysfunction and Disc • Vitamin D deficiency    • Vomiting    • Wears glasses    • Weight gain        Past Surgical History  Past Surgical History:   Procedure Laterality Date   • APPENDECTOMY     • CHOLECYSTECTOMY  3/3/2008    with bile leak   • COLONOSCOPY  1/15/2009    hem O2 WALK     SPO2 Ambulation on Room Air: 96            AM-PAC '6-Clicks' INPATIENT SHORT FORM - BASIC MOBILITY  How much difficulty does the patient currently have. ..  -   Turning over in bed (including adjusting bedclothes, sheets and blankets)?: A Alray Stable factors impacting therapy include recent cholecystectomy. In this PT evaluation, the patient presents with the following impairments pain. Functional outcome measures completed include gait speed which is indicative of decreased mobility.   Based on this

## 2020-08-12 NOTE — DIETARY NOTE
[facility]    NUTRITION ASSESSMENT    Pt does not meet malnutrition criteria.     NUTRITION DIAGNOSIS/PROBLEM:    Increased nutrient needs related to increased demands of healing as evidenced by recent open cholecystectomy    NUTRITION INTERVENTION: No  Cultural/Ethnic/Amish Preferences Addresses: Yes    NUTRITION RELATED PHYSICAL FINDINGS:     1. Body Fat/Muscle Mass: well nourished per visual exam.     2. Fluid Accumulation: none per RN documentation per visual exam.    NUTRITION PRESCRIPTION:

## 2020-08-12 NOTE — PAYOR COMM NOTE
--------------  CONTINUED STAY REVIEW    Payor: Tu Agarwal  #:  [id number]  Authorization Number: [id number]    Admit date: 8/9/20  Admit time: [address]    Admitting Physician: Pily Avila MD  Attending Physician:  Marcia Carrera MD identifying a cystic structure coursing towards the remnant gallbladder consistent with cystic duct. This was circumferentially dissected and a ductotomy was performed in the cystic duct, through which a cholangiocatheter was placed and clipped in place. 08/12/2020     TP 6.8 08/12/2020     AST 74 08/12/2020     ALT 94 08/12/2020     MG 2.2 08/12/2020     PHOS 3.3 08/12/2020      A/P  -Doing well on POD #1  -Consult to pain service for management of post-operative pain  -Advance diet as tolerated, low fat, Given 5 mg Intravenous Darren Dudley MD      lactated ringers infusion     Date Action Dose Route User    8/11/2020 4123 New Bag (none) Intravenous Darren Dudley MD    8/11/2020 1316 New Bag (none) Intravenous Darren Dudley MD      lactated ringers i Sodium Chloride 0.9 % 60 mL local anesthetic mixture     Date Action Dose Route User    8/11/2020 1555 New Bag 60 mL Injection Chetan Davalos MD      phenylephrine HCl (DAVID-SYNEPHRINE) 10 MG/ML injection     Date Action Dose Route User    8/11/2020 1408 G

## 2020-08-12 NOTE — PROGRESS NOTES
[facility] Note:  Pain Consult    Yasmeen Vaca is a 49year old patient started on Morphine PCA by Dr. Zita hSort. Pharmacy was consulted to review medication profile and to discontinue previously ordered narcotics and sedatives.     Medication profile wa

## 2020-08-12 NOTE — PROGRESS NOTES
Acute Pain Service    PCA Follow-up Note    Indication: Post-Surgical.POD #1 s/p open cholecystectomy with cholangiogram    Patient appears comfortable - states his abdominal pain is 8/10 now after ambulating with PT and sitting in chair.  Reports pain i

## 2020-08-12 NOTE — OPERATIVE REPORT
[facility]    PATIENT'S NAME: Claude Lopez   ATTENDING PHYSICIAN: Ela Kilpatrick MD   OPERATING PHYSICIAN: Ela Kilpatrick MD   PATIENT ACCOUNT#:   [id number]    LOCATION:  [address] [place] [address]  MEDICAL RECORD #:   [id number]       DATE OF BIRTH:  [birth year] towards the remnant gallbladder consistent with cystic duct. This was circumferentially dissected and a ductotomy was performed in the cystic duct, through which a cholangiocatheter was placed and clipped in place.   Intraoperative cholangiography was perf

## 2020-08-13 ENCOUNTER — APPOINTMENT (OUTPATIENT)
Dept: GENERAL RADIOLOGY | Facility: HOSPITAL | Age: 55
DRG: 415 | End: 2020-08-13
Attending: INTERNAL MEDICINE
Payer: COMMERCIAL

## 2020-08-13 LAB
ALBUMIN SERPL-MCNC: 2.7 G/DL (ref 3.4–5)
ALBUMIN/GLOB SERPL: 0.7 {RATIO} (ref 1–2)
ALP LIVER SERPL-CCNC: 59 U/L (ref 45–117)
ALT SERPL-CCNC: 86 U/L (ref 16–61)
ANION GAP SERPL CALC-SCNC: 1 MMOL/L (ref 0–18)
AST SERPL-CCNC: 54 U/L (ref 15–37)
BASOPHILS # BLD AUTO: 0.05 X10(3) UL (ref 0–0.2)
BASOPHILS NFR BLD AUTO: 0.4 %
BILIRUB SERPL-MCNC: 0.4 MG/DL (ref 0.1–2)
BUN BLD-MCNC: 9 MG/DL (ref 7–18)
BUN/CREAT SERPL: 8.3 (ref 10–20)
CALCIUM BLD-MCNC: 8.5 MG/DL (ref 8.5–10.1)
CHLORIDE SERPL-SCNC: 100 MMOL/L (ref 98–112)
CO2 SERPL-SCNC: 33 MMOL/L (ref 21–32)
CREAT BLD-MCNC: 1.09 MG/DL (ref 0.7–1.3)
DEPRECATED RDW RBC AUTO: 41.9 FL (ref 35.1–46.3)
EOSINOPHIL # BLD AUTO: 0.48 X10(3) UL (ref 0–0.7)
EOSINOPHIL NFR BLD AUTO: 4.2 %
ERYTHROCYTE [DISTWIDTH] IN BLOOD BY AUTOMATED COUNT: 12 % (ref 11–15)
GLOBULIN PLAS-MCNC: 4.1 G/DL (ref 2.8–4.4)
GLUCOSE BLD-MCNC: 92 MG/DL (ref 70–99)
HAV IGM SER QL: 2.1 MG/DL (ref 1.6–2.6)
HCT VFR BLD AUTO: 37.8 % (ref 39–53)
HGB BLD-MCNC: 12.6 G/DL (ref 13–17.5)
IMM GRANULOCYTES # BLD AUTO: 0.04 X10(3) UL (ref 0–1)
IMM GRANULOCYTES NFR BLD: 0.4 %
LYMPHOCYTES # BLD AUTO: 3.15 X10(3) UL (ref 1–4)
LYMPHOCYTES NFR BLD AUTO: 27.7 %
M PROTEIN MFR SERPL ELPH: 6.8 G/DL (ref 6.4–8.2)
MCH RBC QN AUTO: 31.7 PG (ref 26–34)
MCHC RBC AUTO-ENTMCNC: 33.3 G/DL (ref 31–37)
MCV RBC AUTO: 95 FL (ref 80–100)
MONOCYTES # BLD AUTO: 1.75 X10(3) UL (ref 0.1–1)
MONOCYTES NFR BLD AUTO: 15.4 %
NEUTROPHILS # BLD AUTO: 5.89 X10 (3) UL (ref 1.5–7.7)
NEUTROPHILS # BLD AUTO: 5.89 X10(3) UL (ref 1.5–7.7)
NEUTROPHILS NFR BLD AUTO: 51.9 %
OSMOLALITY SERPL CALC.SUM OF ELEC: 276 MOSM/KG (ref 275–295)
PHOSPHATE SERPL-MCNC: 3.8 MG/DL (ref 2.5–4.9)
PLATELET # BLD AUTO: 216 10(3)UL (ref 150–450)
POTASSIUM SERPL-SCNC: 3.8 MMOL/L (ref 3.5–5.1)
RBC # BLD AUTO: 3.98 X10(6)UL (ref 4.3–5.7)
SODIUM SERPL-SCNC: 134 MMOL/L (ref 136–145)
WBC # BLD AUTO: 11.4 X10(3) UL (ref 4–11)

## 2020-08-13 PROCEDURE — 71046 X-RAY EXAM CHEST 2 VIEWS: CPT | Performed by: INTERNAL MEDICINE

## 2020-08-13 RX ORDER — TIZANIDINE 2 MG/1
2 TABLET ORAL EVERY 6 HOURS PRN
Status: DISCONTINUED | OUTPATIENT
Start: 2020-08-13 | End: 2020-08-20

## 2020-08-13 RX ORDER — POTASSIUM CHLORIDE 20 MEQ/1
40 TABLET, EXTENDED RELEASE ORAL ONCE
Status: COMPLETED | OUTPATIENT
Start: 2020-08-13 | End: 2020-08-13

## 2020-08-13 NOTE — RESPIRATORY THERAPY NOTE
NATALYA - Equipment Use Daily Summary:  · Set Mode CPAP  · Usage in hours: 7:02  · 90% Pressure (EPAP) level: 7.0  · 90% Insp Pressure (IPAP):   · AHI: 0.0  · Supplemental Oxygen:   · Comments:

## 2020-08-13 NOTE — PROGRESS NOTES
[facility]  TEL: [phone]  FAX: [phone]    Brenda Guallpa Patient Status:  Inpatient    1965 MRN [id number]   [facility] 3NW-A Attending Wayne Ruiz MD   [facility] Day # 4 PCP Luke Favre ALB 2.9* 2.7* 2.7*    137 134*   K 4.4 4.1 3.8    105 100   CO2 30.0 30.0 33.0*   ALKPHO 68 61 59   AST 23 74* 54*   ALT 39 94* 86*   BILT 0.5 0.3 0.4   TP 7.1 6.8 6.8           Microbiology    Reviewed in EMR,   Hospital Encounter on 08/08/2

## 2020-08-13 NOTE — PLAN OF CARE
Assumed care at [facility]. Pt a/ox4. VSS. NSR/ST per tele. RA. O2 @ 2l/nc prn, cpap at night. Pain controlled w/ pca morphine and norco scheduled. Mid abd incision w/ telfa, dry and intact. +BS, + belching, denies flatus. R CORINNA w/ SS drainage.   Voiding per

## 2020-08-13 NOTE — PLAN OF CARE
A&O x 4. Anxious and talkative. Tearful at times  Lungs clear but diminished. Only getting around 100o on IS. Flutter valve added. Pulm consulted for O2 needs. CXR PA& LAT done. 2L at rest. 1L added to CPAP  Norco scheduled.  Dizzy in am when trying to walk

## 2020-08-13 NOTE — DIETARY NOTE
Clinical Nutrition    Met with pt to discuss diet. No appetite, but eating. No n/v. + Flatus but no BM. Discussed low fat diet s/p open tish. Discussed low fiber diet after surgery. All questions answered.  Handout with contact info provided for questions

## 2020-08-13 NOTE — PROGRESS NOTES
POD #2 s/p open cholecystectomy with cholangiogram    Afebrile and VSS  Pain improved with current medication  Tolerating clear liquids, eating minimal, will order breakfast  Denies N/V  - flatus, - BM    /73 (BP Location: Left arm)   Pulse 104   Tem

## 2020-08-13 NOTE — CONSULTS
Pulmonary / Critical Care H&P/Consult       NAME: [address]: 4695/8596-H - MRN: [id number] - Age: 49year old - :  1965    Date of Admission: 2020 11:10 PM  Admission Diagnosis: Acute cholecystitis [K81.0]  Abdominal pain, acute [R1 after meals    • Vitamin D deficiency    • Vomiting    • Wears glasses    • Weight gain       Past Surgical History:   Procedure Laterality Date   • APPENDECTOMY     • CHOLECYSTECTOMY  3/3/2008    with bile leak   • CHOLECYSTECTOMY POSSIBLE COMMON DUCT EXP Minutes per session: Not on file      Stress: Not on file    Relationships      Social connections:        Talks on phone: Not on file        Gets together: Not on file        Attends Faith service: Not on file        Active member of club or organi Brother    • Seizure Disorder Brother    • Psychiatric Brother         Depression   • Other (Other) Brother    • Gastro-Intestinal Disorder Sister         Hepatitis C   • Psychiatric Sister         Anxiety, depression   • Hypertension Sister    • High Chol Or   • famoTIDine  20 mg Intravenous BID   • piperacillin-tazobactam  4.5 g Intravenous Q8H   • divalproex Sodium ER  500 mg Oral Nightly   • lisinopril  10 mg Oral Daily   • Pravastatin Sodium  20 mg Oral Nightly   • melatonin  3 mg Oral Nightly   • Moris to auscultation bilaterally, respirations unlabored   Chest wall:    No tenderness or deformity   Heart:    Regular rate and rhythm, S1 and S2 normal, no murmur, rub   or gallop   Abdomen:     Soft postop changes.  Dec bs   Extremities:   Extremities normal

## 2020-08-13 NOTE — PROGRESS NOTES
Acute Pain Service    PCA Follow-up Note    Indication: Post-Surgical.  POD # 2 s/p open cholecystectomy w/ intraoperative cholangiography     Assessment  Assessed pt sitting up in chair.  States pain well managed overnight; reports moderate abdominal/sx

## 2020-08-14 LAB
ALBUMIN SERPL-MCNC: 2.2 G/DL (ref 3.4–5)
ALBUMIN/GLOB SERPL: 0.6 {RATIO} (ref 1–2)
ALP LIVER SERPL-CCNC: 55 U/L (ref 45–117)
ALT SERPL-CCNC: 70 U/L (ref 16–61)
ANION GAP SERPL CALC-SCNC: 2 MMOL/L (ref 0–18)
AST SERPL-CCNC: 42 U/L (ref 15–37)
BASOPHILS # BLD AUTO: 0.04 X10(3) UL (ref 0–0.2)
BASOPHILS NFR BLD AUTO: 0.5 %
BILIRUB SERPL-MCNC: 0.2 MG/DL (ref 0.1–2)
BUN BLD-MCNC: 8 MG/DL (ref 7–18)
BUN/CREAT SERPL: 7.8 (ref 10–20)
CALCIUM BLD-MCNC: 8.3 MG/DL (ref 8.5–10.1)
CHLORIDE SERPL-SCNC: 103 MMOL/L (ref 98–112)
CO2 SERPL-SCNC: 30 MMOL/L (ref 21–32)
CREAT BLD-MCNC: 1.02 MG/DL (ref 0.7–1.3)
DEPRECATED RDW RBC AUTO: 42.5 FL (ref 35.1–46.3)
EOSINOPHIL # BLD AUTO: 0.45 X10(3) UL (ref 0–0.7)
EOSINOPHIL NFR BLD AUTO: 6 %
ERYTHROCYTE [DISTWIDTH] IN BLOOD BY AUTOMATED COUNT: 12 % (ref 11–15)
GLOBULIN PLAS-MCNC: 3.8 G/DL (ref 2.8–4.4)
GLUCOSE BLD-MCNC: 100 MG/DL (ref 70–99)
HAV IGM SER QL: 2.2 MG/DL (ref 1.6–2.6)
HCT VFR BLD AUTO: 34.2 % (ref 39–53)
HGB BLD-MCNC: 11.4 G/DL (ref 13–17.5)
IMM GRANULOCYTES # BLD AUTO: 0.04 X10(3) UL (ref 0–1)
IMM GRANULOCYTES NFR BLD: 0.5 %
LYMPHOCYTES # BLD AUTO: 2.24 X10(3) UL (ref 1–4)
LYMPHOCYTES NFR BLD AUTO: 30 %
M PROTEIN MFR SERPL ELPH: 6 G/DL (ref 6.4–8.2)
MCH RBC QN AUTO: 32.3 PG (ref 26–34)
MCHC RBC AUTO-ENTMCNC: 33.3 G/DL (ref 31–37)
MCV RBC AUTO: 96.9 FL (ref 80–100)
MONOCYTES # BLD AUTO: 1.29 X10(3) UL (ref 0.1–1)
MONOCYTES NFR BLD AUTO: 17.3 %
NEUTROPHILS # BLD AUTO: 3.41 X10 (3) UL (ref 1.5–7.7)
NEUTROPHILS # BLD AUTO: 3.41 X10(3) UL (ref 1.5–7.7)
NEUTROPHILS NFR BLD AUTO: 45.7 %
OSMOLALITY SERPL CALC.SUM OF ELEC: 278 MOSM/KG (ref 275–295)
PHOSPHATE SERPL-MCNC: 4 MG/DL (ref 2.5–4.9)
PLATELET # BLD AUTO: 165 10(3)UL (ref 150–450)
POTASSIUM SERPL-SCNC: 4.1 MMOL/L (ref 3.5–5.1)
RBC # BLD AUTO: 3.53 X10(6)UL (ref 4.3–5.7)
SODIUM SERPL-SCNC: 135 MMOL/L (ref 136–145)
WBC # BLD AUTO: 7.5 X10(3) UL (ref 4–11)

## 2020-08-14 RX ORDER — MORPHINE SULFATE 4 MG/ML
1 INJECTION, SOLUTION INTRAMUSCULAR; INTRAVENOUS EVERY 2 HOUR PRN
Status: DISCONTINUED | OUTPATIENT
Start: 2020-08-14 | End: 2020-08-15

## 2020-08-14 RX ORDER — MORPHINE SULFATE 4 MG/ML
4 INJECTION, SOLUTION INTRAMUSCULAR; INTRAVENOUS EVERY 2 HOUR PRN
Status: DISCONTINUED | OUTPATIENT
Start: 2020-08-14 | End: 2020-08-15

## 2020-08-14 RX ORDER — HYDROCODONE BITARTRATE AND ACETAMINOPHEN 10; 325 MG/1; MG/1
2 TABLET ORAL EVERY 4 HOURS
Status: DISCONTINUED | OUTPATIENT
Start: 2020-08-14 | End: 2020-08-15

## 2020-08-14 RX ORDER — MORPHINE SULFATE 4 MG/ML
2 INJECTION, SOLUTION INTRAMUSCULAR; INTRAVENOUS EVERY 2 HOUR PRN
Status: DISCONTINUED | OUTPATIENT
Start: 2020-08-14 | End: 2020-08-15

## 2020-08-14 NOTE — PROGRESS NOTES
Acute Pain Service    PCA Follow-up Note    Indication: Post-Surgical.  POD # 3 s/p open cholecystectomy w/ cholangiography     Assessment  Assessed pt in bed; pt in good spirits.  States pain well managed at rest; moderate with activity and deep breathi

## 2020-08-14 NOTE — PROGRESS NOTES
[address] [facility]  TEL: [phone]  FAX: [phone]    Josevincent Pageuriel Patient Status:  Inpatient    1965 MRN [id number]   [facility] 3NW-A Attending Ivan Melendez MD   [facility] Day # 5 PCP Burnis Guajardo CA 8.5 8.5 8.3*   ALB 2.7* 2.7* 2.2*    134* 135*   K 4.1 3.8 4.1    100 103   CO2 30.0 33.0* 30.0   ALKPHO 61 59 55   AST 74* 54* 42*   ALT 94* 86* 70*   BILT 0.3 0.4 0.2   TP 6.8 6.8 6.0*           Microbiology    Reviewed in [facility],   [facility] inflammation- stump cholecystitis. S/p open cholecystectomy with intraoperative cholangiogram and lysis of adhesions 8/14, per op report--> necrosis and probable abscess formation, cx with strep anginosus. Bcx 8/9 negative.   2. Acute febrile illness due to

## 2020-08-14 NOTE — PLAN OF CARE
Assumed care 1900  Patient alert and oriented x4  Morphine PCA for pain and scheduled norco  R CORINNA serosanginous  Mideline incision clean dry intact open to air with staples  Urine adequate  NSR on tele  Encouraged IS and flutter valve  1 L with CPAP 2 L wi

## 2020-08-14 NOTE — RESPIRATORY THERAPY NOTE
NATALYA - Equipment Use Daily Summary:  · Set Mode CPAP  · Usage in hours: 3.4  · 90% Pressure (EPAP) level: 7.0  · 90% Insp Pressure (IPAP):   · AHI: 2.1  · Supplemental Oxygen:   · Comments:

## 2020-08-14 NOTE — PROGRESS NOTES
[address] [facility] Patient Status:  Inpatient    1965 MRN [id number]   [facility] 7NE-A Attending Rudolph Moore MD   Hosp Day # 5 PCP Carny Martinez MD     Pulm / Critical Care Progress Note     S: overall feels a l ctab   Chest wall: No tenderness or deformity. Heart: Regular rate and rhythm, normal S1S2, no murmur. Abdomen: soft, postop changes.     Extremity: no edema     Recent Labs   Lab 08/12/20  0628 08/13/20  0549 08/14/20  0510   WBC 11.9* 11.4* 7.5   HGB

## 2020-08-14 NOTE — PROGRESS NOTES
POD #3 s/p open cholecystectomy with cholangiogram    Afebrile and VSS  Tolerating low fate diet   Denies N/V    /69 (BP Location: Left arm)   Pulse 94   Temp 98.5 °F (36.9 °C) (Oral)   Resp 18   Ht 1.753 m (5' 9\")   Wt 105.2 kg (231 lb 14.4 oz)   S

## 2020-08-14 NOTE — PROGRESS NOTES
[facility]  Progress Note    Melany Foreman Patient Status:  Inpatient    1965 MRN [id number]   [facility] 3NW-A Attending Josue Walters MD   [facility] Day # 5 PCP Angelina Martin MD         SUBJECTIVE:  Subjective:  Isaac Blanchard 12 8 9 9 8   CREATSERUM 1.15 1.04 1.05 1.09 1.02   CA 8.7 9.0 8.5 8.5 8.3*   MG  --   --  2.2 2.1 2.2   PHOS  --   --  3.3 3.8 4.0   * 88 115* 92 100*       Recent Labs   Lab 08/10/20  0639 08/11/20  0841 08/12/20  4864 08/13/20  0549 08/14/20  0510 Renae Cruz MD on 8/03/2020 at 11:29 AM     Finalized by (CST): Renae Cruz MD on 8/03/2020 at 11:29 AM       Cta Chest + Ct Abd (w) + Ct Pel (w) (cpt=71275/79599)    Result Date: 8/3/2020  PROCEDURE:  CTA CHEST + CT ABD (W) + CT PEL (W) (CPT=71275 ADRENALS:  Not enlarged. AORTA:  Smooth tapering. RETROPERITONEUM:  No adenopathy. BOWEL/MESENTERY:  Normal bowel caliber. No colonic inflammation. Surgically absent appendix. No free air. No ascites.  ABDOMINAL WALL:  Small fat containing umbilical her ([facility] of Radiology) NRDR ([address]) which includes the Dose Index Registry. PATIENT STATED HISTORY:(As transcribed by Technologist)  Patient is here with right side back pain.    CONTRAST USED:  100cc of Omnipaque 350  F mg Intravenous BID   • piperacillin-tazobactam  4.5 g Intravenous Q8H   • divalproex Sodium ER  500 mg Oral Nightly   • lisinopril  10 mg Oral Daily   • Pravastatin Sodium  20 mg Oral Nightly   • melatonin  3 mg Oral Nightly   • Montelukast Sodium  10 mg O management,      Abdominal pain, acute  =====  CONCLUSION:       Findings most consistent with stones and inflammation involving a dilated cystic duct remnant status post cholecystectomy.   Symptomatic choledocholithiasis, gallbladder fossa inflammation–may reviewed  Discussed with nursing on floor  dvt prophylaxis reviewed  PT and/or OT  Lillie Ha MD

## 2020-08-14 NOTE — RESPIRATORY THERAPY NOTE
Visited PT samantha. Pt stated he wanted to do some lung exercises because of his desaturation problems. Pt BS dim but clear and sats are high 90s (98%). Started with the IS and explained how to properly use.  Pt reaching a goal of 1250 at this time after do

## 2020-08-15 LAB
ALBUMIN SERPL-MCNC: 2.7 G/DL (ref 3.4–5)
ALBUMIN/GLOB SERPL: 0.6 {RATIO} (ref 1–2)
ALP LIVER SERPL-CCNC: 59 U/L (ref 45–117)
ALT SERPL-CCNC: 80 U/L (ref 16–61)
ANION GAP SERPL CALC-SCNC: 2 MMOL/L (ref 0–18)
AST SERPL-CCNC: 39 U/L (ref 15–37)
BASOPHILS # BLD AUTO: 0.05 X10(3) UL (ref 0–0.2)
BASOPHILS NFR BLD AUTO: 0.6 %
BILIRUB DIRECT SERPL-MCNC: <0.1 MG/DL (ref 0–0.2)
BILIRUB SERPL-MCNC: 0.2 MG/DL (ref 0.1–2)
BUN BLD-MCNC: 8 MG/DL (ref 7–18)
BUN/CREAT SERPL: 7.8 (ref 10–20)
CALCIUM BLD-MCNC: 8.9 MG/DL (ref 8.5–10.1)
CHLORIDE SERPL-SCNC: 98 MMOL/L (ref 98–112)
CO2 SERPL-SCNC: 36 MMOL/L (ref 21–32)
CREAT BLD-MCNC: 1.02 MG/DL (ref 0.7–1.3)
DEPRECATED RDW RBC AUTO: 40 FL (ref 35.1–46.3)
EOSINOPHIL # BLD AUTO: 0.55 X10(3) UL (ref 0–0.7)
EOSINOPHIL NFR BLD AUTO: 7 %
ERYTHROCYTE [DISTWIDTH] IN BLOOD BY AUTOMATED COUNT: 11.8 % (ref 11–15)
GLOBULIN PLAS-MCNC: 4.3 G/DL (ref 2.8–4.4)
GLUCOSE BLD-MCNC: 76 MG/DL (ref 70–99)
HAV IGM SER QL: 2.3 MG/DL (ref 1.6–2.6)
HCT VFR BLD AUTO: 37.9 % (ref 39–53)
HGB BLD-MCNC: 12.9 G/DL (ref 13–17.5)
IMM GRANULOCYTES # BLD AUTO: 0.07 X10(3) UL (ref 0–1)
IMM GRANULOCYTES NFR BLD: 0.9 %
LYMPHOCYTES # BLD AUTO: 3.15 X10(3) UL (ref 1–4)
LYMPHOCYTES NFR BLD AUTO: 40.1 %
M PROTEIN MFR SERPL ELPH: 7 G/DL (ref 6.4–8.2)
MCH RBC QN AUTO: 31.5 PG (ref 26–34)
MCHC RBC AUTO-ENTMCNC: 34 G/DL (ref 31–37)
MCV RBC AUTO: 92.7 FL (ref 80–100)
MONOCYTES # BLD AUTO: 1.08 X10(3) UL (ref 0.1–1)
MONOCYTES NFR BLD AUTO: 13.8 %
NEUTROPHILS # BLD AUTO: 2.95 X10 (3) UL (ref 1.5–7.7)
NEUTROPHILS # BLD AUTO: 2.95 X10(3) UL (ref 1.5–7.7)
NEUTROPHILS NFR BLD AUTO: 37.6 %
OSMOLALITY SERPL CALC.SUM OF ELEC: 279 MOSM/KG (ref 275–295)
PHOSPHATE SERPL-MCNC: 3.9 MG/DL (ref 2.5–4.9)
PLATELET # BLD AUTO: 261 10(3)UL (ref 150–450)
POTASSIUM SERPL-SCNC: 3.5 MMOL/L (ref 3.5–5.1)
RBC # BLD AUTO: 4.09 X10(6)UL (ref 4.3–5.7)
SODIUM SERPL-SCNC: 136 MMOL/L (ref 136–145)
WBC # BLD AUTO: 7.9 X10(3) UL (ref 4–11)

## 2020-08-15 RX ORDER — ACETAMINOPHEN 325 MG/1
325 TABLET ORAL EVERY 4 HOURS PRN
Status: DISCONTINUED | OUTPATIENT
Start: 2020-08-15 | End: 2020-08-20

## 2020-08-15 RX ORDER — POTASSIUM CHLORIDE 20 MEQ/1
40 TABLET, EXTENDED RELEASE ORAL ONCE
Status: COMPLETED | OUTPATIENT
Start: 2020-08-15 | End: 2020-08-15

## 2020-08-15 RX ORDER — MORPHINE SULFATE 4 MG/ML
1 INJECTION, SOLUTION INTRAMUSCULAR; INTRAVENOUS EVERY 4 HOURS PRN
Status: DISCONTINUED | OUTPATIENT
Start: 2020-08-15 | End: 2020-08-20

## 2020-08-15 RX ORDER — OXYCODONE HYDROCHLORIDE 15 MG/1
15 TABLET ORAL EVERY 6 HOURS
Status: DISCONTINUED | OUTPATIENT
Start: 2020-08-15 | End: 2020-08-16

## 2020-08-15 RX ORDER — MORPHINE SULFATE 4 MG/ML
2 INJECTION, SOLUTION INTRAMUSCULAR; INTRAVENOUS EVERY 4 HOURS PRN
Status: DISCONTINUED | OUTPATIENT
Start: 2020-08-15 | End: 2020-08-20

## 2020-08-15 RX ORDER — POLYETHYLENE GLYCOL 3350 17 G/17G
17 POWDER, FOR SOLUTION ORAL DAILY PRN
Status: DISCONTINUED | OUTPATIENT
Start: 2020-08-15 | End: 2020-08-20

## 2020-08-15 RX ORDER — ACETAMINOPHEN 325 MG/1
650 TABLET ORAL EVERY 4 HOURS PRN
Status: DISCONTINUED | OUTPATIENT
Start: 2020-08-15 | End: 2020-08-20

## 2020-08-15 RX ORDER — MORPHINE SULFATE 4 MG/ML
4 INJECTION, SOLUTION INTRAMUSCULAR; INTRAVENOUS EVERY 4 HOURS PRN
Status: DISCONTINUED | OUTPATIENT
Start: 2020-08-15 | End: 2020-08-20

## 2020-08-15 NOTE — PROGRESS NOTES
POD #4 s/p open cholecystectomy with cholangiogram    Afebrile and VSS  Tolerating low fat diet   Denies N/V  +flatus, - BM    /53 (BP Location: Left arm)   Pulse 69   Temp 97.7 °F (36.5 °C) (Oral)   Resp 16   Ht 1.753 m (5' 9\")   Wt 106.6 kg (235 l

## 2020-08-15 NOTE — PROGRESS NOTES
Acute Pain Service  POD # 4 s/p open cholecystectomy w/ cholangiography     Assessment  Assessed pt in bed. Rates abdominal/surgical pain 4-5/10 at rest which he states is manageable. Tolerating diet; denies nausea/emesis.  Has not required IV pain medicati

## 2020-08-15 NOTE — RESPIRATORY THERAPY NOTE
NATALYA : EQUIPMENT USE: DAILY SUMMARY                                            SET MODE: AUTO CPAP                                          USAGE IN HOURS:5:51                                          90% EXP. PRESSU

## 2020-08-15 NOTE — PROGRESS NOTES
[facility]  Progress Note    Eliseo Lefort Patient Status:  Inpatient    1965 MRN [id number]   [facility] 3NW-A Attending Cedric Rojas MD   [facility] Day # 6 PCP Mardene Pallas, MD         SUBJECTIVE:  Subjective:  Bel Blanchard 08/12/20  0628 08/13/20  0549 08/14/20  0510 08/15/20  0602    137 134* 135* 136   K 4.4 4.1 3.8 4.1 3.5    105 100 103 98   CO2 30.0 30.0 33.0* 30.0 36.0*   BUN 8 9 9 8 8   CREATSERUM 1.04 1.05 1.09 1.02 1.02   CA 9.0 8.5 8.5 8.3* 8.9   MG  -- limits. Visualized chest wall structures are normal.         CONCLUSION:  There is no evidence of active cardiopulmonary disease.     Dictated by (CST): Elayne Vizcarra MD on 8/03/2020 at 11:29 AM     Finalized by (CST): Elayne Vizcarra MD on 8/03/2020 at 11:2 splenule. KIDNEYS:  Normal anatomic positions. No hydronephrosis. There is new minimal stranding around the right kidney and also anterior para renal space. ADRENALS:  Not enlarged. AORTA:  Smooth tapering. RETROPERITONEUM:  No adenopathy.  BOWEL/MESENTER intravenous contrast material. Post contrast coronal MPR imaging was performed. Dose reduction techniques were used.  Dose information is transmitted to the [facility] ([address] [place] [address] of Radiology) Shyanne Kenny 35 ([address]) which includes the Dose Meds:     • oxyCODONE HCl  15 mg Oral Q6H   • enoxaparin  40 mg Subcutaneous Nightly   • famoTIDine  20 mg Oral BID   • piperacillin-tazobactam  4.5 g Intravenous Q8H   • divalproex Sodium ER  500 mg Oral Nightly   • lisinopril  10 mg Oral Daily acute on chronic cholecystitis without obstruction    Hx laparoscopic cholecystectomy    H/O acute pancreatitis          Plan:  Continue present management,      Abdominal pain, acute  =====  CONCLUSION:       Findings most consistent with stones and infla outpatient     Asthma–Singulair     WEAKNESS- PT OT EVAL AND TREAT  DISPOSITION PER      See tests ordered,  Available and radiology reviewed  All consultant notes reviewed  Discussed with nursing on floor  dvt prophylaxis reviewed  PT and/or

## 2020-08-15 NOTE — PLAN OF CARE
A&O x 4. Anxious and talkative. Tearful at times  Lungs clear but diminished. Only getting around 1000 on IS. O2 walk done with RA. Patient dropped on 88% momentarily. No oxygen needed while walking. Patient obsessed with his 18.  Patient does not need O2 w

## 2020-08-15 NOTE — PLAN OF CARE
Assumed care @ 0700  SpO2 >90% on RA. cpap while asleep. Productive cough with small amt of green sputum  Midline incision with staples RAKEL. R CORINNA with small amt of SS output  abd distended, tender to touch. Denies flatus since yesterday afternoon.  miralax

## 2020-08-15 NOTE — PROGRESS NOTES
Pulmonary Progress Note        NAME: Yasmeen Vaca - ROOM: 9635/4054-R - MRN: [id number] - Age: 49year old - : 1965    Past Medical History:   Diagnosis Date   • Abdominal distention    • Abdominal hernia    • Abdominal pain    • Acute cholecysti • piperacillin-tazobactam  4.5 g Intravenous Q8H   • divalproex Sodium ER  500 mg Oral Nightly   • lisinopril  10 mg Oral Daily   • Pravastatin Sodium  20 mg Oral Nightly   • melatonin  3 mg Oral Nightly   • Montelukast Sodium  10 mg Oral Nightly   • Pra evaluation  -follow up with Dr. Manuel Dunn IV in 2 weeks    [place]

## 2020-08-15 NOTE — PLAN OF CARE
Assumed care 1930. Pt A&Ox4. Anxious at times. NSR/ST with activity on tele. Needing intermittent O2, 2L  CPAP with 1L O2 while sleeping. Went on 2 walks overnight. IS and flutter valve encouraged. C/o mild abd pain. Scheduled norco given.  Pt state

## 2020-08-16 LAB
ALBUMIN SERPL-MCNC: 2.6 G/DL (ref 3.4–5)
ALP LIVER SERPL-CCNC: 55 U/L (ref 45–117)
ALT SERPL-CCNC: 74 U/L (ref 16–61)
AST SERPL-CCNC: 41 U/L (ref 15–37)
BASOPHILS # BLD AUTO: 0.05 X10(3) UL (ref 0–0.2)
BASOPHILS NFR BLD AUTO: 0.7 %
BILIRUB DIRECT SERPL-MCNC: 0.1 MG/DL (ref 0–0.2)
BILIRUB SERPL-MCNC: 0.3 MG/DL (ref 0.1–2)
DEPRECATED RDW RBC AUTO: 41.4 FL (ref 35.1–46.3)
EOSINOPHIL # BLD AUTO: 0.48 X10(3) UL (ref 0–0.7)
EOSINOPHIL NFR BLD AUTO: 6.8 %
ERYTHROCYTE [DISTWIDTH] IN BLOOD BY AUTOMATED COUNT: 11.9 % (ref 11–15)
HCT VFR BLD AUTO: 37.1 % (ref 39–53)
HGB BLD-MCNC: 12.1 G/DL (ref 13–17.5)
IMM GRANULOCYTES # BLD AUTO: 0.04 X10(3) UL (ref 0–1)
IMM GRANULOCYTES NFR BLD: 0.6 %
LYMPHOCYTES # BLD AUTO: 2.01 X10(3) UL (ref 1–4)
LYMPHOCYTES NFR BLD AUTO: 28.5 %
M PROTEIN MFR SERPL ELPH: 6.5 G/DL (ref 6.4–8.2)
MCH RBC QN AUTO: 31.2 PG (ref 26–34)
MCHC RBC AUTO-ENTMCNC: 32.6 G/DL (ref 31–37)
MCV RBC AUTO: 95.6 FL (ref 80–100)
MONOCYTES # BLD AUTO: 0.92 X10(3) UL (ref 0.1–1)
MONOCYTES NFR BLD AUTO: 13 %
NEUTROPHILS # BLD AUTO: 3.55 X10 (3) UL (ref 1.5–7.7)
NEUTROPHILS # BLD AUTO: 3.55 X10(3) UL (ref 1.5–7.7)
NEUTROPHILS NFR BLD AUTO: 50.4 %
PLATELET # BLD AUTO: 241 10(3)UL (ref 150–450)
RBC # BLD AUTO: 3.88 X10(6)UL (ref 4.3–5.7)
WBC # BLD AUTO: 7.1 X10(3) UL (ref 4–11)

## 2020-08-16 RX ORDER — LOPERAMIDE HYDROCHLORIDE 2 MG/1
2 CAPSULE ORAL 4 TIMES DAILY PRN
Status: DISCONTINUED | OUTPATIENT
Start: 2020-08-16 | End: 2020-08-17

## 2020-08-16 RX ORDER — ACETAMINOPHEN 325 MG/1
650 TABLET ORAL EVERY 6 HOURS PRN
Qty: 60 TABLET | Refills: 0 | Status: SHIPPED | OUTPATIENT
Start: 2020-08-16 | End: 2021-10-29 | Stop reason: ALTCHOICE

## 2020-08-16 RX ORDER — OXYCODONE HYDROCHLORIDE 5 MG/1
TABLET ORAL
Qty: 20 TABLET | Refills: 0 | Status: SHIPPED | OUTPATIENT
Start: 2020-08-16 | End: 2020-10-01 | Stop reason: ALTCHOICE

## 2020-08-16 RX ORDER — OXYCODONE HYDROCHLORIDE 15 MG/1
15 TABLET ORAL EVERY 6 HOURS PRN
Status: DISCONTINUED | OUTPATIENT
Start: 2020-08-16 | End: 2020-08-20

## 2020-08-16 RX ORDER — NALOXONE HYDROCHLORIDE 4 MG/.1ML
4 SPRAY, METERED NASAL AS NEEDED
Qty: 1 KIT | Refills: 0 | Status: SHIPPED | OUTPATIENT
Start: 2020-08-16 | End: 2021-02-09 | Stop reason: ALTCHOICE

## 2020-08-16 NOTE — PROGRESS NOTES
Acute Pain Service  POD # 4 s/p open cholecystectomy w/ cholangiography      Assessment  Rates abdominal/surgical pain 3-5/10 at rest which he states is manageable. Tolerating diet; denies nausea/emesis. + flatus, + BM.  Has not required IV pain medications

## 2020-08-16 NOTE — PROGRESS NOTES
[facility]  Progress Note    Gia Palma Patient Status:  Inpatient    1965 MRN [id number]   [facility] 3NW-A Attending Troy Dempsey MD   [facility] Day # 7 PCP Mehdi Lala MD         SUBJECTIVE:  Subjective:  Wilhemena Steve Ples 08/11/20  0841 08/12/20  0628 08/13/20  0549 08/14/20  0510 08/15/20  0602    137 134* 135* 136   K 4.4 4.1 3.8 4.1 3.5    105 100 103 98   CO2 30.0 30.0 33.0* 30.0 36.0*   BUN 8 9 9 8 8   CREATSERUM 1.04 1.05 1.09 1.02 1.02   CA 9.0 8.5 8.5 8. within normal limits. Visualized chest wall structures are normal.         CONCLUSION:  There is no evidence of active cardiopulmonary disease.     Dictated by (CST): Luiz Fong MD on 8/03/2020 at 11:29 AM     Finalized by (CST): Luiz Fong MD on 8/0 accessory splenule. KIDNEYS:  Normal anatomic positions. No hydronephrosis. There is new minimal stranding around the right kidney and also anterior para renal space. ADRENALS:  Not enlarged. AORTA:  Smooth tapering. RETROPERITONEUM:  No adenopathy.  MONA intravenous contrast material. Post contrast coronal MPR imaging was performed. Dose reduction techniques were used.  Dose information is transmitted to the [facility] of Radiology) Shyanne Kenny 35 ([address]) which includes the Dose Meds:     • enoxaparin  40 mg Subcutaneous Nightly   • famoTIDine  20 mg Oral BID   • piperacillin-tazobactam  4.5 g Intravenous Q8H   • divalproex Sodium ER  500 mg Oral Nightly   • lisinopril  10 mg Oral Daily   • Pravastatin Sodium  20 mg Oral N cholecystitis without obstruction    Hx laparoscopic cholecystectomy    H/O acute pancreatitis          Plan:  Continue present management,      Abdominal pain, acute  =====  CONCLUSION:       Findings most consistent with stones and inflammation involving outpatient     Asthma–Singulair     WEAKNESS- PT OT EVAL AND TREAT  DISPOSITION PER      See tests ordered,  Available and radiology reviewed  All consultant notes reviewed  Discussed with nursing on floor  dvt prophylaxis reviewed  PT and/or

## 2020-08-16 NOTE — PROGRESS NOTES
POD #5 s/p open cholecystectomy with cholangiogram    Afebrile and VSS  Tolerating low fat diet   Denies N/V  +flatus, +BM    /73 (BP Location: Left arm)   Pulse 78   Temp 97.7 °F (36.5 °C) (Oral)   Resp 16   Ht 1.753 m (5' 9\")   Wt 103.6 kg (228 lb

## 2020-08-16 NOTE — PROGRESS NOTES
[address] [facility]  TEL: [phone]  FAX: [phone]    [facility] Patient Status:  Inpatient    1965 MRN [id number]   [facility] 3NW-A Attending Kavya Garcia MD   [facility] Day # 7 PCP Etta Santizo 3.8 4.1 3.5  --     103 98  --    CO2 33.0* 30.0 36.0*  --    ALKPHO 59 55 59 55   AST 54* 42* 39* 41*   ALT 86* 70* 80* 74*   BILT 0.4 0.2 0.2 0.3   TP 6.8 6.0* 7.0 6.5           Microbiology    Reviewed in EMR,   Hospital Encounter on 08/08/20   1. cholecystectomy with intraoperative cholangiogram and lysis of adhesions 8/11, per op report--> necrosis and probable abscess formation, cx with strep anginosus. Bcx 8/9 negative. - cx noted. Has been well covered on zosyn  2.  Acute febrile illness due to

## 2020-08-16 NOTE — PROGRESS NOTES
Pt A/O x 4. Has c/o abdominal pain rated 4/10 characterized as aching pain. Pt is on scheduled oxycodone but has refused to take such medication and opted to take a non narcotic pain med. Pt did receive tylenol and has stated relief of pain.  No further epi

## 2020-08-16 NOTE — RESPIRATORY THERAPY NOTE
NATALYA : EQUIPMENT USE: DAILY SUMMARY                                            SET MODE:                                          USAGE IN HOURS:                                          90% EXP. PRESSURE(EPAP):

## 2020-08-17 ENCOUNTER — APPOINTMENT (OUTPATIENT)
Dept: CT IMAGING | Facility: HOSPITAL | Age: 55
DRG: 415 | End: 2020-08-17
Attending: INTERNAL MEDICINE
Payer: COMMERCIAL

## 2020-08-17 LAB
ALBUMIN SERPL-MCNC: 3 G/DL (ref 3.4–5)
ALBUMIN/GLOB SERPL: 0.6 {RATIO} (ref 1–2)
ALP LIVER SERPL-CCNC: 69 U/L (ref 45–117)
ALT SERPL-CCNC: 85 U/L (ref 16–61)
ANION GAP SERPL CALC-SCNC: 3 MMOL/L (ref 0–18)
AST SERPL-CCNC: 48 U/L (ref 15–37)
BASOPHILS # BLD AUTO: 0.04 X10(3) UL (ref 0–0.2)
BASOPHILS NFR BLD AUTO: 0.5 %
BILIRUB SERPL-MCNC: 0.3 MG/DL (ref 0.1–2)
BUN BLD-MCNC: 8 MG/DL (ref 7–18)
BUN/CREAT SERPL: 6.6 (ref 10–20)
CALCIUM BLD-MCNC: 9.4 MG/DL (ref 8.5–10.1)
CHLORIDE SERPL-SCNC: 104 MMOL/L (ref 98–112)
CO2 SERPL-SCNC: 30 MMOL/L (ref 21–32)
CREAT BLD-MCNC: 1.21 MG/DL (ref 0.7–1.3)
DEPRECATED RDW RBC AUTO: 40.3 FL (ref 35.1–46.3)
EOSINOPHIL # BLD AUTO: 0.21 X10(3) UL (ref 0–0.7)
EOSINOPHIL NFR BLD AUTO: 2.6 %
ERYTHROCYTE [DISTWIDTH] IN BLOOD BY AUTOMATED COUNT: 11.6 % (ref 11–15)
GLOBULIN PLAS-MCNC: 5 G/DL (ref 2.8–4.4)
GLUCOSE BLD-MCNC: 89 MG/DL (ref 70–99)
HCT VFR BLD AUTO: 42.7 % (ref 39–53)
HGB BLD-MCNC: 13.9 G/DL (ref 13–17.5)
IMM GRANULOCYTES # BLD AUTO: 0.04 X10(3) UL (ref 0–1)
IMM GRANULOCYTES NFR BLD: 0.5 %
LYMPHOCYTES # BLD AUTO: 2.02 X10(3) UL (ref 1–4)
LYMPHOCYTES NFR BLD AUTO: 25.3 %
M PROTEIN MFR SERPL ELPH: 8 G/DL (ref 6.4–8.2)
MCH RBC QN AUTO: 31.1 PG (ref 26–34)
MCHC RBC AUTO-ENTMCNC: 32.6 G/DL (ref 31–37)
MCV RBC AUTO: 95.5 FL (ref 80–100)
MONOCYTES # BLD AUTO: 0.82 X10(3) UL (ref 0.1–1)
MONOCYTES NFR BLD AUTO: 10.3 %
NEUTROPHILS # BLD AUTO: 4.84 X10 (3) UL (ref 1.5–7.7)
NEUTROPHILS # BLD AUTO: 4.84 X10(3) UL (ref 1.5–7.7)
NEUTROPHILS NFR BLD AUTO: 60.8 %
OSMOLALITY SERPL CALC.SUM OF ELEC: 282 MOSM/KG (ref 275–295)
PLATELET # BLD AUTO: 274 10(3)UL (ref 150–450)
POTASSIUM SERPL-SCNC: 4.2 MMOL/L (ref 3.5–5.1)
RBC # BLD AUTO: 4.47 X10(6)UL (ref 4.3–5.7)
SODIUM SERPL-SCNC: 137 MMOL/L (ref 136–145)
WBC # BLD AUTO: 8 X10(3) UL (ref 4–11)

## 2020-08-17 PROCEDURE — 74177 CT ABD & PELVIS W/CONTRAST: CPT | Performed by: INTERNAL MEDICINE

## 2020-08-17 NOTE — DIETARY NOTE
[facility]    NUTRITION ASSESSMENT    Pt does not meet malnutrition criteria.     NUTRITION DIAGNOSIS/PROBLEM:    Increased nutrient needs related to increased demands of healing as evidenced by recent open cholecystectomy    NUTRITION INTERVENTION: BID + Trial of Ensure     FOOD/NUTRITION RELATED HISTORY:  Appetite: Poor  Intake: %  Intake Meeting Needs: No, but supplements to maximize  Food Allergies: No  Cultural/Ethnic/Yazidism Preferences Addresses: Yes    NUTRITION RELATED PHYSICAL Michael Yeung

## 2020-08-17 NOTE — RESPIRATORY THERAPY NOTE
NATALYA - Equipment Use Daily Summary:  · Set Mode CPAP  · Usage in hours: 5.1  · 90% Pressure (EPAP) level: 6  · 90% Insp Pressure (IPAP):   · AHI: 0.2  · Supplemental Oxygen:   · Comments:

## 2020-08-17 NOTE — PLAN OF CARE
Assumed care 1900  Patient alert and oriented x4  Complains of pain in abdomen tylenol given  One dose imodium given for loose stools per patient request  IV antibiotics given  Afebrile  NSR/SB on tele  Reports night sweats

## 2020-08-17 NOTE — PLAN OF CARE
Assumed care at 0700  Pt AxOx4, SR on tele, RA during day and CPAP at night  Midline incision RAKEL, C/D/I  CORINNA drain removed by surg onc  C/o right side pain, improved w/ tylenol  BM today  IV abx given per [facility]  Voiding adequately  Plan for possible d/c home

## 2020-08-17 NOTE — PROGRESS NOTES
[facility]  Progress Note    Eliseo Lefort Patient Status:  Inpatient    1965 MRN [id number]   [facility] 3NW-A Attending Cedric Rojas MD   [facility] Day # 8 PCP Mardene Pallas, MD         SUBJECTIVE:  Subjective:  Bel Blanchard 12.9* 12.1*   MCV 94.0 95.0 96.9 92.7 95.6   .0 216.0 165.0 261.0 241.0       Recent Labs   Lab 08/11/20  0841 08/12/20  0628 08/13/20  0549 08/14/20  0510 08/15/20  0602    137 134* 135* 136   K 4.4 4.1 3.8 4.1 3.5    105 100 103 98   C midsternal chest pain. FINDINGS:  Lungs and pleural spaces are clear. Cardiomediastinal silhouette is within normal limits. Visualized chest wall structures are normal.         CONCLUSION:  There is no evidence of active cardiopulmonary disease.     Payal Stephenson appearance of dependent debris/material.  PANCREAS:  Uniform parenchyma. SPLEEN:  Not enlarged. Small accessory splenule. KIDNEYS:  Normal anatomic positions. No hydronephrosis.   There is new minimal stranding around the right kidney and also anterior pa TECHNIQUE:  CT scanning was performed from the dome of the diaphragm to the pubic symphysis with non-ionic intravenous contrast material. Post contrast coronal MPR imaging was performed. Dose reduction techniques were used.  Dose information is transmitted Reinaldo Garnett MD on 8/09/2020 at 8:27 AM     Finalized by (CST): Reinaldo Garnett MD on 8/09/2020 at 8:32 AM           Meds:     • enoxaparin  40 mg Subcutaneous Nightly   • famoTIDine  20 mg Oral BID   • piperacillin-tazobactam  4.5 g Intravenous Q8H   • div Problems:    Leukocytosis, unspecified type    Acute cholecystitis    Calculus of gallbladder with acute on chronic cholecystitis without obstruction    Hx laparoscopic cholecystectomy    H/O acute pancreatitis          Plan:  Continue present management, type headache on Depakote per neurology Dr. Lilly Baca as outpatient     Asthma–Singulair     WEAKNESS- PT OT EVAL AND TREAT  DISPOSITION PER      See tests ordered,  Available and radiology reviewed  All consultant notes reviewed  Discussed wit

## 2020-08-17 NOTE — PROGRESS NOTES
Pulmonary Progress Note        NAME: Sorin Paz - ROOM: 8556/2084-Y - MRN: [id number] - Age: 49year old - : 1965      SUBJECTIVE: \"My breathing is great! \" No cough.  Does feel a bit nauseated    OBJECTIVE:    Oxygen Therapy  SpO2: 97 %  O2 De 135* 136   K 3.8 4.1 3.5    103 98   CO2 33.0* 30.0 36.0*     Lab Results   Component Value Date    INR 0.96 08/03/2020         ASSESSMENT/PLAN:    1. Hypoxia: related to atelectasis and pain meds.  no sign of pneumonia or CHF.   Doubt PE  -resolved,

## 2020-08-17 NOTE — PROGRESS NOTES
[address] [facility]  TEL: [phone]  FAX: [phone]    Adrien Umana Patient Status:  Inpatient    1965 MRN [id number]   [facility] 3NW-A Attending Patrice Fermin MD   [facility] Day # 8 PCP Dotty Craig CREATSERUM 1.02 1.02  --  1.21   GFRAA 97 97  --  79   GFRNAA 84 84  --  68   CA 8.3* 8.9  --  9.4   ALB 2.2* 2.7* 2.6* 3.0*   * 136  --  137   K 4.1 3.5  --  4.2    98  --  104   CO2 30.0 36.0*  --  30.0   ALKPHO 55 59 55 69   AST 42* 39* 41 Hetal Pavon. Dario Epperson PA-C    ID ATTENDING ADDENDUM     Pt seen an examined independently. Chart reviewed. Agree with above. Note has been reviewed by me and modified as needed. Exam and Impression/ Recs as noted above.   Pt reported feeling \"h

## 2020-08-17 NOTE — PHYSICAL THERAPY NOTE
PHYSICAL THERAPY TREATMENT NOTE - INPATIENT    Room Number: 1257/6055-G     Session: 1   Number of Visits to Meet Established Goals: 2;3    Presenting Problem: abdominal pain     History related to current admission: 50 yo male with recent hx of cholecyst History  Past Surgical History:   Procedure Laterality Date   • APPENDECTOMY     • CHOLECYSTECTOMY  3/3/2008    with bile leak   • CHOLECYSTECTOMY POSSIBLE COMMON DUCT EXPLORATION N/A 8/11/2020    Performed by aJsmine Lance MD at [address] [place] [address] (AM-PAC Scale): 50.25   CMS Modifier (G-Code): CJ    FUNCTIONAL ABILITY STATUS  Gait Assessment   Gait Assistance: Supervision  Distance (ft): 200  Assistive Device: Rolling walker  Pattern: (increased trunk flexion, decreased gait speed)  Stoop/Curb [employer] training  Rehab Potential : Good  Frequency (Obs): 3-5x/week    CURRENT GOALS   Goal #1 Patient is able to demonstrate supine - sit EOB @ level: modified independent. Xu Vanegas MET 8/17      Goal #2 Patient is able to demonstrate transfers EOB to/from Chair/Wheelcha

## 2020-08-17 NOTE — PROGRESS NOTES
POD #6 s/p open cholecystectomy with cholangiogram    No acute events  Afebrile and VSS     /78 (BP Location: Left arm)   Pulse 79   Temp 98.8 °F (37.1 °C) (Oral)   Resp 18   Ht 1.753 m (5' 9\")   Wt 101 kg (222 lb 9.6 oz)   SpO2 97%   BMI 32.87 kg/m

## 2020-08-18 ENCOUNTER — MED REC SCAN ONLY (OUTPATIENT)
Dept: SURGERY | Facility: CLINIC | Age: 55
End: 2020-08-18

## 2020-08-18 LAB
ALBUMIN SERPL-MCNC: 2.7 G/DL (ref 3.4–5)
ALBUMIN/GLOB SERPL: 0.6 {RATIO} (ref 1–2)
ALP LIVER SERPL-CCNC: 57 U/L (ref 45–117)
ALT SERPL-CCNC: 73 U/L (ref 16–61)
ANION GAP SERPL CALC-SCNC: 5 MMOL/L (ref 0–18)
AST SERPL-CCNC: 33 U/L (ref 15–37)
BASOPHILS # BLD AUTO: 0.04 X10(3) UL (ref 0–0.2)
BASOPHILS NFR BLD AUTO: 0.5 %
BILIRUB SERPL-MCNC: 0.3 MG/DL (ref 0.1–2)
BUN BLD-MCNC: 11 MG/DL (ref 7–18)
BUN/CREAT SERPL: 9.8 (ref 10–20)
C DIFF TOX B STL QL: NEGATIVE
CALCIUM BLD-MCNC: 9 MG/DL (ref 8.5–10.1)
CHLORIDE SERPL-SCNC: 105 MMOL/L (ref 98–112)
CO2 SERPL-SCNC: 26 MMOL/L (ref 21–32)
CREAT BLD-MCNC: 1.12 MG/DL (ref 0.7–1.3)
DEPRECATED RDW RBC AUTO: 40.8 FL (ref 35.1–46.3)
EOSINOPHIL # BLD AUTO: 0.27 X10(3) UL (ref 0–0.7)
EOSINOPHIL NFR BLD AUTO: 3.2 %
ERYTHROCYTE [DISTWIDTH] IN BLOOD BY AUTOMATED COUNT: 11.8 % (ref 11–15)
GLOBULIN PLAS-MCNC: 4.5 G/DL (ref 2.8–4.4)
GLUCOSE BLD-MCNC: 104 MG/DL (ref 70–99)
HCT VFR BLD AUTO: 40.3 % (ref 39–53)
HGB BLD-MCNC: 13.3 G/DL (ref 13–17.5)
IMM GRANULOCYTES # BLD AUTO: 0.06 X10(3) UL (ref 0–1)
IMM GRANULOCYTES NFR BLD: 0.7 %
LYMPHOCYTES # BLD AUTO: 2.31 X10(3) UL (ref 1–4)
LYMPHOCYTES NFR BLD AUTO: 27.7 %
M PROTEIN MFR SERPL ELPH: 7.2 G/DL (ref 6.4–8.2)
MCH RBC QN AUTO: 31.5 PG (ref 26–34)
MCHC RBC AUTO-ENTMCNC: 33 G/DL (ref 31–37)
MCV RBC AUTO: 95.5 FL (ref 80–100)
MONOCYTES # BLD AUTO: 0.88 X10(3) UL (ref 0.1–1)
MONOCYTES NFR BLD AUTO: 10.6 %
NEUTROPHILS # BLD AUTO: 4.77 X10 (3) UL (ref 1.5–7.7)
NEUTROPHILS # BLD AUTO: 4.77 X10(3) UL (ref 1.5–7.7)
NEUTROPHILS NFR BLD AUTO: 57.3 %
OSMOLALITY SERPL CALC.SUM OF ELEC: 282 MOSM/KG (ref 275–295)
PLATELET # BLD AUTO: 273 10(3)UL (ref 150–450)
POTASSIUM SERPL-SCNC: 4 MMOL/L (ref 3.5–5.1)
RBC # BLD AUTO: 4.22 X10(6)UL (ref 4.3–5.7)
SODIUM SERPL-SCNC: 136 MMOL/L (ref 136–145)
WBC # BLD AUTO: 8.3 X10(3) UL (ref 4–11)

## 2020-08-18 RX ORDER — GARLIC EXTRACT 500 MG
1 CAPSULE ORAL DAILY
Status: DISCONTINUED | OUTPATIENT
Start: 2020-08-18 | End: 2020-08-20

## 2020-08-18 NOTE — PAYOR COMM NOTE
--------------  CONTINUED STAY REVIEW    Payor: Tu Agarwal  #:  [id number]  Authorization Number: [id number]    Admit date: 8/9/20  Admit time: [address]    Admitting Physician: Ivan Melendez MD  Attending Physician:  Yokasta Armas MD Continue low fat diet.  -Ambulate. -IS. -DVT/ulcer prophylaxis. -ID following for antibiotic, abdominal cultures with streptococcus anginosus. -CT abd/pelvis reviewed and discussed. Will discuss with IR for possible drain placement.   Martha Johnson PA-C Enoxaparin Sodium (LOVENOX) 40 MG/0.4ML injection 40 mg     Date Action Dose Route User    8/18/2020 0700 Given 40 mg Subcutaneous (Left Upper Abdomen) Hilda Felipe      famoTIDine (PEPCID) tab 20 mg     Date Action Dose Route User    8/18/2020 0831 G

## 2020-08-18 NOTE — PLAN OF CARE
Assumed care at 0700  Pt AxOx4, SR on tele, RA during day and CPAP at night  Midline incision RAKEL, C/D/I  Tmax 99.0. c/o chills  C/o right side pain, declines tylenol because he \"doesn't want to mask his fevers\"  C/o diarrhea since yesterday.  Stool colle

## 2020-08-18 NOTE — PROGRESS NOTES
[address] [facility]  TEL: [phone]  FAX: [phone]    [place] Ter[place] Patient Status:  Inpatient    1965 MRN [id number]   [facility] 3NW-A Attending Kate Nicole MD   [facility] Day # 9 MARYA Cox CREATSERUM 1.02  --  1.21 1.12   GFRAA 97  --  79 86   GFRNAA 84  --  68 75   CA 8.9  --  9.4 9.0   ALB 2.7* 2.6* 3.0* 2.7*     --  137 136   K 3.5  --  4.2 4.0   CL 98  --  104 105   CO2 36.0*  --  30.0 26.0   ALKPHO 59 55 69 57   AST 39* 41* 48* pain.    CONTRAST USED: 100cc of Omnipaque 350    FINDINGS:   LUNG BASE: There is discoid atelectasis in the right lower lobe which has developed and subsegmental atelectasis in the left lung base. Dunmore Pencil   LIVER: There is a small amount of free air in the right This is adjacent to surgical clips. Findings concerning for infection/developing   abscess. .     Small amount of free air in the right upper quadrant anteriorly consistent with recent surgery. .    There is wall thickening involving the hepatic flexure of c

## 2020-08-18 NOTE — PROGRESS NOTES
[facility]  Progress Note    Zachery Wright Patient Status:  Inpatient    1965 MRN [id number]   [facility] 3NW-A Attending Jacek Price MD   [facility] Day # 9 PCP Nicki Helton MD         SUBJECTIVE:  Subjective:   [facility] * 92 100* 76 89 104*       Recent Labs   Lab 08/14/20  0510 08/15/20  0602 08/16/20  0559 08/17/20  1323 08/18/20  0728   ALT 70* 80* 74* 85* 73*   AST 42* 39* 41* 48* 33   ALB 2.2* 2.7* 2.6* 3.0* 2.7*       Recent Labs   Lab 08/12/20  1822   PGLU (w) (cpt=71275/76726)    Result Date: 8/3/2020  PROCEDURE:  CTA CHEST + CT ABD (W) + CT PEL (W) (CPT=71275/88738)  COMPARISON:  [facility] , CT, CT ABDOMEN PELVIS IV CONTRAST, NO ORAL (ER), 5/12/2017, 4:55 PM.  [facility] , CT, CT ABDOMEN+PELVIS KIDNEYSTONE 2D inflammation. Surgically absent appendix. No free air. No ascites. ABDOMINAL WALL:  Small fat containing umbilical hernia. Right inguinal herniorrhaphy changes. URINARY BLADDER:  Nondistended. PELVIC NODES:  None enlarged.  PELVIC ORGANS:  No prostatome transcribed by Technologist)  Patient is here with right side back pain. CONTRAST USED:  100cc of Omnipaque 350  FINDINGS:  LIVER:  No enlargement, atrophy, abnormal density, or significant focal lesion.   BILIARY:  The patient is status post a cholecyste Montelukast Sodium  10 mg Oral Nightly   • Pramipexole Dihydrochloride  0.125 mg Oral TID     • sodium chloride 10 mL/hr at 08/12/20 1506     oxyCODONE HCl, morphINE sulfate **OR** morphINE sulfate **OR** morphINE sulfate, acetaminophen **OR** acetaminophe cholecystectomy. Symptomatic choledocholithiasis, gallbladder fossa inflammation–stump cholecystitis      OPERATIVE REPORT     PREOPERATIVE DIAGNOSIS:    1. Acute cholecystitis.     2.       Status post partial cholecystectomy.     POSTOPERATIVE DIAG underlying known coronary artery disease as discussed with the patient     Hypertension–lisinopril     Anxiety– clonazepam per PCP     Dyslipidemia–statin     GERD– omeprazole, Tums as needed     Vitamin D deficiency–low vitamin D     Rheumatoid arthritis–

## 2020-08-18 NOTE — CM/SW NOTE
SW received a call back form pt's spouse, and she declined [facility]. She states multiple family members in the home have autoimmune disease, and she does not want anyone in her home due to COVID risks.  Pt's spouse reports they have a R/W they will borrow from f

## 2020-08-18 NOTE — PLAN OF CARE
A/OX4, RA, VSS, SR per Tele  C/o R sided pain, refused pain medication at this time  CT abd/ pelvis complete, results relayed to ID and surg onc   IV abx infusing per order  Needs attended to, Will cont to monitor.

## 2020-08-18 NOTE — CM/SW NOTE
SW l/m for pt's spouse Diandra Munoz to discuss [facility] recommendation. Pt currently R/O COVID, so will make [facility] referrals via [facility] when COVID test results are known. SW to follow.

## 2020-08-18 NOTE — RESPIRATORY THERAPY NOTE
NATALYA Equipment Usage Summary :            Set Mode :AUTO CPAP           Usage in Hours:5;48          90% Pressure (EPAP) : 6           90% Insp Pressure (IPAP);           AHI : 1          Supplemental Oxygen LPM :2          Auto cpap ( MAX PRESSURE ) setting

## 2020-08-18 NOTE — PROGRESS NOTES
Ok to resume diet per Lonnie Hopkins. Explained pt. PO meds given with water. Temp 99.5 this AM, pt refusing APAP. Will continue to monitor for Temp. Addendum  C.diff neg.

## 2020-08-18 NOTE — PROGRESS NOTES
POD #7 s/p open cholecystectomy with cholangiogram    No acute events  Afebrile and VSS   CT scan performed yesterday   Still with RUQ pain     /83 (BP Location: Left arm)   Pulse 69   Temp 98.3 °F (36.8 °C) (Oral)   Resp 24   Ht 1.753 m (5' 9\")   W -ID following for antibiotic, abdominal cultures with streptococcus anginosus. -CT abd/pelvis reviewed and discussed. Will discuss with IR for possible drain placement.     Mary Lu PA-C  Department of Surgical Oncology  [facility]  [address]

## 2020-08-18 NOTE — PLAN OF CARE
Assumed care at 0730. Pt A&Ox4. VSS. RA.NSR. Up adlib. Anxious about POC and developing symptoms. CT abd showed developing abcess, no immediate intervention per Dr. Mervat Can. CDIF negative (-). Pending COVID-PCR per Dr. Michael Magdaleno.  Isolation initiated until resul strengthening/mobility  - Encourage toileting schedule  Outcome: Progressing     Problem: DISCHARGE PLANNING  Goal: Discharge to home or other facility with appropriate resources  Description  INTERVENTIONS:  - Identify barriers to discharge w/pt and careg of RW for transfers and ambulation   Outcome: Progressing

## 2020-08-19 LAB
BASOPHILS # BLD AUTO: 0.04 X10(3) UL (ref 0–0.2)
BASOPHILS NFR BLD AUTO: 0.4 %
DEPRECATED RDW RBC AUTO: 40.7 FL (ref 35.1–46.3)
EOSINOPHIL # BLD AUTO: 0.4 X10(3) UL (ref 0–0.7)
EOSINOPHIL NFR BLD AUTO: 3.9 %
ERYTHROCYTE [DISTWIDTH] IN BLOOD BY AUTOMATED COUNT: 11.9 % (ref 11–15)
HCT VFR BLD AUTO: 43.2 % (ref 39–53)
HGB BLD-MCNC: 14.5 G/DL (ref 13–17.5)
IMM GRANULOCYTES # BLD AUTO: 0.08 X10(3) UL (ref 0–1)
IMM GRANULOCYTES NFR BLD: 0.8 %
LYMPHOCYTES # BLD AUTO: 3.66 X10(3) UL (ref 1–4)
LYMPHOCYTES NFR BLD AUTO: 36 %
MCH RBC QN AUTO: 31.2 PG (ref 26–34)
MCHC RBC AUTO-ENTMCNC: 33.6 G/DL (ref 31–37)
MCV RBC AUTO: 92.9 FL (ref 80–100)
MONOCYTES # BLD AUTO: 1.13 X10(3) UL (ref 0.1–1)
MONOCYTES NFR BLD AUTO: 11.1 %
NEUTROPHILS # BLD AUTO: 4.85 X10 (3) UL (ref 1.5–7.7)
NEUTROPHILS # BLD AUTO: 4.85 X10(3) UL (ref 1.5–7.7)
NEUTROPHILS NFR BLD AUTO: 47.8 %
PLATELET # BLD AUTO: 312 10(3)UL (ref 150–450)
RBC # BLD AUTO: 4.65 X10(6)UL (ref 4.3–5.7)
WBC # BLD AUTO: 10.2 X10(3) UL (ref 4–11)

## 2020-08-19 RX ORDER — PANTOPRAZOLE SODIUM 40 MG/1
40 TABLET, DELAYED RELEASE ORAL
Status: DISCONTINUED | OUTPATIENT
Start: 2020-08-20 | End: 2020-08-20

## 2020-08-19 NOTE — PROGRESS NOTES
[address] [facility]  TEL: [phone]  FAX: [phone]    Yasmeen Bolus Patient Status:  Inpatient    1965 MRN [id number]   [facility] 3NW-A Attending Mame Dejesus MD   [facility] Day # 10 PCP Poly Mckay 79 86   GFRNAA 84  --  68 75   CA 8.9  --  9.4 9.0   ALB 2.7* 2.6* 3.0* 2.7*     --  137 136   K 3.5  --  4.2 4.0   CL 98  --  104 105   CO2 36.0*  --  30.0 26.0   ALKPHO 59 55 69 57   AST 39* 41* 48* 33   ALT 80* 74* 85* 73*   BILT 0.2 0.3 0.3 0.3 IR; will continue to monitor patient's symptoms.  If still with fevers and pain tomorrow- may need to repeat CT.  -follow temps and wbc  -pain control per pain service  -incentive spirometry  -monitor stool  -encourage ambulation   -will await SARS- CoV-2 M

## 2020-08-19 NOTE — RESPIRATORY THERAPY NOTE
NATALYA Equipment Usage Summary :from 8/18 afternoon to evening            Set Mode :AUTO CPAP           Usage in Hours:5;48          90% Pressure (EPAP) : 6           90% Insp Pressure (IPAP);           AHI : 1          Supplemental Oxygen LPM :2          Auto

## 2020-08-19 NOTE — PROGRESS NOTES
[facility] Gastroenterology Progress Note    S: Recalled about RUQ pain and RLQ pain. Pt denies nausea, vomiting at this time.  Eating     O: /74 (BP Location: Left arm)   Pulse 75   Temp 98.5 °F (36.9 °C) (Oral)   Resp 15   Ht 69\"   Wt 220 lb 3 with Dr. Yanira Santiago as OP with EGD as OP    GI will sign off at this time. Please call back with any questions or concerns.        Jhoana Buck, 08/19/20, 3:28 PM  [facility] Gastroenterology

## 2020-08-19 NOTE — PLAN OF CARE
A/OX4, RA/ 2L while sleeping, SR per Tele  C/o RUQ pain managed w/ prn tylenol and oxy  Iso to r/o covid maintained  Needs attended to, Will cont to monitor.

## 2020-08-19 NOTE — PROGRESS NOTES
[facility]  Progress Note    Radha Trevino Patient Status:  Inpatient    1965 MRN [id number]   [facility] 3NW-A Attending Deandre Charlton MD   [address] [facility] [address] Day # 10 PCP Radha De León MD         SUBJECTIVE:  Subjective:  Gay Ivey 98 104 105   CO2 33.0* 30.0 36.0* 30.0 26.0   BUN 9 8 8 8 11   CREATSERUM 1.09 1.02 1.02 1.21 1.12   CA 8.5 8.3* 8.9 9.4 9.0   MG 2.1 2.2 2.3  --   --    PHOS 3.8 4.0 3.9  --   --    GLU 92 100* 76 89 104*       Recent Labs   Lab 08/14/20  0510 08/15/20  0 disease.     Dictated by (CST): Oliver Alba MD on 8/03/2020 at 11:29 AM     Finalized by (CST): Oliver Alba MD on 8/03/2020 at 11:29 AM       Cta Chest + Ct Abd (w) + Ct Pel (w) Sh(cpt=71275/35257)    Result Date: 8/3/2020  PROCEDURE:  CTA CHEST + CT AB also anterior para renal space. ADRENALS:  Not enlarged. AORTA:  Smooth tapering. RETROPERITONEUM:  No adenopathy. BOWEL/MESENTERY:  Normal bowel caliber. No colonic inflammation. Surgically absent appendix. No free air. No ascites.  ABDOMINAL WALL:  Sm transmitted to the [facility] [place] [facility]) [address]) which includes the Dose Index Registry. PATIENT STATED HISTORY:(As transcribed by Technologist)  Patient is here with right side back pain.    CONTRAST USED:  10 piperacillin-tazobactam  4.5 g Intravenous Q8H   • divalproex Sodium ER  500 mg Oral Nightly   • lisinopril  10 mg Oral Daily   • Pravastatin Sodium  20 mg Oral Nightly   • melatonin  3 mg Oral Nightly   • Montelukast Sodium  10 mg Oral Nightly   • Pramipe pancreatitis          Plan:  Continue present management,      Abdominal pain, acute  =====  CONCLUSION:       Findings most consistent with stones and inflammation involving a dilated cystic duct remnant status post cholecystectomy.   Symptomatic choledoch patient received Definity contrast for better post-exercise imaging.  Within the limits of the image quality, no obvious exercise-induced wall motion abnormality was detected.    No current cardiac symptoms or any underlying known coronary artery disease a

## 2020-08-19 NOTE — PLAN OF CARE
Assumed patient care at 0730.  VSS   Isolation continued, awaiting PCR covid   Pain reported 7/10, declining oxy   IV zosyn continued   Afebrile   Possible repeaty CT tomorrow, or dc home   POC dicussed, will continue to monitor      Problem: Patient/Family

## 2020-08-20 VITALS
BODY MASS INDEX: 32.62 KG/M2 | OXYGEN SATURATION: 100 % | HEIGHT: 69 IN | DIASTOLIC BLOOD PRESSURE: 71 MMHG | RESPIRATION RATE: 18 BRPM | WEIGHT: 220.25 LBS | TEMPERATURE: 99 F | HEART RATE: 90 BPM | SYSTOLIC BLOOD PRESSURE: 121 MMHG

## 2020-08-20 LAB
ALBUMIN SERPL-MCNC: 3.1 G/DL (ref 3.4–5)
ALBUMIN/GLOB SERPL: 0.7 {RATIO} (ref 1–2)
ALP LIVER SERPL-CCNC: 62 U/L (ref 45–117)
ALT SERPL-CCNC: 68 U/L (ref 16–61)
ANION GAP SERPL CALC-SCNC: 7 MMOL/L (ref 0–18)
AST SERPL-CCNC: 32 U/L (ref 15–37)
BASOPHILS # BLD AUTO: 0.03 X10(3) UL (ref 0–0.2)
BASOPHILS NFR BLD AUTO: 0.3 %
BILIRUB SERPL-MCNC: 0.4 MG/DL (ref 0.1–2)
BUN BLD-MCNC: 12 MG/DL (ref 7–18)
BUN/CREAT SERPL: 10.1 (ref 10–20)
CALCIUM BLD-MCNC: 9 MG/DL (ref 8.5–10.1)
CHLORIDE SERPL-SCNC: 103 MMOL/L (ref 98–112)
CO2 SERPL-SCNC: 24 MMOL/L (ref 21–32)
CREAT BLD-MCNC: 1.19 MG/DL (ref 0.7–1.3)
CRP SERPL-MCNC: 1.77 MG/DL (ref ?–0.3)
DEPRECATED RDW RBC AUTO: 41.3 FL (ref 35.1–46.3)
EOSINOPHIL # BLD AUTO: 0.49 X10(3) UL (ref 0–0.7)
EOSINOPHIL NFR BLD AUTO: 5 %
ERYTHROCYTE [DISTWIDTH] IN BLOOD BY AUTOMATED COUNT: 11.9 % (ref 11–15)
GLOBULIN PLAS-MCNC: 4.6 G/DL (ref 2.8–4.4)
GLUCOSE BLD-MCNC: 141 MG/DL (ref 70–99)
HCT VFR BLD AUTO: 44.8 % (ref 39–53)
HGB BLD-MCNC: 14.7 G/DL (ref 13–17.5)
IMM GRANULOCYTES # BLD AUTO: 0.06 X10(3) UL (ref 0–1)
IMM GRANULOCYTES NFR BLD: 0.6 %
LYMPHOCYTES # BLD AUTO: 2.97 X10(3) UL (ref 1–4)
LYMPHOCYTES NFR BLD AUTO: 30.5 %
M PROTEIN MFR SERPL ELPH: 7.7 G/DL (ref 6.4–8.2)
MCH RBC QN AUTO: 31.5 PG (ref 26–34)
MCHC RBC AUTO-ENTMCNC: 32.8 G/DL (ref 31–37)
MCV RBC AUTO: 96.1 FL (ref 80–100)
MONOCYTES # BLD AUTO: 0.95 X10(3) UL (ref 0.1–1)
MONOCYTES NFR BLD AUTO: 9.8 %
NEUTROPHILS # BLD AUTO: 5.23 X10 (3) UL (ref 1.5–7.7)
NEUTROPHILS # BLD AUTO: 5.23 X10(3) UL (ref 1.5–7.7)
NEUTROPHILS NFR BLD AUTO: 53.8 %
OSMOLALITY SERPL CALC.SUM OF ELEC: 280 MOSM/KG (ref 275–295)
PLATELET # BLD AUTO: 355 10(3)UL (ref 150–450)
POTASSIUM SERPL-SCNC: 3.9 MMOL/L (ref 3.5–5.1)
RBC # BLD AUTO: 4.66 X10(6)UL (ref 4.3–5.7)
SARS-COV-2 RNA RESP QL NAA+PROBE: NOT DETECTED
SODIUM SERPL-SCNC: 134 MMOL/L (ref 136–145)
WBC # BLD AUTO: 9.7 X10(3) UL (ref 4–11)

## 2020-08-20 RX ORDER — PANTOPRAZOLE SODIUM 40 MG/1
40 TABLET, DELAYED RELEASE ORAL
Qty: 30 TABLET | Refills: 0 | Status: SHIPPED | OUTPATIENT
Start: 2020-08-21

## 2020-08-20 RX ORDER — CEFADROXIL 500 MG/1
500 CAPSULE ORAL 2 TIMES DAILY
Qty: 14 CAPSULE | Refills: 0 | Status: SHIPPED
Start: 2020-08-20 | End: 2020-08-27

## 2020-08-20 RX ORDER — METRONIDAZOLE 500 MG/1
500 TABLET ORAL 3 TIMES DAILY
Qty: 21 TABLET | Refills: 0 | Status: SHIPPED
Start: 2020-08-20 | End: 2020-08-27

## 2020-08-20 NOTE — PAYOR COMM NOTE
--------------  CONTINUED STAY REVIEW    Payor: Tu Agarwal  #:  [id number]  Authorization Number: [id number]    Admit date: 8/9/20  Admit time: [address]    Admitting Physician: Deandre Charlton MD  Attending Physician:  Sean Lawrence MD No swelling or deformity noted. Integument: No lesions. See above. ASSESSMENT:     1. Gallbladder fossa inflammation- stump cholecystitis.  S/p open cholecystectomy with intraoperative cholangiogram and lysis of adhesions 8/11, per op report--> necrosi MEDICATIONS ADMINISTERED IN LAST 1 DAY:  Acidophilus/Pectin (PROBIOTIC) CAPS 1 capsule     Date Action Dose Route User    8/20/2020 0848 Given 1 capsule Oral Makenna Hardin RN    8/19/2020 7310 Given 1 capsule Oral Sterling Pinedo RN 0.125 mg Oral Benjamin Ramos RN      Pravastatin Sodium (PRAVACHOL) tab 20 mg     Date Action Dose Route User    8/19/2020 9733 Given 20 mg Oral Grayson, Artemio Kwan RN

## 2020-08-20 NOTE — PLAN OF CARE
Assumed care at 0730  Patient alert and oriented x4  IV antibiotics   Complaints of 5/10 abdomen pain. Denies need for medication  COVID PCR (-)  AVS reviewed with patient. All questions answered.   Antibiotic prescriptions faxed to [address] [place] [address]

## 2020-08-20 NOTE — PLAN OF CARE
Assumed care at [facility]. AOx4. C/o incision pain 7/10. Pt refused to take any pain meds. Klonopin given for anxiety. Still awaiting for PCR. Continue on IV Zosyn. Plan of care discussed with pt. Call light within reach.

## 2020-08-20 NOTE — PROGRESS NOTES
[address] [facility]  TEL: [phone]  FAX: [phone]    Best Keyes Patient Status:  Inpatient    1965 MRN [id number]   [facility] 3NW-A Attending Andra Castro MD   [facility] Day # 11 MARYA Bermudez  105 103   CO2 30.0 26.0 24.0   ALKPHO 69 57 62   AST 48* 33 32   ALT 85* 73* 68*   BILT 0.3 0.3 0.4   TP 8.0 7.2 7.7           Microbiology    Reviewed in EMR,   Hospital Encounter on 08/08/20   1.  BODY FLUID CULT,AEROBIC AND ANAEROBIC     Status: atelectasis. covid neg  8. Diarrhea. C. Diff negative.  Assume due to colonic inflammation seen in CT     PLAN:    - since no more fevers and he is better clinically, with nl WBC and CRP minimally elevated, will not recheck CT at this point  -ok for home on

## 2020-08-20 NOTE — PROGRESS NOTES
[facility]  Progress Note    Brenda Guallpa Patient Status:  Inpatient    1965 MRN [id number]   [facility] 3NW-A Attending Wayne Ruiz MD   [facility] Day # 6 PCP Farida Ritchie MD         SUBJECTIVE:  Subjective:  Don Coon 4.0 3.9  --   --   --    * 76 89 104* 141*       Recent Labs   Lab 08/15/20  0602 08/16/20  0559 08/17/20  1323 08/18/20  0728 08/20/20  0632   ALT 80* 74* 85* 73* 68*   AST 39* 41* 48* 33 32   ALB 2.7* 2.6* 3.0* 2.7* 3.1*       No results for input Chest + Ct Abd (w) + Ct Pel (w) (cpt=71275/53574)    Result Date: 8/3/2020  PROCEDURE:  CTA CHEST + CT ABD (W) + CT PEL (W) (CPT=71275/00745)  COMPARISON:  [facility] , CT, CT ABDOMEN PELVIS IV CONTRAST, NO ORAL (ER), 5/12/2017, 4:55 PM.  [facility] , CT, CT A caliber. No colonic inflammation. Surgically absent appendix. No free air. No ascites. ABDOMINAL WALL:  Small fat containing umbilical hernia. Right inguinal herniorrhaphy changes. URINARY BLADDER:  Nondistended. PELVIC NODES:  None enlarged.  PELVIC O STATED HISTORY:(As transcribed by Technologist)  Patient is here with right side back pain. CONTRAST USED:  100cc of Omnipaque 350  FINDINGS:  LIVER:  No enlargement, atrophy, abnormal density, or significant focal lesion.   BILIARY:  The patient is statu Pravastatin Sodium  20 mg Oral Nightly   • melatonin  3 mg Oral Nightly   • Montelukast Sodium  10 mg Oral Nightly   • Pramipexole Dihydrochloride  0.125 mg Oral TID     • sodium chloride 10 mL/hr at 08/12/20 1506     oxyCODONE HCl, morphINE sulfate **OR** stones and inflammation involving a dilated cystic duct remnant status post cholecystectomy. Symptomatic choledocholithiasis, gallbladder fossa inflammation–stump cholecystitis      OPERATIVE REPORT     PREOPERATIVE DIAGNOSIS:    1.        Acute cholecysti motion abnormality was detected.    No current cardiac symptoms or any underlying known coronary artery disease as discussed with the patient     Hypertension–lisinopril     Anxiety– clonazepam per PCP     Dyslipidemia–statin     GERD– Duodenitis–PPI     V

## 2020-08-20 NOTE — PROGRESS NOTES
POD #8 s/p open cholecystectomy with cholangiogram    No acute events. Afebrile and VSS. Patient reports having a good night. No feelings of fever/chills. COVID PCR negative.     /63 (BP Location: Left arm)   Pulse 87   Temp 98 °F (36.7 °C) (Oral) -Continue low fat diet.  -Ambulate. -IS. -DVT/ulcer prophylaxis. -ID following for antibiotic, abdominal cultures with streptococcus anginosus.  Timing of follow-up CT per ID.  -Otherwise okay for discharge from surg onc standpoint    Elyse Pandya PA-C

## 2020-08-21 ENCOUNTER — TELEPHONE (OUTPATIENT)
Dept: SURGERY | Facility: CLINIC | Age: 55
End: 2020-08-21

## 2020-08-21 ENCOUNTER — PATIENT OUTREACH (OUTPATIENT)
Dept: CASE MANAGEMENT | Age: 55
End: 2020-08-21

## 2020-08-21 NOTE — TELEPHONE ENCOUNTER
Called to check on pt since discharge. He is doing well. C/o mild fatigue. No fevers, chills, nausea, vomiting. One episode of loose stool this am but has otherwise been having normal BMs. Tolerating diet without difficulty.  Pain is controlled with tylenol

## 2020-08-21 NOTE — PAYOR COMM NOTE
--------------  DISCHARGE REVIEW    Payor: Tu Agarwal  #:  [id number]  Authorization Number: [id number]    Admit date: 8/9/20  Admit time:  0311  Discharge Date: 8/20/2020  3:08 PM     Admitting Physician: Qiana Portillo MD  Att[place]

## 2020-08-22 NOTE — DISCHARGE SUMMARY
[facility]  Discharge Summary    Georgiana Babb Patient Status:  Inpatient    1965 MRN [id number]   [facility] ([place], [place]) 7NE-A Attending No att. providers found   Hosp Day # 11 PCP Fiona Henriquez MD     Date of Admission: 2020    Da doing well up until approximately a couple weeks ago he developed abdominal pain and chest pain prompting him to present to the ER on 8/3/2020. Cardiac work-up at this time was negative.  The patient subsequently developed worsening back pain radiating to h R-0    PRAMIPEXOLE DIHYDROCHLORIDE 0.125 MG Oral Tab  TAKE 1 TABLET(0.125 MG) BY MOUTH THREE TIMES DAILY, Normal, Disp-90 tablet, R-3    DIVALPROEX SODIUM  MG Oral Tablet 24 Hr  TAKE 2 TABLETS BY MOUTH EVERY NIGHT AT BEDTIME, Normal, Disp-60 tablet,

## 2020-08-24 NOTE — PROGRESS NOTES
Initial Post Discharge Follow Up   Discharge Date: 8/20/20  Contact Date: 8/21/2020    Consent Verification:  Assessment Completed With: Patient  HIPAA Verified?   Yes    Discharge Dx:    Abdominal pain, Acute cholecystitis, S/P open cholecystectomy w/IO hospital?   o On a scale of 1-5   1- Very Poor and 5- Very well   o Very well  • When you were leaving the hospital were your discharge instructions reviewed with you? yes  • How well were your discharge instructions explained to you?   o On a scale of 1-5 3   • DIVALPROEX SODIUM  MG Oral Tablet 24 Hr TAKE 2 TABLETS BY MOUTH EVERY NIGHT AT BEDTIME 60 tablet 5   • omeprazole 20 MG Oral Capsule Delayed Release Take 1 capsule (20 mg total) by mouth 2 (two) times daily before meals.  Take one capsule (20 mg having any concerns with constipation? No    Referrals/orders at D/C:  Home Health/Services ordered at D/C? No     DME ordered at D/C? No     Needs post D/C:   Now that you are home, are there any needs or concerns you need addressed before your next visit questions or needs, he states he will.     CCM referral placed:  Not Applicable    BOOK BY DATE: 9/3/2020    [x]  Discharge Summary, Discharge medications reviewed/discussed/and reconciled against outpatient medications with patient, and orders reviewed and

## 2020-08-26 ENCOUNTER — OFFICE VISIT (OUTPATIENT)
Dept: SURGERY | Facility: CLINIC | Age: 55
End: 2020-08-26
Payer: COMMERCIAL

## 2020-08-26 VITALS
WEIGHT: 220.38 LBS | DIASTOLIC BLOOD PRESSURE: 77 MMHG | SYSTOLIC BLOOD PRESSURE: 124 MMHG | OXYGEN SATURATION: 95 % | HEART RATE: 91 BPM | BODY MASS INDEX: 33 KG/M2 | RESPIRATION RATE: 16 BRPM

## 2020-08-26 DIAGNOSIS — K81.0 ACUTE CHOLECYSTITIS: Primary | ICD-10-CM

## 2020-08-26 PROBLEM — R10.9 ABDOMINAL PAIN, ACUTE: Status: RESOLVED | Noted: 2020-08-09 | Resolved: 2020-08-26

## 2020-08-26 PROCEDURE — 99024 POSTOP FOLLOW-UP VISIT: CPT | Performed by: SURGERY

## 2020-08-26 PROCEDURE — 3074F SYST BP LT 130 MM HG: CPT | Performed by: SURGERY

## 2020-08-26 PROCEDURE — 3078F DIAST BP <80 MM HG: CPT | Performed by: SURGERY

## 2020-08-26 NOTE — PROGRESS NOTES
[facility]        Patient Name:  Georgiana Babb   YOB: 1965   Gender:  Male   Appt Date:  8/26/2020   Provider:  Zen Newman MD     PATIENT PROVIDERS  Primary Care Provider:Ariadna Olvera MD   Address: [address] The patient reports undergoing cholecystectomy in March 2008 by Dr. Cassandra Osman at [facility]. He reports this was complicated by a bile ducts leak, pancreatitis, and sepsis resulting in a prolonged hospital stay.  He had been doing well up until approximately a c •  PRAMIPEXOLE DIHYDROCHLORIDE 0.125 MG Oral Tab, TAKE 1 TABLET(0.125 MG) BY MOUTH THREE TIMES DAILY, Disp: 90 tablet, Rfl: 3  •  DIVALPROEX SODIUM  MG Oral Tablet 24 Hr, TAKE 2 TABLETS BY MOUTH EVERY NIGHT AT BEDTIME, Disp: 60 tablet, Rfl: 5  •  ome • Abdominal pain    • Acute cholecystitis 2008   • ALLERGIC RHINITIS    • ANXIETY    • Anxiety    • Arthritis    • Asthma    • Back pain    • Back problem    • Belching    • Bloating    • Bronchitis, chronic (HCC)    • Chest pain on exertion    • Constipat Tobacco comment: chewed tobacco from ages [age]    Alcohol use:  Yes      Alcohol/week: 4.0 standard drinks      Types: 4 Standard drinks or equivalent per week      Comment: occasional, no hx of excessive use    Drug use: No     Reviewed:  Family Histo -Multiple intraluminal gallstones. Procedure(s):  Staples were removed. Assessment / Plan:  (K81.0) Acute cholecystitis      Status post completion cholecystectomy. Doing well postoperatively. Staples were removed. Instructions discussed.   Cedrick Meng

## 2021-05-14 ENCOUNTER — OFFICE VISIT (OUTPATIENT)
Dept: NEUROLOGY | Facility: CLINIC | Age: 56
End: 2021-05-14
Payer: COMMERCIAL

## 2021-05-14 VITALS
RESPIRATION RATE: 16 BRPM | BODY MASS INDEX: 35 KG/M2 | DIASTOLIC BLOOD PRESSURE: 68 MMHG | WEIGHT: 240 LBS | HEART RATE: 74 BPM | SYSTOLIC BLOOD PRESSURE: 122 MMHG

## 2021-05-14 DIAGNOSIS — G25.0 ESSENTIAL TREMOR: ICD-10-CM

## 2021-05-14 DIAGNOSIS — G25.81 RLS (RESTLESS LEGS SYNDROME): ICD-10-CM

## 2021-05-14 DIAGNOSIS — R41.3 MEMORY LOSS: Primary | ICD-10-CM

## 2021-05-14 DIAGNOSIS — G43.009 MIGRAINE WITHOUT AURA AND WITHOUT STATUS MIGRAINOSUS, NOT INTRACTABLE: ICD-10-CM

## 2021-05-14 PROCEDURE — 3078F DIAST BP <80 MM HG: CPT | Performed by: OTHER

## 2021-05-14 PROCEDURE — 99205 OFFICE O/P NEW HI 60 MIN: CPT | Performed by: OTHER

## 2021-05-14 PROCEDURE — 3074F SYST BP LT 130 MM HG: CPT | Performed by: OTHER

## 2021-05-14 RX ORDER — PRAMIPEXOLE DIHYDROCHLORIDE 0.25 MG/1
0.25 TABLET ORAL 3 TIMES DAILY
Qty: 90 TABLET | Refills: 1 | Status: SHIPPED | OUTPATIENT
Start: 2021-05-14 | End: 2021-07-16

## 2021-05-14 RX ORDER — GABAPENTIN 300 MG/1
CAPSULE ORAL
Qty: 30 CAPSULE | Refills: 1 | Status: SHIPPED | OUTPATIENT
Start: 2021-05-14 | End: 2021-06-29

## 2021-05-14 RX ORDER — GABAPENTIN 300 MG/1
300 CAPSULE ORAL 3 TIMES DAILY
Qty: 30 CAPSULE | Refills: 1 | Status: SHIPPED | OUTPATIENT
Start: 2021-05-14 | End: 2021-05-14

## 2021-05-14 NOTE — PROGRESS NOTES
Patient reports headaches (a few years), tremors in hands ( a few years), RLS ( a few years) and memory loss over the past year.

## 2021-05-14 NOTE — H&P
Neurology H&P    Myna Custard Patient Status:  No patient class for patient encounter    1965 MRN [id number]   Location [address] [place] [address], [address] [place] [address], [address] [place] [address] Attending No att. providers found   Hosp Day # 0 PCP Srinivas Laguna MD • acetaminophen 325 MG Oral Tab Take 2 tablets (650 mg total) by mouth every 6 (six) hours as needed. 60 tablet 0   • ondansetron 4 MG Oral Tablet Dispersible Take 1 tablet (4 mg total) by mouth every 4 (four) hours as needed for Nausea.  10 tablet 0   • therapy     Mild persistent asthma without complication     Leukocytosis, unspecified type     Acute cholecystitis     Calculus of gallbladder with acute on chronic cholecystitis without obstruction     Acute cholecystitis     H/O acute pancreatitis      P sx   • OTHER SURGICAL HISTORY      tubes for bile drainage   • REMOVAL GALLBLADDER     • RENAL ENDOSCOPY     • REPAIR ING HERNIA,5+Y/O,REDUCIBL     • REVISE ULNAR NERVE AT ELBOW      bilateral. 1987 1989       SocHx:  Social History    Tobacco Use      Smo resp. rate 16, weight 240 lb (108.9 kg).     Gen: Awake and in no apparent distress  HEENT: moist mucus membranes  Neck: Supple  Cardiovascular: Regular rate and rhythm, no murmur  Pulm: CTAB  GI: non-tender, normal bowel sounds  Skin: normal, dry  Extremit has asthma. He has tried Depakote and Topamax in the past and reports that these had SEs or did not help. We also discussed Mysoline but he would prefer to avoid barbiturates. We can watch this for now.  Previous neurology notes reviewed today as well as pa

## 2021-06-28 DIAGNOSIS — G25.81 RLS (RESTLESS LEGS SYNDROME): ICD-10-CM

## 2021-06-28 DIAGNOSIS — G25.0 ESSENTIAL TREMOR: ICD-10-CM

## 2021-06-28 DIAGNOSIS — G43.009 MIGRAINE WITHOUT AURA AND WITHOUT STATUS MIGRAINOSUS, NOT INTRACTABLE: ICD-10-CM

## 2021-06-28 DIAGNOSIS — R41.3 MEMORY LOSS: ICD-10-CM

## 2021-06-28 NOTE — TELEPHONE ENCOUNTER
Spoke with patient who states he thought he was supposed to take Gabapentin 300mg tid. States he is out of medication.     Medication: Gabapentin    Date of last refill: 5/14/21 (#30/1)  [place]     Last office visit: 5/1

## 2021-06-29 RX ORDER — GABAPENTIN 300 MG/1
CAPSULE ORAL
Qty: 30 CAPSULE | Refills: 1 | Status: SHIPPED | OUTPATIENT
Start: 2021-06-29 | End: 2021-08-17

## 2021-07-15 DIAGNOSIS — G25.0 ESSENTIAL TREMOR: Primary | ICD-10-CM

## 2021-07-16 ENCOUNTER — TELEPHONE (OUTPATIENT)
Dept: NEUROLOGY | Facility: CLINIC | Age: 56
End: 2021-07-16

## 2021-07-16 RX ORDER — PRAMIPEXOLE DIHYDROCHLORIDE 0.25 MG/1
TABLET ORAL
Qty: 90 TABLET | Refills: 0 | Status: SHIPPED | OUTPATIENT
Start: 2021-07-16 | End: 2021-08-20

## 2021-08-09 RX ORDER — SULFASALAZINE 500 MG/1
TABLET ORAL
Qty: 60 TABLET | Refills: 0 | Status: SHIPPED | OUTPATIENT
Start: 2021-08-09 | End: 2021-08-14

## 2021-08-14 RX ORDER — SULFASALAZINE 500 MG/1
TABLET ORAL
Qty: 60 TABLET | Refills: 0 | Status: SHIPPED | OUTPATIENT
Start: 2021-08-14 | End: 2021-10-15

## 2021-08-17 ENCOUNTER — OFFICE VISIT (OUTPATIENT)
Dept: NEUROLOGY | Facility: CLINIC | Age: 56
End: 2021-08-17
Payer: COMMERCIAL

## 2021-08-17 VITALS
SYSTOLIC BLOOD PRESSURE: 128 MMHG | DIASTOLIC BLOOD PRESSURE: 70 MMHG | BODY MASS INDEX: 35 KG/M2 | WEIGHT: 240.19 LBS | RESPIRATION RATE: 16 BRPM | HEART RATE: 74 BPM

## 2021-08-17 DIAGNOSIS — R41.3 MEMORY LOSS: ICD-10-CM

## 2021-08-17 DIAGNOSIS — G43.009 MIGRAINE WITHOUT AURA AND WITHOUT STATUS MIGRAINOSUS, NOT INTRACTABLE: ICD-10-CM

## 2021-08-17 DIAGNOSIS — G25.81 RLS (RESTLESS LEGS SYNDROME): Primary | ICD-10-CM

## 2021-08-17 DIAGNOSIS — G25.0 ESSENTIAL TREMOR: ICD-10-CM

## 2021-08-17 PROCEDURE — 3074F SYST BP LT 130 MM HG: CPT | Performed by: OTHER

## 2021-08-17 PROCEDURE — 3078F DIAST BP <80 MM HG: CPT | Performed by: OTHER

## 2021-08-17 PROCEDURE — 99214 OFFICE O/P EST MOD 30 MIN: CPT | Performed by: OTHER

## 2021-08-17 RX ORDER — GABAPENTIN 300 MG/1
300 CAPSULE ORAL NIGHTLY
Qty: 180 CAPSULE | Refills: 0 | Status: SHIPPED | OUTPATIENT
Start: 2021-08-17 | End: 2021-10-29 | Stop reason: ALTCHOICE

## 2021-08-17 NOTE — PROGRESS NOTES
Neurology H&P    Kitty Fleischer Patient Status:  No patient class for patient encounter    1965 MRN [id number]   Location [address] [facility], [address] [place] [address], [address] [place] [address] Attending No att. providers found   Hosp Day # 0 PCP Caryn Martinez MD less frequent headaches.     Current Medications:  Current Outpatient Medications   Medication Sig Dispense Refill   • PRAMIPEXOLE DIHYDROCHLORIDE 0.25 MG Oral Tab TAKE 1 TABLET(0.25 MG) BY MOUTH THREE TIMES DAILY 90 tablet 0   • gabapentin 300 MG Oral Cap Status post right inguinal hernia repair, follow-up exam     Weakness     Right-sided face pain     Asthma exacerbation     Respiratory syncytial virus (RSV)     Essential hypertension     At risk for falling     Polycythemia     Unexplained night sweats for supplies   • Sleep apnea    • Sputum production    • Stress    • Uncomfortable fullness after meals    • Vitamin D deficiency    • Vomiting    • Wears glasses    • Weight gain        PSHx:  Past Surgical History:   Procedure Laterality Date   • APPENDE Paternal Grandfather         Alzheimers.    • Psychiatric Sister         Depression   • Obesity Sister    • Alcohol and Other Disorders Associated Brother    • Seizure Disorder Brother    • Psychiatric Brother         Depression   • Other (Other) Brother the neurology clinic for concerns of memory loss, frequent headache RLS and tremors.  He reports that the increased dose of pramipexole and gabapentin did not help with his RLS/PLMD symptoms and in fact these may be worsening as he can feel them during the

## 2021-08-20 DIAGNOSIS — G25.0 ESSENTIAL TREMOR: ICD-10-CM

## 2021-08-20 RX ORDER — PRAMIPEXOLE DIHYDROCHLORIDE 0.25 MG/1
TABLET ORAL
Qty: 90 TABLET | Refills: 2 | Status: SHIPPED | OUTPATIENT
Start: 2021-08-20 | End: 2021-11-26

## 2021-08-20 NOTE — TELEPHONE ENCOUNTER
Medication: PRAMIPEXOLE DIHYDROCHLORIDE 0.25 MG Oral Tab    Date of last refill: 07/16/2021 (#90/0)  [place]    Last office visit: 08/17/2021  Due back to clinic per last office note:  Around 11/17/2021  Date next of

## 2021-09-07 ENCOUNTER — TELEPHONE (OUTPATIENT)
Dept: BEHAVIORAL HEALTH | Age: 56
End: 2021-09-07

## 2021-10-07 NOTE — TELEPHONE ENCOUNTER
Medication: PRAMIPEXOLE DIHYDROCHLORIDE 0.25 MG Oral Tab    Date of last refill: 05/14/2021 (#90/1)  [place]    Last office visit: 05/14/2021  Due back to clinic per last office note:  Around 06/25/2021  Date next of TID     5.  Intermittent numbness sin hands and feet  - We can consider EMG in the future [de-identified] : 46M w hx of perianal abscess and trans-sphincteric fistula in 2014 s/p I+D, seton placement and fistulotomy, referred now by PCP for colon cancer screening. Patient overall feels well. States that bowel movement are usually normal but he has occasional constipation. Has infrequently noticed very small amts of blood with wiping after bowel movements. Usually when constipated. No associated rectal pain/itching/fullness. Denies abd pain, n/v, fevers, chills, weight loss.\par \par  In terms of  prior anal abscess and fistula, states that was only prior episode. No prior dx of IBD. No prior egd/colonoscopy. \par \par No fhx of GI malignancy or IBD. \par \par No cardiopulmonary disease. \par Not on a/c. \par

## 2021-10-15 RX ORDER — SULFASALAZINE 500 MG/1
TABLET ORAL
Qty: 60 TABLET | Refills: 0 | Status: SHIPPED | OUTPATIENT
Start: 2021-10-15

## 2021-10-28 NOTE — H&P
HPI:     Kellie Walker is a 48year old male with a PMH of anxiety, HTN, HL, GERD, NATALYA (has CPAP), hemorrhoids, OA. Saw Neurology recently for RLS, strong fam h/o Alzheimers, frequent HA.   He presents as a consult from Dr Shakira Noel office with ED, low unspecified    • Dizziness    • Elevated liver function tests    • Esophageal reflux    • Fatigue    • Flatulence/gas pain/belching    • GERD    • HEMORRHOIDS    • Hemorrhoids    • High blood pressure    • High cholesterol    • History of depression    • H Cholesterol Mother    • Heart Surgery Mother         PCI   • Heart Disease Mother    • Psychiatric Mother         Depression   • Other (Other) Mother    • Stroke Paternal Grandmother         cerebral aneurysm   • Neurological Disorder Paternal Grandfather (ALLEGRA) 180 MG Oral Tab Take 180 mg by mouth daily. • Montelukast Sodium (SINGULAIR) 10 MG Oral Tab Take 10 mg by mouth nightly. • Cholecalciferol (VITAMIN D3) 2000 UNITS Oral Tab Take 1 tablet by mouth daily.      • melatonin 3 MG Oral Tab Take 3 my best to keep you informed of all significant urological developments.       Nithya Kaplan MD, [facility]  Urologist  [facility]  Office: [phone]

## 2021-11-04 ENCOUNTER — HOSPITAL ENCOUNTER (OUTPATIENT)
Dept: NUCLEAR MEDICINE | Facility: HOSPITAL | Age: 56
End: 2021-11-04
Attending: INTERNAL MEDICINE
Payer: COMMERCIAL

## 2021-11-08 ENCOUNTER — OFFICE VISIT (OUTPATIENT)
Dept: SURGERY | Facility: CLINIC | Age: 56
End: 2021-11-08
Payer: COMMERCIAL

## 2021-11-08 DIAGNOSIS — N52.9 ERECTILE DYSFUNCTION, UNSPECIFIED ERECTILE DYSFUNCTION TYPE: Primary | ICD-10-CM

## 2021-11-08 DIAGNOSIS — Z12.5 SCREENING PSA (PROSTATE SPECIFIC ANTIGEN): ICD-10-CM

## 2021-11-08 PROCEDURE — 99204 OFFICE O/P NEW MOD 45 MIN: CPT | Performed by: UROLOGY

## 2021-11-08 RX ORDER — SILDENAFIL 100 MG/1
100 TABLET, FILM COATED ORAL
Qty: 30 TABLET | Refills: 5 | Status: SHIPPED | OUTPATIENT
Start: 2021-11-08

## 2021-11-08 NOTE — PATIENT INSTRUCTIONS
Post-Void Dribbling    Why do I leak after voiding? A more common question than many people realize. Post-Void Dribbling (PVD or more formally Post-Micturition Dribbling) occurs when you leak urine after urinating.  Basically you sit down to empty your antonio urethra is completely empty  This technique can be practiced at home. When in public toilets it can be done discreetly, with a hand inside a trouser pocket. It only takes a few seconds and may avoid the problem of wet trousers.     3. Double Voiding  If you

## 2021-11-10 ENCOUNTER — HOSPITAL ENCOUNTER (OUTPATIENT)
Dept: NUCLEAR MEDICINE | Facility: HOSPITAL | Age: 56
Discharge: HOME | End: 2021-11-10
Attending: INTERNAL MEDICINE
Payer: COMMERCIAL

## 2021-11-10 DIAGNOSIS — E03.9 HYPOTHYROID: ICD-10-CM

## 2021-11-10 PROCEDURE — 78014 THYROID IMAGING W/BLOOD FLOW: CPT | Performed by: INTERNAL MEDICINE

## 2021-11-25 DIAGNOSIS — G25.0 ESSENTIAL TREMOR: ICD-10-CM

## 2021-11-26 RX ORDER — PRAMIPEXOLE DIHYDROCHLORIDE 0.25 MG/1
TABLET ORAL
Qty: 90 TABLET | Refills: 2 | Status: SHIPPED | OUTPATIENT
Start: 2021-11-26 | End: 2022-01-27

## 2021-11-30 ENCOUNTER — BEHAVIORAL HEALTH (OUTPATIENT)
Dept: BEHAVIORAL HEALTH | Age: 56
End: 2021-11-30

## 2021-11-30 DIAGNOSIS — F43.22 ADJUSTMENT DISORDER WITH ANXIOUS MOOD: Primary | ICD-10-CM

## 2021-11-30 PROCEDURE — 90791 PSYCH DIAGNOSTIC EVALUATION: CPT | Performed by: SOCIAL WORKER

## 2022-01-27 DIAGNOSIS — G25.0 ESSENTIAL TREMOR: ICD-10-CM

## 2022-01-27 RX ORDER — PRAMIPEXOLE DIHYDROCHLORIDE 0.25 MG/1
TABLET ORAL
Qty: 90 TABLET | Refills: 0 | Status: SHIPPED | OUTPATIENT
Start: 2022-01-27

## 2022-01-27 NOTE — TELEPHONE ENCOUNTER
Follow up appt needed for further refills. Sent patient message via [employer].      Medication: PRAMIPEXOLE DIHYDROCHLORIDE 0.25 MG Oral Tab     Date of last refill: 11/26/21 (#90/0)  [place]     Last office visit: 8/17 family allowed to ask questions and all questions answered to the best of my ability.        Keke Orellana, DO  Neurology

## 2022-05-16 RX ORDER — PRAMIPEXOLE DIHYDROCHLORIDE 0.25 MG/1
TABLET ORAL
Qty: 90 TABLET | Refills: 0 | Status: SHIPPED | OUTPATIENT
Start: 2022-05-16

## 2022-06-07 ENCOUNTER — OFFICE VISIT (OUTPATIENT)
Dept: DERMATOLOGY | Age: 57
End: 2022-06-07

## 2022-06-07 DIAGNOSIS — L82.0 SEBORRHEIC KERATOSIS, INFLAMED: Primary | ICD-10-CM

## 2022-06-07 PROCEDURE — 17110 DESTRUCTION B9 LES UP TO 14: CPT | Performed by: DERMATOLOGY

## 2022-06-07 PROCEDURE — 99203 OFFICE O/P NEW LOW 30 MIN: CPT | Performed by: DERMATOLOGY

## 2022-06-15 DIAGNOSIS — G25.0 ESSENTIAL TREMOR: ICD-10-CM

## 2022-06-15 RX ORDER — PRAMIPEXOLE DIHYDROCHLORIDE 0.25 MG/1
TABLET ORAL
Qty: 90 TABLET | Refills: 0 | Status: SHIPPED | OUTPATIENT
Start: 2022-06-15

## 2022-06-16 ENCOUNTER — PATIENT MESSAGE (OUTPATIENT)
Dept: NEUROLOGY | Facility: CLINIC | Age: 57
End: 2022-06-16

## 2022-06-17 NOTE — TELEPHONE ENCOUNTER
From: Media Young  Sent: 6/16/2022 4:45 PM CDT  To: Alba James Nurse  Subject: Refill Request    [address],    I have tried calling [phone] for a few days now but cannot get through. Is there a better number?     [place]

## 2022-06-17 NOTE — TELEPHONE ENCOUNTER
From: Lenin Machado  To: Amado Constantino DO  Sent: 6/16/2022 4:41 PM CDT  Subject: Prescription Refill    Hello,    I was told that Pramipexole refill request was denied by my pharmacy. They told me to contact Dr. Jerome Choi. I have tried for 3 days and have put on hold by the automated service for over 20 minutes each time before I finally hung up. [phone] is the only number I see on the internet. ..but no one answers. Is there a better number? Thanks!     Erik Albert

## 2022-06-17 NOTE — TELEPHONE ENCOUNTER
[employer] message sent ot patient that Rx Pramipexole was sent to [facility] on 6/15 and message left on VM that he needs to schedule a f/u lynette't prior to future refills.

## 2022-07-13 DIAGNOSIS — G25.0 ESSENTIAL TREMOR: ICD-10-CM

## 2022-07-13 RX ORDER — PRAMIPEXOLE DIHYDROCHLORIDE 0.25 MG/1
TABLET ORAL
Qty: 90 TABLET | Refills: 0 | Status: SHIPPED | OUTPATIENT
Start: 2022-07-13

## 2022-08-09 DIAGNOSIS — G25.0 ESSENTIAL TREMOR: ICD-10-CM

## 2022-08-09 RX ORDER — PRAMIPEXOLE DIHYDROCHLORIDE 0.25 MG/1
TABLET ORAL
Qty: 90 TABLET | Refills: 2 | Status: SHIPPED | OUTPATIENT
Start: 2022-08-09

## 2022-10-31 ENCOUNTER — OFFICE VISIT (OUTPATIENT)
Dept: NEUROLOGY | Facility: CLINIC | Age: 57
End: 2022-10-31
Payer: COMMERCIAL

## 2022-10-31 VITALS
HEART RATE: 74 BPM | WEIGHT: 230 LBS | SYSTOLIC BLOOD PRESSURE: 128 MMHG | DIASTOLIC BLOOD PRESSURE: 74 MMHG | BODY MASS INDEX: 34 KG/M2 | RESPIRATION RATE: 14 BRPM

## 2022-10-31 DIAGNOSIS — G25.0 ESSENTIAL TREMOR: ICD-10-CM

## 2022-10-31 DIAGNOSIS — R41.3 MEMORY LOSS: ICD-10-CM

## 2022-10-31 DIAGNOSIS — G25.81 RLS (RESTLESS LEGS SYNDROME): Primary | ICD-10-CM

## 2022-10-31 PROCEDURE — 3074F SYST BP LT 130 MM HG: CPT | Performed by: OTHER

## 2022-10-31 PROCEDURE — 99213 OFFICE O/P EST LOW 20 MIN: CPT | Performed by: OTHER

## 2022-10-31 PROCEDURE — 3078F DIAST BP <80 MM HG: CPT | Performed by: OTHER

## 2022-10-31 RX ORDER — PRAMIPEXOLE DIHYDROCHLORIDE 0.25 MG/1
0.25 TABLET ORAL 3 TIMES DAILY
Qty: 270 TABLET | Refills: 1 | Status: SHIPPED | OUTPATIENT
Start: 2022-10-31

## 2023-01-04 ENCOUNTER — WALK IN (OUTPATIENT)
Dept: URGENT CARE | Age: 58
End: 2023-01-04

## 2023-01-04 VITALS
DIASTOLIC BLOOD PRESSURE: 64 MMHG | HEART RATE: 74 BPM | OXYGEN SATURATION: 97 % | SYSTOLIC BLOOD PRESSURE: 116 MMHG | TEMPERATURE: 98.2 F | RESPIRATION RATE: 16 BRPM

## 2023-01-04 DIAGNOSIS — J32.9 SINUSITIS, UNSPECIFIED CHRONICITY, UNSPECIFIED LOCATION: Primary | ICD-10-CM

## 2023-01-04 DIAGNOSIS — J45.21 MILD INTERMITTENT ASTHMA WITH ACUTE EXACERBATION: ICD-10-CM

## 2023-01-04 DIAGNOSIS — R05.9 COUGH, UNSPECIFIED TYPE: ICD-10-CM

## 2023-01-04 PROBLEM — G43.009 MIGRAINE WITHOUT AURA AND WITHOUT STATUS MIGRAINOSUS, NOT INTRACTABLE: Status: ACTIVE | Noted: 2021-05-14

## 2023-01-04 PROBLEM — G25.0 ESSENTIAL TREMOR: Status: ACTIVE | Noted: 2021-05-14

## 2023-01-04 PROBLEM — M87.052 AVASCULAR NECROSIS OF BONE OF HIP, LEFT (CMD): Status: ACTIVE | Noted: 2018-04-02

## 2023-01-04 PROBLEM — M05.79 RHEUMATOID ARTHRITIS INVOLVING MULTIPLE SITES WITH POSITIVE RHEUMATOID FACTOR (CMD): Status: ACTIVE | Noted: 2018-03-28

## 2023-01-04 PROBLEM — G25.81 RLS (RESTLESS LEGS SYNDROME): Status: ACTIVE | Noted: 2021-05-14

## 2023-01-04 PROBLEM — R41.3 MEMORY LOSS: Status: ACTIVE | Noted: 2021-05-14

## 2023-01-04 PROBLEM — Z87.19 H/O ACUTE PANCREATITIS: Status: ACTIVE | Noted: 2023-01-04

## 2023-01-04 PROBLEM — J45.30 MILD PERSISTENT ASTHMA WITHOUT COMPLICATION: Status: ACTIVE | Noted: 2019-01-24

## 2023-01-04 LAB
FLUAV AG UPPER RESP QL IA.RAPID: NEGATIVE
FLUBV AG UPPER RESP QL IA.RAPID: NEGATIVE
SARS-COV+SARS-COV-2 AG RESP QL IA.RAPID: NOT DETECTED
TEST LOT EXPIRATION DATE: NORMAL
TEST LOT NUMBER: NORMAL

## 2023-01-04 PROCEDURE — 3078F DIAST BP <80 MM HG: CPT | Performed by: FAMILY MEDICINE

## 2023-01-04 PROCEDURE — 87428 SARSCOV & INF VIR A&B AG IA: CPT | Performed by: FAMILY MEDICINE

## 2023-01-04 PROCEDURE — 99214 OFFICE O/P EST MOD 30 MIN: CPT | Performed by: FAMILY MEDICINE

## 2023-01-04 PROCEDURE — 3074F SYST BP LT 130 MM HG: CPT | Performed by: FAMILY MEDICINE

## 2023-01-04 RX ORDER — PREDNISONE 50 MG/1
50 TABLET ORAL DAILY
Qty: 5 TABLET | Refills: 0 | Status: SHIPPED | OUTPATIENT
Start: 2023-01-04 | End: 2023-01-09

## 2023-01-04 RX ORDER — AZITHROMYCIN 250 MG/1
TABLET, FILM COATED ORAL
Qty: 6 TABLET | Refills: 0 | Status: SHIPPED | OUTPATIENT
Start: 2023-01-04

## 2023-01-04 RX ORDER — PRAMIPEXOLE DIHYDROCHLORIDE 0.12 MG/1
0.12 TABLET ORAL 3 TIMES DAILY
COMMUNITY
End: 2023-01-09 | Stop reason: ALTCHOICE

## 2023-01-04 RX ORDER — ALBUTEROL SULFATE 2.5 MG/3ML
SOLUTION RESPIRATORY (INHALATION)
Qty: 75 ML | Refills: 1 | Status: SHIPPED | OUTPATIENT
Start: 2023-01-04

## 2023-01-04 RX ORDER — MONTELUKAST SODIUM 10 MG/1
10 TABLET ORAL NIGHTLY
COMMUNITY

## 2023-01-04 RX ORDER — PANTOPRAZOLE SODIUM 20 MG/1
20 TABLET, DELAYED RELEASE ORAL DAILY
COMMUNITY

## 2023-01-04 ASSESSMENT — PAIN SCALES - GENERAL: PAINLEVEL: 4

## 2023-01-09 ENCOUNTER — WALK IN (OUTPATIENT)
Dept: URGENT CARE | Age: 58
End: 2023-01-09

## 2023-01-09 VITALS
SYSTOLIC BLOOD PRESSURE: 112 MMHG | OXYGEN SATURATION: 97 % | DIASTOLIC BLOOD PRESSURE: 58 MMHG | TEMPERATURE: 98.4 F | RESPIRATION RATE: 18 BRPM | HEART RATE: 75 BPM

## 2023-01-09 DIAGNOSIS — J40 BRONCHITIS: Primary | ICD-10-CM

## 2023-01-09 PROCEDURE — 3074F SYST BP LT 130 MM HG: CPT | Performed by: INTERNAL MEDICINE

## 2023-01-09 PROCEDURE — 99214 OFFICE O/P EST MOD 30 MIN: CPT | Performed by: INTERNAL MEDICINE

## 2023-01-09 PROCEDURE — 3078F DIAST BP <80 MM HG: CPT | Performed by: INTERNAL MEDICINE

## 2023-01-09 RX ORDER — LOVASTATIN 40 MG/1
40 TABLET ORAL NIGHTLY
COMMUNITY
Start: 2022-12-30

## 2023-01-09 RX ORDER — LISINOPRIL 10 MG/1
10 TABLET ORAL DAILY
COMMUNITY
Start: 2022-12-22

## 2023-01-09 RX ORDER — MONTELUKAST SODIUM 10 MG/1
10 TABLET ORAL NIGHTLY
COMMUNITY

## 2023-01-09 RX ORDER — LOPERAMIDE HYDROCHLORIDE 2 MG/1
2 CAPSULE ORAL
COMMUNITY

## 2023-01-09 RX ORDER — PREDNISONE 20 MG/1
TABLET ORAL
Qty: 18 TABLET | Refills: 0 | Status: SHIPPED | OUTPATIENT
Start: 2023-01-09 | End: 2023-01-18

## 2023-01-09 RX ORDER — ALBUTEROL SULFATE 90 UG/1
AEROSOL, METERED RESPIRATORY (INHALATION)
COMMUNITY
Start: 2023-01-08

## 2023-01-09 RX ORDER — PRAMIPEXOLE DIHYDROCHLORIDE 0.25 MG/1
TABLET ORAL
COMMUNITY
Start: 2022-12-09

## 2023-01-09 RX ORDER — GARLIC EXTRACT 500 MG
1 CAPSULE ORAL DAILY
COMMUNITY

## 2023-01-09 RX ORDER — CYCLOBENZAPRINE HCL 10 MG
10 TABLET ORAL 3 TIMES DAILY PRN
Qty: 15 TABLET | Refills: 0 | Status: SHIPPED | OUTPATIENT
Start: 2023-01-09

## 2023-01-09 RX ORDER — FEXOFENADINE HCL 180 MG/1
180 TABLET ORAL DAILY
COMMUNITY

## 2023-01-09 ASSESSMENT — PAIN SCALES - GENERAL: PAINLEVEL: 4

## 2023-01-23 ENCOUNTER — HOSPITAL ENCOUNTER (EMERGENCY)
Facility: HOSPITAL | Age: 58
Discharge: HOME | End: 2023-01-23
Attending: EMERGENCY MEDICINE
Payer: COMMERCIAL

## 2023-01-23 ENCOUNTER — APPOINTMENT (OUTPATIENT)
Dept: GENERAL RADIOLOGY | Facility: HOSPITAL | Age: 58
End: 2023-01-23
Attending: EMERGENCY MEDICINE
Payer: COMMERCIAL

## 2023-01-23 ENCOUNTER — APPOINTMENT (OUTPATIENT)
Dept: CT IMAGING | Facility: HOSPITAL | Age: 58
End: 2023-01-23
Attending: EMERGENCY MEDICINE
Payer: COMMERCIAL

## 2023-01-23 VITALS
TEMPERATURE: 97 F | OXYGEN SATURATION: 95 % | SYSTOLIC BLOOD PRESSURE: 121 MMHG | RESPIRATION RATE: 18 BRPM | DIASTOLIC BLOOD PRESSURE: 80 MMHG | HEART RATE: 108 BPM

## 2023-01-23 DIAGNOSIS — K46.9 ABDOMINAL HERNIA WITHOUT OBSTRUCTION AND WITHOUT GANGRENE, RECURRENCE NOT SPECIFIED, UNSPECIFIED HERNIA TYPE: ICD-10-CM

## 2023-01-23 DIAGNOSIS — R10.9 ABDOMINAL WALL PAIN: Primary | ICD-10-CM

## 2023-01-23 LAB
ALBUMIN SERPL-MCNC: 4 G/DL (ref 3.4–5)
ALBUMIN/GLOB SERPL: 1.2 {RATIO} (ref 1–2)
ALP LIVER SERPL-CCNC: 73 U/L
ALT SERPL-CCNC: 51 U/L
ANION GAP SERPL CALC-SCNC: 5 MMOL/L (ref 0–18)
AST SERPL-CCNC: 36 U/L (ref 15–37)
BASOPHILS # BLD AUTO: 0.02 X10(3) UL (ref 0–0.2)
BASOPHILS NFR BLD AUTO: 0.2 %
BILIRUB SERPL-MCNC: 0.9 MG/DL (ref 0.1–2)
BILIRUB UR QL STRIP.AUTO: NEGATIVE
BUN BLD-MCNC: 16 MG/DL (ref 7–18)
CALCIUM BLD-MCNC: 9.2 MG/DL (ref 8.5–10.1)
CHLORIDE SERPL-SCNC: 108 MMOL/L (ref 98–112)
CLARITY UR REFRACT.AUTO: CLEAR
CO2 SERPL-SCNC: 26 MMOL/L (ref 21–32)
COLOR UR AUTO: YELLOW
CREAT BLD-MCNC: 1.15 MG/DL
EOSINOPHIL # BLD AUTO: 0.26 X10(3) UL (ref 0–0.7)
EOSINOPHIL NFR BLD AUTO: 3 %
ERYTHROCYTE [DISTWIDTH] IN BLOOD BY AUTOMATED COUNT: 11.7 %
GFR SERPLBLD BASED ON 1.73 SQ M-ARVRAT: 75 ML/MIN/1.73M2 (ref 60–?)
GLOBULIN PLAS-MCNC: 3.3 G/DL (ref 2.8–4.4)
GLUCOSE BLD-MCNC: 93 MG/DL (ref 70–99)
GLUCOSE UR STRIP.AUTO-MCNC: NEGATIVE MG/DL
HCT VFR BLD AUTO: 48.4 %
HGB BLD-MCNC: 16.9 G/DL
IMM GRANULOCYTES # BLD AUTO: 0.01 X10(3) UL (ref 0–1)
IMM GRANULOCYTES NFR BLD: 0.1 %
KETONES UR STRIP.AUTO-MCNC: NEGATIVE MG/DL
LACTATE SERPL-SCNC: 1 MMOL/L (ref 0.4–2)
LEUKOCYTE ESTERASE UR QL STRIP.AUTO: NEGATIVE
LIPASE SERPL-CCNC: 261 U/L (ref 73–393)
LYMPHOCYTES # BLD AUTO: 2.62 X10(3) UL (ref 1–4)
LYMPHOCYTES NFR BLD AUTO: 29.9 %
MCH RBC QN AUTO: 32.8 PG (ref 26–34)
MCHC RBC AUTO-ENTMCNC: 34.9 G/DL (ref 31–37)
MCV RBC AUTO: 94 FL
MONOCYTES # BLD AUTO: 1.19 X10(3) UL (ref 0.1–1)
MONOCYTES NFR BLD AUTO: 13.6 %
NEUTROPHILS # BLD AUTO: 4.66 X10 (3) UL (ref 1.5–7.7)
NEUTROPHILS # BLD AUTO: 4.66 X10(3) UL (ref 1.5–7.7)
NEUTROPHILS NFR BLD AUTO: 53.2 %
NITRITE UR QL STRIP.AUTO: NEGATIVE
OSMOLALITY SERPL CALC.SUM OF ELEC: 289 MOSM/KG (ref 275–295)
PH UR STRIP.AUTO: 5 [PH] (ref 5–8)
PLATELET # BLD AUTO: 178 10(3)UL (ref 150–450)
POTASSIUM SERPL-SCNC: 4.5 MMOL/L (ref 3.5–5.1)
PROCALCITONIN SERPL-MCNC: <0.05 NG/ML (ref ?–0.16)
PROT SERPL-MCNC: 7.3 G/DL (ref 6.4–8.2)
PROT UR STRIP.AUTO-MCNC: NEGATIVE MG/DL
RBC # BLD AUTO: 5.15 X10(6)UL
RBC UR QL AUTO: NEGATIVE
SARS-COV-2 RNA RESP QL NAA+PROBE: NOT DETECTED
SODIUM SERPL-SCNC: 139 MMOL/L (ref 136–145)
SP GR UR STRIP.AUTO: 1.02 (ref 1–1.03)
UROBILINOGEN UR STRIP.AUTO-MCNC: <2 MG/DL
WBC # BLD AUTO: 8.8 X10(3) UL (ref 4–11)

## 2023-01-23 PROCEDURE — 96360 HYDRATION IV INFUSION INIT: CPT

## 2023-01-23 PROCEDURE — 85025 COMPLETE CBC W/AUTO DIFF WBC: CPT | Performed by: EMERGENCY MEDICINE

## 2023-01-23 PROCEDURE — 84145 PROCALCITONIN (PCT): CPT | Performed by: EMERGENCY MEDICINE

## 2023-01-23 PROCEDURE — 83690 ASSAY OF LIPASE: CPT | Performed by: EMERGENCY MEDICINE

## 2023-01-23 PROCEDURE — 83605 ASSAY OF LACTIC ACID: CPT | Performed by: EMERGENCY MEDICINE

## 2023-01-23 PROCEDURE — 96361 HYDRATE IV INFUSION ADD-ON: CPT

## 2023-01-23 PROCEDURE — 80053 COMPREHEN METABOLIC PANEL: CPT | Performed by: EMERGENCY MEDICINE

## 2023-01-23 PROCEDURE — 71045 X-RAY EXAM CHEST 1 VIEW: CPT | Performed by: EMERGENCY MEDICINE

## 2023-01-23 PROCEDURE — 99284 EMERGENCY DEPT VISIT MOD MDM: CPT

## 2023-01-23 PROCEDURE — 99285 EMERGENCY DEPT VISIT HI MDM: CPT

## 2023-01-23 PROCEDURE — 81003 URINALYSIS AUTO W/O SCOPE: CPT | Performed by: EMERGENCY MEDICINE

## 2023-01-23 PROCEDURE — 74177 CT ABD & PELVIS W/CONTRAST: CPT | Performed by: EMERGENCY MEDICINE

## 2023-01-23 RX ORDER — SODIUM CHLORIDE 9 MG/ML
125 INJECTION, SOLUTION INTRAVENOUS CONTINUOUS
Status: DISCONTINUED | OUTPATIENT
Start: 2023-01-23 | End: 2023-01-23

## 2023-01-23 NOTE — ED INITIAL ASSESSMENT (HPI)
A&Ox3 ambulatory patient p/w abdominal pain     Patient reports having GB removed in 2008 \"but they didn't get it all out\", had another surgery in 2020 to remove remnants and new stones. Patient reports 3-4 weeks of intermittent fevers, cough, and worsening RLQ pain \"similar to how I felt in 2020\".  States pain radiates straight through to back    Denies any cp/sob/n/v/urinary sx, +diarrhea    Patient has been taking tylenol and ibuprofen daily for fevers    RR even/NL

## 2023-05-22 DIAGNOSIS — G25.0 ESSENTIAL TREMOR: ICD-10-CM

## 2023-05-22 NOTE — TELEPHONE ENCOUNTER
Pt has been scheduled for first avail f/u in [facility], 10/23/2023 and is on waitlist.

## 2023-05-22 NOTE — TELEPHONE ENCOUNTER
MLTCB on VM (ok per HIPAA consent)- Needs to schedule a follow up lynette't before refill can be processed. Medication: Pramipexole     Date of last refill: 10/31/2022 (#270/1)  [place]      Last office visit: 10/31/2022  Due back to clinic per last office note:  6 months  Date next office visit scheduled:    No future appointments. Last OV note recommendation:    1. Memory loss  - Neuropsychology referral given once again for cognitive assessment      2.  RLS  - He stopped gabapentin on his own   - Pramipexole to 0.25mg TID

## 2023-05-23 RX ORDER — PRAMIPEXOLE DIHYDROCHLORIDE 0.25 MG/1
0.25 TABLET ORAL 3 TIMES DAILY
Qty: 270 TABLET | Refills: 1 | Status: SHIPPED | OUTPATIENT
Start: 2023-05-23

## 2023-10-23 ENCOUNTER — OFFICE VISIT (OUTPATIENT)
Dept: NEUROLOGY | Facility: CLINIC | Age: 58
End: 2023-10-23
Payer: COMMERCIAL

## 2023-10-23 VITALS
WEIGHT: 245 LBS | SYSTOLIC BLOOD PRESSURE: 132 MMHG | BODY MASS INDEX: 36 KG/M2 | OXYGEN SATURATION: 97 % | RESPIRATION RATE: 16 BRPM | HEART RATE: 86 BPM | DIASTOLIC BLOOD PRESSURE: 76 MMHG

## 2023-10-23 DIAGNOSIS — G25.81 RLS (RESTLESS LEGS SYNDROME): Primary | ICD-10-CM

## 2023-10-23 DIAGNOSIS — G25.0 ESSENTIAL TREMOR: ICD-10-CM

## 2023-10-23 PROCEDURE — 99213 OFFICE O/P EST LOW 20 MIN: CPT | Performed by: OTHER

## 2023-10-23 PROCEDURE — 3075F SYST BP GE 130 - 139MM HG: CPT | Performed by: OTHER

## 2023-10-23 PROCEDURE — 3078F DIAST BP <80 MM HG: CPT | Performed by: OTHER

## 2023-10-23 RX ORDER — PRAMIPEXOLE DIHYDROCHLORIDE 0.25 MG/1
0.25 TABLET ORAL 3 TIMES DAILY
Qty: 270 TABLET | Refills: 1 | Status: SHIPPED | OUTPATIENT
Start: 2023-10-23

## 2023-10-23 NOTE — PROGRESS NOTES
Patient states no changes in memory. Denies increase symptoms of RLS. Patient states a shocking sensation on the left side of the neck. Patient states this occurs about 3-4 times a week. Patient stated this started about a year ago. Denies HA, numbness or tingling sensation. Denies difficulty with speech.
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
## 2024-01-28 NOTE — ED PROVIDER NOTES
Patient Seen in: [facility] Emergency Department    History   Patient presents with:  Headache (neurologic)    Stated Complaint: headache    HPI    27-year-old male comes to the hospital complaint of having difficulty with headaches that have been o fullness after meals    • Vitamin D deficiency    • Vomiting    • Wears glasses    • Weight gain        Past Surgical History:   Procedure Laterality Date   • APPENDECTOMY     • CHOLECYSTECTOMY  3/3/2008    with bile leak   • COLONOSCOPY  1/15/2009    hemo Full range of motion noted without tenderness  Neuro: No focal deficits noted    ED Course   Labs Reviewed - No data to display       Mri Brain Mra Head+mra Neck (all W+wo) (cpt=[id number])    Result Date: 1/7/2019  PROCEDURE:  MRI BRAIN MRA HEAD+MR communicating artery, with associated small right P1 segment. The superior cerebellar, AICA and PICA segments are patent. CONCLUSION:  1. No acute intracranial abnormality. 2.  Unremarkable [place] MRA. 3.  Unremarkable neck MRA.     Denisse Hall Alert and oriented to person, place and time

## 2024-04-04 ENCOUNTER — HOSPITAL ENCOUNTER (OUTPATIENT)
Dept: CT IMAGING | Age: 59
Discharge: HOME | End: 2024-04-04
Attending: INTERNAL MEDICINE
Payer: COMMERCIAL

## 2024-04-04 DIAGNOSIS — R10.32 ABDOMINAL PAIN, LLQ: ICD-10-CM

## 2024-04-04 DIAGNOSIS — K43.9 VENTRAL HERNIA: ICD-10-CM

## 2024-04-04 LAB
CREAT BLD-MCNC: 1.3 MG/DL
EGFRCR SERPLBLD CKD-EPI 2021: 64 ML/MIN/1.73M2 (ref 60–?)

## 2024-04-04 PROCEDURE — 74177 CT ABD & PELVIS W/CONTRAST: CPT | Performed by: INTERNAL MEDICINE

## 2024-04-04 PROCEDURE — 82565 ASSAY OF CREATININE: CPT

## 2024-05-14 DIAGNOSIS — G25.0 ESSENTIAL TREMOR: ICD-10-CM

## 2024-05-14 RX ORDER — PRAMIPEXOLE DIHYDROCHLORIDE 0.25 MG/1
0.25 TABLET ORAL 3 TIMES DAILY
Qty: 270 TABLET | Refills: 0 | Status: SHIPPED | OUTPATIENT
Start: 2024-05-14

## 2024-05-14 NOTE — TELEPHONE ENCOUNTER
Medication: PRAMIPEXOLE 0.25 MG Oral Tab      Date of last refill: 10/23/2023 (#270/1)  [place]     Last office visit: 10/23/2023  Due back to clinic per last office note:  not stated  Date next office visit scheduled:    No future appointments.        Last OV note recommendation:    Assessment:  This is a 58 y/o male with multiple medical problems and coming to the neurology clinic for concerns of memory loss, frequent headache RLS and tremors.          Plan:  1. RLS  - Pramipexole 0.25mg TID           Tony Nguyễn DO  Neurology

## 2024-08-14 DIAGNOSIS — G25.0 ESSENTIAL TREMOR: ICD-10-CM

## 2024-08-14 RX ORDER — PRAMIPEXOLE DIHYDROCHLORIDE 0.25 MG/1
0.25 TABLET ORAL 3 TIMES DAILY
Qty: 270 TABLET | Refills: 0 | Status: SHIPPED | OUTPATIENT
Start: 2024-08-14

## 2024-08-14 NOTE — TELEPHONE ENCOUNTER
Medication: PRAMIPEXOLE 0.25 MG Oral Tab      Date of last refill: 05/14/2024 (#270/0)  [place]     Last office visit: 10/23/2023  Due back to clinic per last office note:  not stated  Date next office visit scheduled:    No future appointments.        Last OV note recommendation:    Assessment:  This is a 58 y/o male with multiple medical problems and coming to the neurology clinic for concerns of memory loss, frequent headache RLS and tremors.          Plan:  1. RLS  - Pramipexole 0.25mg TID           Tony Nguyễn DO  Neurology

## 2024-11-06 ENCOUNTER — OFFICE VISIT (OUTPATIENT)
Dept: INTERNAL MEDICINE CLINIC | Facility: CLINIC | Age: 59
End: 2024-11-06
Payer: COMMERCIAL

## 2024-11-06 VITALS
WEIGHT: 248 LBS | HEIGHT: 68.5 IN | HEART RATE: 74 BPM | DIASTOLIC BLOOD PRESSURE: 80 MMHG | SYSTOLIC BLOOD PRESSURE: 130 MMHG | RESPIRATION RATE: 16 BRPM | OXYGEN SATURATION: 95 % | BODY MASS INDEX: 37.15 KG/M2

## 2024-11-06 DIAGNOSIS — Z51.81 THERAPEUTIC DRUG MONITORING: Primary | ICD-10-CM

## 2024-11-06 DIAGNOSIS — Z87.19 H/O ACUTE PANCREATITIS: ICD-10-CM

## 2024-11-06 DIAGNOSIS — E55.9 VITAMIN D DEFICIENCY: ICD-10-CM

## 2024-11-06 DIAGNOSIS — G47.33 OSA ON CPAP: ICD-10-CM

## 2024-11-06 DIAGNOSIS — E78.00 PURE HYPERCHOLESTEROLEMIA: ICD-10-CM

## 2024-11-06 DIAGNOSIS — E66.9 OBESITY (BMI 30-39.9): ICD-10-CM

## 2024-11-06 DIAGNOSIS — I10 ESSENTIAL HYPERTENSION: ICD-10-CM

## 2024-11-06 PROCEDURE — 99204 OFFICE O/P NEW MOD 45 MIN: CPT | Performed by: NURSE PRACTITIONER

## 2024-11-06 RX ORDER — TIRZEPATIDE 2.5 MG/.5ML
2.5 INJECTION, SOLUTION SUBCUTANEOUS WEEKLY
Qty: 2 ML | Refills: 0 | COMMUNITY
Start: 2024-11-06

## 2024-11-06 RX ORDER — DULOXETIN HYDROCHLORIDE 30 MG/1
30 CAPSULE, DELAYED RELEASE ORAL DAILY
COMMUNITY
Start: 2024-10-29

## 2024-11-06 NOTE — PROGRESS NOTES
HISTORY OF PRESENT ILLNESS  Chief Complaint   Patient presents with    Weight Problem     Self ref, no prior wl management, open to meds      Mo Jean is a 58 year old male new to our office today for initiation of medical weight loss program.  Patient presents today with c/o excess weight. Referred by, self referral     Has been struggling weight gain for the past couple of years...   Hx of gallbladder surgery (vertical incision), bilary leak in 2008   Currently has 3 abdominal hernias that need to be repaired (was told that he needed to lose weight first)- but was also told that he can't exercise  PCP tried to get wegovy covered (but was not successful)    Denies chest pain, shortness of breath, dizziness, blurred vision, headache, paresthesia, nausea/vomiting.     Reason/goal for weight loss: lose 30 lbs in 6 months and lose 50 lbs in 1 year  Triggers for weight gain? Stress, Injury, and Eating out  Previous weight loss programs? NO  Previous weight loss medications? Phentermine (Adipex)- stopped due to lack of sleep     Reviewed [facility] patient contract: Readiness for Lifestyle change: 6/10, Interest in Medication: 5/10, Bariatric surgery interest: 5/10    Weight  Starting weight:  248  Max weight: 248  Lowest weight: 215    Wt Readings from Last 6 Encounters:   11/06/24 248 lb (112.5 kg)   10/23/23 245 lb (111.1 kg)   10/31/22 230 lb (104.3 kg)   10/29/21 238 lb (108 kg)   08/17/21 240 lb 3.2 oz (109 kg)   05/14/21 240 lb (108.9 kg)        Typical diet   Breakfast Lunch Dinner Snacks Fluids   Egg, turkey sausage and cheese pizza variety Protein bar, grapes, popcorn  Water: 30 oz   Soda: +diet soda  Juice:  Coffee/Tea:     Portion: medium   Eats 3 meals per day: yes   Number of restaurant or fast food meals/week: 3 meals/week    Social hx and lifestyle reviewed:    Work: [employer]   Marital status:    Support: yes  Tobacco use: none  ETOH use:  2 per/week  Supplements: vitamins C, D, and One A Day Men's    Exercise: walking  Stress level: 7/10  Sleep hours and integrity: 6 Hours per night    MEDICAL HISTORY  PMH reviewed:   Cardiac disorders: HTN,  pure hypercholesteremia   Depression/anxiety: negative  Glaucoma: negative  Kidney stones: negative  Eating disorder: negative  Migraines/seizures: negative  Joint-related conditions: 2 fractured hips, RA  Liver disease: negative  Thyroid disease: negative  Constipation: negative  Diabetes: negative  Sleep Apnea hx: NATALYA- on CPAP   Cancer hx: negative  DVT: negative  Family or personal history of Pancreatic issues / Medullary Thyroid Cancer: negative  History of bariatric surgery: negative    FMH reviewed: obesity in parent/s or sibling: older sister, father (at one point)      REVIEW OF SYSTEMS  GENERAL: feels well otherwise, and negative fatigue   SKIN: denies any rashes to skin folds  HEENT: snoring- yes; denies neck thickening  LUNGS: denies shortness of breath with exertion, no apnea  CARDIOVASCULAR: denies chest pain on exertion, denies palpitations or pedal edema  GI: denies abdominal pain, distention, No N/V/D/C  MUSCULOSKELETAL: denies back pain, joint pains (bilat. Hips)    NEURO: denies headaches or dizziness  ENDOCRINE: denies any excess hunger, urination or thirst, denies any purple striae  PSYCH: denies change in behavior or mood, denies feeling sad or depressed    EXAM  /80   Pulse 74   Resp 16   Ht 5' 8.5\" (1.74 m)   Wt 248 lb (112.5 kg)   SpO2 95%   BMI 37.16 kg/m² , Percent body fat:  M >25% Male 34.6%;  visceral fat 25 Muscle Mass: 46.2lbs%       GENERAL: well developed, well nourished, in no apparent distress  SKIN: warm, pink, dry without rashes to exposed area   EYES: conjunctiva pink, sclera non icteric, PERRLA  HEENT: atraumatic, normocephalic, O/p: Mallampati score- 2  NECK: supple, non tender, no adenopathy, no thyromegaly  LUNGS: CTA in all fields, breathing non labored  CARDIO: RRR without murmur, normal S1 and S2 without clicks or  gallops, no pedal edema  GI: +BS, soft, no masses, HSM or tenderness  EXTREMITIES: grossly intact  NEURO: Oriented times three, full ROM of bilateral UE/LE  PSYCH: pleasant, cooperative, normal mood and affect    Lab Results   Component Value Date    GLU 93 01/23/2023    BUN 16 01/23/2023    BUNCREA 10.1 08/20/2020    CREATSERUM 1.15 01/23/2023    ANIONGAP 5 01/23/2023    GFR 77 11/01/2017    GFRNAA 69 08/20/2020    GFRAA 80 08/20/2020    CA 9.2 01/23/2023    OSMOCALC 289 01/23/2023    ALKPHO 73 01/23/2023    AST 36 01/23/2023    ALT 51 01/23/2023    BILT 0.9 01/23/2023    TP 7.3 01/23/2023    ALB 4.0 01/23/2023    GLOBULIN 3.3 01/23/2023    AGRATIO 1.7 06/30/2010     01/23/2023    K 4.5 01/23/2023     01/23/2023    CO2 26.0 01/23/2023     Lab Results   Component Value Date     01/04/2019    A1C 5.2 01/04/2019     Lab Results   Component Value Date    CHOLEST 182.00 08/28/2019    TRIG 139.00 08/28/2019    HDL 34 (L) 08/28/2019     08/28/2019    VLDL 27.8 08/28/2019     Lab Results   Component Value Date    B12 704 08/28/2019    VITB12 467 11/08/2010     Lab Results   Component Value Date    VITD 21.59 (L) 08/28/2019       Medications Ordered Prior to Encounter[1]    ASSESSMENT/PLAN    ICD-10-CM    1. Therapeutic drug monitoring  Z51.81       2. Obesity (BMI 30-39.9)  E66.9       3. Vitamin D deficiency  E55.9       4. H/O acute pancreatitis  Z87.19       5. NATALYA on CPAP  G47.33       6. Pure hypercholesterolemia  E78.00       7. Essential hypertension  I10         Initial Weight Data and Goal Weight Loss:  Weight Calculations  Initial Weight: 248 lbs  Initial Weight Date: 11/06/24  Today's Weight: 248 lbs  5% Goal: 12.4  10% Goal: 24.8  Total Weight Loss: 0 lbs  Initial consult: 248 lbs on 11/2024, Down  Lb:  lbs total     PLAN   Will trial zepbound 2.5mg weekly X 4 weeks (sample and will send to [facility])  denies any personal or family history of pancreatitis (isolated to gallbladder  surgery in 2008)- educated patient on possible risk, pancreatic cancer, thyroid cancer, MEN2-   --advised of side effects and adverse effects of this medication  Contradictions: has done phentermine with another provider  Body composition test results given   Reviewed labs, baseline labs - will get faxed from outside PCP   Continue with vitamin d OTC   HTN  stable, follows with PCP   HLD  stable, follows with PCP   Joint pain, recommended aqua therapy   NATALYA- continue with nightly CPAP machine  Decrease carbs, increase protein, no skipping meals   Needs to incorporate exercise regimen FITTE: ACSM recommendations (150-300 minutes/ week in active weight loss)   Follow up with dietitian and psychologist as recommended.  Discussed the role of sleep and stress in weight management.  Counseled on comprehensive weight loss plan including attention to nutrition, exercise and behavior/stress management for success. See patient instruction below for more details.    Total time spent on chart review, pre-charting, obtaining history, counseling, and educating, reviewing labs was 50 minutes.       NOTE TO PATIENT: The 21st Century Cures Act makes clinical notes like these available to patients in the interest of transparency. Clinical notes are medical documents used by physicians and care providers to communicate with each other. These documents include medical language and terminology, abbreviations, and treatment information that may sound technical and at times possibly unfamiliar. In addition, at times, the verbiage may appear blunt or direct. These documents are one tool providers use to communicate relevant information and clinical opinions of the care providers in a way that allows common understanding of the clinical context.     Weight Loss Contract reviewed and signed today 11/6/2024    There are no Patient Instructions on file for this visit.    No follow-ups on file.    Patient verbalizes understanding.    Racheal Arteaga  AMADEO Sevilla           [1]   Current Outpatient Medications on File Prior to Visit   Medication Sig Dispense Refill    DULoxetine 30 MG Oral Cap DR Particles Take 1 capsule (30 mg total) by mouth daily.      PRAMIPEXOLE 0.25 MG Oral Tab TAKE 1 TABLET(0.25 MG) BY MOUTH THREE TIMES DAILY 270 tablet 0    SULFASALAZINE 500 MG Oral Tab TAKE 1 TABLET(500 MG) BY MOUTH TWICE DAILY 180 tablet 0    Pantoprazole Sodium 40 MG Oral Tab EC Take 1 tablet (40 mg total) by mouth every morning before breakfast. 30 tablet 0    Fexofenadine HCl (ALLEGRA) 180 MG Oral Tab Take 1 tablet (180 mg total) by mouth daily.      Montelukast Sodium (SINGULAIR) 10 MG Oral Tab Take 1 tablet (10 mg total) by mouth nightly.      Cholecalciferol (VITAMIN D3) 2000 UNITS Oral Tab Take 1 tablet (2,000 Units total) by mouth daily.      Albuterol Sulfate HFA (VENTOLIN) 108 (90 BASE) MCG/ACT Inhalation Aero Soln Inhale 1-2 puffs into the lungs every 6 (six) hours as needed for Wheezing.      ClonazePAM (KLONOPIN) 0.5 MG Oral Tab Take 1 tablet (0.5 mg total) by mouth 2 (two) times daily as needed for Anxiety.      lisinopril (PRINIVIL,ZESTRIL) 10 MG Oral Tab Take 1 tablet (10 mg total) by mouth daily.      Lovastatin 40 MG Oral Tab Take 1 tablet (40 mg total) by mouth nightly.      [DISCONTINUED] VITAMIN D OR None Entered       No current facility-administered medications on file prior to visit.

## 2024-11-06 NOTE — PATIENT INSTRUCTIONS
Welcome to the [facility] Weight Management Program!!  Thank you for placing your trust in our health care team, I look forward to working with you along this journey to better health!  Next steps:     1.  Schedule a personal nutrition consultation with one of our registered dieticians. Bring along your food journal (3 days minimum). See journal options below.  2.  Fill your prescribed medication and take as discussed and prescribed: zepbound/mounjaro 2.5mg weekly x 4 weeks (we can send in the next to [facility])      Please try to work on the following dietary changes this first month:  Daily protein recommendation to start:  grams  Daily carbohydrate: <150g  Daily calories: 1,800-1,900  1.  Drink water with meals and throughout the day, cut down on soda and/or juice if consumed. Consider flavored water options like Bubbly, Spindrift, Hint and Katlyn.  2.  Eat breakfast daily and focus on having protein with each meal, examples include: greek yogurt, cottage cheese, hard boiled egg, whole grain toast with peanut butter.   3.  Reduce refined carbohydrates and sugars which includes items such as sweets, as well as rice, pasta, and bread and make sure to choose whole grain options when having them with just 1 serving per meal about the size of your inner palm.  4.  Consume non starchy veggies daily working towards making them a good 50% of your daily food intake. Add them to lunch and dinner consistently.  5.  Optional can start a daily probiotic: VSL#3, (order on line at www.vsl3.com). Take 1 capsule daily with water for 30 days, then reduce to 1 every other day (this will reduce the cost). Capsules can be left out for 2 weeks, but then must be refrigerated.      Please download lynette My Fitness Pal, LoseIt! Or Net Diary to monitor daily dietary intake and you will be able to see if you are eating the right amount of calories or too much or too little which would hinder weight loss. Additionally this will  help to see your daily carbohydrate and protein intake. When you set the mariaelena up choose 1-2 lbs/week as a goal.  Keeping a paper food journal is an option as well to remain accountable for your choices- this is the start to mindful eating! A low calorie diet has been consistently shown to support weight loss.     Continue or start exercising to help establish a routine. If not already exercising begin with 1 day and progress as able with long-term goal of 30 minutes 5 days a week at a minimum.     Meditation daily can help manage and control stress. Chronic stress can make weight loss difficult.  Exercising is one way to help with stress, but meditation using the CALM Mariaelena or another comparable alternative can be done in your home or place of work with little time commitment. This Mariaelena can also help work on behavior change and improve sleep. Check out the segment under Calm Masterclass and listen to The 4 Pillars of Health. A great way to begin learning about the foundation of lifestyle with practical tips to use in your every day.     Check out www.yourweightmatters.org blog for continued daily support and education along this weight loss journey!    Patient Resources:     Personal Training/Fitness Classes/Health Coaching     [facility] @ [email or web address]-center Full fitness center with group fitness and personal training. Discount available as client of [employer] [facility].  Health Coaching and Personal Training with Sharon Schroeder at our [place] [facility]- individual weekly coaching with option to add personal training and small group fitness classes targeted at weight loss- [phone] and/or email @ [email or web address]  360FIT [place] http://[email or web address] Group Fitness [phone] and/or email Carlee at [email or web address]  FrancLists of hospitals in the [place]ed Fitness Centers with multiple locations: SVAS Biosana  (www.Sellfy), Eat The [employer] Fitness (www.Insticator.Apprity), Black Drumm Fitness (www.Capricorn Food [employer] [place]), The Exercise  (www.exercisecoach.Apprity)     Online Fitness  Fitness  on Utube  Fit in 10 DVD series- www.qinls59VIH.com  Sit and Be Fit - Chair exercise series Www.sitandbefit.org  Hip Hop Fit with Abelardo Roberts at www.hiphopfit.net     Apps for on the Go Fitness  Mission 7 Minute Workout (orange box with white 7) - free on the go HIIT training mariaelena  Peloton Mariaelena @ www.onepeloton.com     Nutrition Trackers and Tools  LoseIT! And My Fitness Pal apps and on line for tracking nutrition  NOOM - virtual health coaching  FitFoundation (healthy meals on the go) in [place] @ wwwProtonMediazevzyfzzxrbaw5eRevel Touch  Bistro MD @ iCouchtromd.com and Mamlks03 (keto and low carb plans recommended) @ www.evqdji27.com, Metabolic Meals @ www.FloobitsMetabolicMeals.com - individual prepared meals to go  Gobble, Blue Apron, Home , Every Plate, Sunbasket- on line meal delivery programs for preparation at home  Meal Village in [place] for homemade meals to go @ wwwProtonMediamealStriivllage.Apprity  Diet Doctor @ www.dietdoctor.Apprity - low carb swaps  Check-Cap - meal prep and planning mariaelena (www.yummly.com)     Stress Management/Behavior/Mindful Eating  CALM meditation mariaelena (www.calm.com)  Headspace  Am I Hungry? Mindful eating virtual  mariaelena  Www.yourweightmatters.org - Obesity Action Coalition sponsored Blog posts daily  Motivation mariaelena (black box with white \")- daily supportive messages sent to your phone     Books/Video Education/Podcasts  Mindless Eating by Davi Alamo  Why We Get Sick by Quoc Girard (a book about insulin resistance)  Atomic Habits by Shaggy Fu (a book about taking small steps to promote greater behavior change)   Can't Hurt Me by Tommie Magana (a book exploring the power of discipline in achieving your goals)  The End of Dieting: How to Live for Life by Dr. Nishant Corona M.D. or listen to The ComptTIA Podcast  Episode 63: Understanding \"Nutritarian\" Eating w/Dr. Nishant Coorna  Your Body in Balance: The New Science of Food, Hormones, and Health by Dr. Ton Adams  The Menopause Diet Plan by Gia Vanessa and Magui Pereyra  The Complete Guide to fasting by Dr. Steve  Sugar, Salt & Fat by Carine Olivo, Ph.D, R.D.  Weight Loss Surgery Will Not Treat Food Addiction by Helen Fregoso Ph.D  The Game Changers- AccessSportsMedia.com Documentary on plant based nutrition  Fed Up - documentary about obesity (Free on Utube)  The Truth About Sugar - documentary on sugar (Free on [employer], https://youtu.be/7E2hjwfRY4h)  The Dr. Goodman T5 Wellness Plan by Dr. Emmanuel Goodman MD  Fitlosophy Fitspiration - journal to better health (found at [employer] in fitness aisle)  What Happened to You?- a look at the impact trauma has on behavior written by Za Jackson and Dr. Martin Alonso  Whole Again by Marquez Tipton - discovering your true self after trauma  Nirmala Barnes talk on AccessSportsMedia.com, The Call to Courage  Podcasts: The Exam Room by the Physician's Committee, Nutrition Facts by Dr. Avelar    We are here to support you with weight loss, but please remember that you still need your primary care provider for your routine health maintenance.

## 2024-11-09 ENCOUNTER — PATIENT MESSAGE (OUTPATIENT)
Dept: INTERNAL MEDICINE CLINIC | Facility: CLINIC | Age: 59
End: 2024-11-09

## 2024-11-14 DIAGNOSIS — G25.0 ESSENTIAL TREMOR: ICD-10-CM

## 2024-11-14 RX ORDER — PRAMIPEXOLE DIHYDROCHLORIDE 0.25 MG/1
0.25 TABLET ORAL 3 TIMES DAILY
Qty: 90 TABLET | Refills: 0 | Status: SHIPPED | OUTPATIENT
Start: 2024-11-14

## 2024-11-14 NOTE — TELEPHONE ENCOUNTER
Sent the patient a [facility] message to make an appointment for further medication refills.       Medication:  PRAMIPEXOLE 0.25 MG Oral Tab      Date of last refill: 08/14/2024 (#270/0)  [place]     Last office visit: 10/23/2023  Due back to clinic per last office note:  not stated  Date next office visit scheduled:    Future Appointments   Date Time Provider Department Center   4/7/2025  2:20 PM Racheal Sevilla APRN [facility]   7/14/2025  4:40 PM Racheal Sevilla APRN [facility]           Last OV note recommendation:      Assessment:  This is a 56 y/o male with multiple medical problems and coming to the neurology clinic for concerns of memory loss, frequent headache RLS and tremors.          Plan:  1. RLS  - Pramipexole 0.25mg TID           Tony Nguyễn,   Neurology

## 2024-12-04 ENCOUNTER — PATIENT MESSAGE (OUTPATIENT)
Dept: INTERNAL MEDICINE CLINIC | Facility: CLINIC | Age: 59
End: 2024-12-04

## 2024-12-04 DIAGNOSIS — E66.9 OBESITY (BMI 30-39.9): Primary | ICD-10-CM

## 2024-12-04 NOTE — TELEPHONE ENCOUNTER
Had visit 11/6/24 and got mounjaro 2.5 mg sample   Now asking about change to zepbound through [facility]

## 2024-12-05 RX ORDER — TIRZEPATIDE 2.5 MG/.5ML
2.5 INJECTION, SOLUTION SUBCUTANEOUS WEEKLY
Qty: 2 ML | Refills: 2 | Status: SHIPPED | OUTPATIENT
Start: 2024-12-05

## 2024-12-17 DIAGNOSIS — G25.0 ESSENTIAL TREMOR: ICD-10-CM

## 2024-12-17 RX ORDER — PRAMIPEXOLE DIHYDROCHLORIDE 0.25 MG/1
0.25 TABLET ORAL 3 TIMES DAILY
Qty: 90 TABLET | Refills: 0 | Status: SHIPPED | OUTPATIENT
Start: 2024-12-17

## 2024-12-17 NOTE — TELEPHONE ENCOUNTER
Pt has appt 3/3/25      Medication:  PRAMIPEXOLE 0.25 MG Oral Tab      Date of last refill: 11/14/2024 (#90/0)  [place]     Last office visit: 10/23/2023  Due back to clinic per last office note: Not Indicated    Date next office visit scheduled:    Future Appointments   Date Time Provider Department Center   3/3/2025  1:55 PM Tony Nguyễn DO [facility] [place]   4/7/2025  2:20 PM Racheal Sevilla APRN [facility]   7/14/2025  4:40 PM Racheal Sevilla APRN [facility]           Last OV note recommendation:    Assessment:  This is a 58 y/o male with multiple medical problems and coming to the neurology clinic for concerns of memory loss, frequent headache RLS and tremors.          Plan:  1. RLS  - Pramipexole 0.25mg TID           Tony Nguyễn DO  Neurology

## 2024-12-20 RX ORDER — PRAMIPEXOLE DIHYDROCHLORIDE 0.25 MG/1
0.25 TABLET ORAL 3 TIMES DAILY
Qty: 270 TABLET | Refills: 0 | OUTPATIENT
Start: 2024-12-20

## 2025-01-15 DIAGNOSIS — G25.0 ESSENTIAL TREMOR: ICD-10-CM

## 2025-01-15 RX ORDER — PRAMIPEXOLE DIHYDROCHLORIDE 0.25 MG/1
0.25 TABLET ORAL 3 TIMES DAILY
Qty: 90 TABLET | Refills: 1 | Status: SHIPPED | OUTPATIENT
Start: 2025-01-15

## 2025-01-15 NOTE — TELEPHONE ENCOUNTER
Medication:  PRAMIPEXOLE 0.25 MG     Date of last refill:  12/17/2024 (#90/0)  [place]      Last office visit: 10/23/2023  Due back to clinic per last office note:  not noted  Date next office visit scheduled:    Future Appointments   Date Time Provider Department Center   3/3/2025  1:55 PM Tony Nguyễn DO [facility] [place]   4/7/2025  2:20 PM Racheal Sevilla APRN [facility]   7/22/2025  2:40 PM Racheal Sevilla APRN [facility]           Last OV note recommendation:    Plan:  1. RLS  - Pramipexole 0.25mg TID

## 2025-02-19 DIAGNOSIS — G25.0 ESSENTIAL TREMOR: ICD-10-CM

## 2025-02-19 RX ORDER — PRAMIPEXOLE DIHYDROCHLORIDE 0.25 MG/1
0.25 TABLET ORAL 3 TIMES DAILY
Qty: 90 TABLET | Refills: 1 | OUTPATIENT
Start: 2025-02-19

## 2025-02-19 NOTE — TELEPHONE ENCOUNTER
Spoke with the pharmacy who states to disregard the refill request.         Medication: PRAMIPEXOLE 0.25 MG Oral Tab      Date of last refill: 01/15/2025 (#90/1)  [place]     Last office visit: 10/23/2023  Due back to clinic per last office note:  not stated  Date next office visit scheduled:    Future Appointments   Date Time Provider Department Center   3/3/2025  1:55 PM Tony Nguyễn DO [facility] [place]   4/7/2025  2:20 PM Racheal Sevilla APRN [facility]   7/22/2025  2:40 PM Racheal Sevilla APRN [facility]           Last OV note recommendation:    Assessment:  This is a 58 y/o male with multiple medical problems and coming to the neurology clinic for concerns of memory loss, frequent headache RLS and tremors.          Plan:  1. RLS  - Pramipexole 0.25mg TID           Tony Nguyễn DO  Neurology

## 2025-03-03 ENCOUNTER — OFFICE VISIT (OUTPATIENT)
Dept: NEUROLOGY | Facility: CLINIC | Age: 60
End: 2025-03-03
Payer: COMMERCIAL

## 2025-03-03 VITALS
OXYGEN SATURATION: 96 % | BODY MASS INDEX: 36.29 KG/M2 | DIASTOLIC BLOOD PRESSURE: 70 MMHG | WEIGHT: 245 LBS | SYSTOLIC BLOOD PRESSURE: 112 MMHG | RESPIRATION RATE: 16 BRPM | HEIGHT: 69 IN | HEART RATE: 77 BPM

## 2025-03-03 DIAGNOSIS — G25.0 ESSENTIAL TREMOR: ICD-10-CM

## 2025-03-03 DIAGNOSIS — G25.81 RLS (RESTLESS LEGS SYNDROME): Primary | ICD-10-CM

## 2025-03-03 PROCEDURE — 99213 OFFICE O/P EST LOW 20 MIN: CPT | Performed by: OTHER

## 2025-03-03 RX ORDER — PRAMIPEXOLE DIHYDROCHLORIDE 0.25 MG/1
0.25 TABLET ORAL 3 TIMES DAILY
Qty: 270 TABLET | Refills: 1 | Status: SHIPPED | OUTPATIENT
Start: 2025-03-03
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
## 2025-03-03 NOTE — PATIENT INSTRUCTIONS
Refill policies:    Allow 2-3 business days for refills; controlled substances may take longer.  Contact your pharmacy at least 5 days prior to running out of medication and have them send an electronic request or submit request through the “request refill” option in your [employer] account.  Refills are not addressed on weekends; covering physicians do not authorize routine medications on weekends.  No narcotics or controlled substances are refilled after noon on Fridays or by on call physicians.  By law, narcotics must be electronically prescribed.  A 30 day supply with no refills is the maximum allowed.  If your prescription is due for a refill, you may be due for a follow up appointment.  To best provide you care, patients receiving routine medications need to be seen at least once a year.  Patients receiving narcotic/controlled substance medications need to be seen at least once every 3 months.  In the event that your preferred pharmacy does not have the requested medication in stock (e.g. Backordered), it is your responsibility to find another pharmacy that has the requested medication available.  We will gladly send a new prescription to that pharmacy at your request.    Scheduling Tests:    If your physician has ordered radiology tests such as MRI or CT scans, please contact Central Scheduling at [phone] right away to schedule the test.  Once scheduled, the [facility] Centralized Referral Team will work with your insurance carrier to obtain pre-certification or prior authorization.  Depending on your insurance carrier, approval may take 3-10 days.  It is highly recommended patients assure they have received an authorization before having a test performed.  If test is done without insurance authorization, patient may be responsible for the entire amount billed.      Precertification and Prior Authorizations:  If your physician has recommended that you have a procedure or additional testing performed the [facility]  Centralized Referral Team will contact your insurance carrier to obtain pre-certification or prior authorization.    You are strongly encouraged to contact your insurance carrier to verify that your procedure/test has been approved and is a COVERED benefit.  Although the [facility] Centralized Referral Team does its due diligence, the insurance carrier gives the disclaimer that \"Although the procedure is authorized, this does not guarantee payment.\"    Ultimately the patient is responsible for payment.   Thank you for your understanding in this matter.  Paperwork Completion:  If you require FMLA or disability paperwork for your recovery, please make sure to either drop it off or have it faxed to our office at [phone]. Be sure the form has your name and date of birth on it.  The form will be faxed to our Forms Department and they will complete it for you.  There is a 25$ fee for all forms that need to be filled out.  Please be aware there is a 10-14 day turnaround time.  You will need to sign a release of information (SOLA) form if your paperwork does not come with one.  You may call the Forms Department with any questions at [phone].  Their fax number is [phone].

## 2025-04-07 ENCOUNTER — OFFICE VISIT (OUTPATIENT)
Dept: INTERNAL MEDICINE CLINIC | Facility: CLINIC | Age: 60
End: 2025-04-07
Payer: COMMERCIAL

## 2025-04-07 VITALS
DIASTOLIC BLOOD PRESSURE: 78 MMHG | HEART RATE: 73 BPM | OXYGEN SATURATION: 99 % | SYSTOLIC BLOOD PRESSURE: 128 MMHG | RESPIRATION RATE: 18 BRPM | BODY MASS INDEX: 35.4 KG/M2 | HEIGHT: 69 IN | WEIGHT: 239 LBS

## 2025-04-07 DIAGNOSIS — Z87.19 H/O ACUTE PANCREATITIS: ICD-10-CM

## 2025-04-07 DIAGNOSIS — E66.9 OBESITY (BMI 30-39.9): ICD-10-CM

## 2025-04-07 DIAGNOSIS — E55.9 VITAMIN D DEFICIENCY: ICD-10-CM

## 2025-04-07 DIAGNOSIS — I10 ESSENTIAL HYPERTENSION: ICD-10-CM

## 2025-04-07 DIAGNOSIS — G47.33 OSA ON CPAP: ICD-10-CM

## 2025-04-07 DIAGNOSIS — Z51.81 THERAPEUTIC DRUG MONITORING: Primary | ICD-10-CM

## 2025-04-07 DIAGNOSIS — E78.00 PURE HYPERCHOLESTEROLEMIA: ICD-10-CM

## 2025-04-07 RX ORDER — ONDANSETRON 8 MG/1
8 TABLET, ORALLY DISINTEGRATING ORAL EVERY 8 HOURS PRN
COMMUNITY
Start: 2024-12-18

## 2025-04-07 NOTE — PROGRESS NOTES
HISTORY OF PRESENT ILLNESS  Chief Complaint   Patient presents with    Weight Check     Down 9 lb(11/06/24)     Mo Jean is a 59 year old male here for follow up with medical weight loss program for the treatment of overweight, obesity, or morbid obesity.     Down 9 lbs (first month follow-up from 11/2024)  Compliant on zepbound 2.5mg weekly ([facility])   Tolerating well, helping with decreasing appetite and no side effects     Lost 20 lbs over the past 2 months (with Zepbound injection)-however will not be able to afford this medication going forward  Is still awaiting abdominal hernia surgery since needs to lose weight, before they will perform  Reduced red meat   Saw PCP and they had tired to get zepbound approved (for both weight loss and NATALYA) and both got denied   Exercise/Activity: 7x/ week, via walking, not doing anything routine as far as exercise-limited due to hernias and bilateral hip pain  Nutrition: eating regular meals, +protein, minimal veggies. not tracking reports  Meals out per week on average: 0  Stress is manageable   Sleep: 6 hours/night, waking up feeling rested    Denies chest pain, shortness of breath, dizziness, blurred vision, headache, paresthesia, nausea/vomiting.     Breakfast Lunch Dinner Snacks Fluids   Reviewed              Wt Readings from Last 6 Encounters:   04/07/25 239 lb (108.4 kg)   03/03/25 245 lb (111.1 kg)   11/06/24 248 lb (112.5 kg)   10/23/23 245 lb (111.1 kg)   10/31/22 230 lb (104.3 kg)   10/29/21 238 lb (108 kg)          REVIEW OF SYSTEMS  GENERAL: feels well otherwise, denied any fevers chills or night sweats   LUNGS: denies shortness of breath  CARDIOVASCULAR: denies chest pain  GI: denies abdominal pain  MUSCULOSKELETAL: denies back pain, joint pains (bilat. Hips)    PSYCH: denies change in behavior or mood, denies feeling sad or depressed    EXAM  /78   Pulse 73   Resp 18   Ht 5' 9\" (1.753 m)   Wt 239 lb (108.4 kg)   SpO2 99%   BMI 35.29 kg/m²        GENERAL: well developed, well nourished, in no apparent distress, A/O x3  SKIN: no rashes, no suspicious lesions  HEENT: atraumatic, normocephalic, OP-clear, PERRLA  NECK: supple, no adenopathy  LUNGS: CTA in all fields, breathing non labored  CARDIO: RRR without murmur  GI: +BS, NT/ND, no masses or HSM  EXTREMITIES: no cyanosis, no clubbing, no edema    Lab Results   Component Value Date    GLU 93 01/23/2023    BUN 16 01/23/2023    BUNCREA 10.1 08/20/2020    CREATSERUM 1.15 01/23/2023    ANIONGAP 5 01/23/2023    GFR 77 11/01/2017    GFRNAA 69 08/20/2020    GFRAA 80 08/20/2020    CA 9.2 01/23/2023    OSMOCALC 289 01/23/2023    ALKPHO 73 01/23/2023    AST 36 01/23/2023    ALT 51 01/23/2023    BILT 0.9 01/23/2023    TP 7.3 01/23/2023    ALB 4.0 01/23/2023    GLOBULIN 3.3 01/23/2023    AGRATIO 1.7 06/30/2010     01/23/2023    K 4.5 01/23/2023     01/23/2023    CO2 26.0 01/23/2023     Lab Results   Component Value Date     01/04/2019    A1C 5.2 01/04/2019     Lab Results   Component Value Date    CHOLEST 182.00 08/28/2019    TRIG 139.00 08/28/2019    HDL 34 (L) 08/28/2019     08/28/2019    VLDL 27.8 08/28/2019     Lab Results   Component Value Date    B12 704 08/28/2019    VITB12 467 11/08/2010     Lab Results   Component Value Date    VITD 21.59 (L) 08/28/2019       Medications Ordered Prior to Encounter[1]    ASSESSMENT/PLAN    ICD-10-CM    1. Therapeutic drug monitoring  Z51.81       2. Obesity (BMI 30-39.9)  E66.9       3. Vitamin D deficiency  E55.9       4. H/O acute pancreatitis  Z87.19       5. Essential hypertension  I10       6. Pure hypercholesterolemia  E78.00       7. NATALYA on CPAP  G47.33           PLAN   Initial Weight Data and Goal Weight Loss:  Initial consult: #248 lbs on 11/2024  Weight Calculations  Initial Weight: 248 lbs  Initial Weight Date: 11/06/24  Today's Weight: 239 lbs  5% Goal: 12.4  10% Goal: 24.8  Total Weight Loss: 9 lbs  Total weight loss: Down 9 lbs total,  Net loss 9 lbs  Can't continue with medications: zepbound 2.5mg (due to elevated cost)-see HPI about PCP trying to get stepdown covered for weight loss and NATALYA  --advised of side effects and adverse effects of this medication  Contradictions: has done phentermine with another provider   Reviewed labs, baseline labs - will get faxed from outside PCP   Continue with vitamin d OTC   HTN  stable, follows with PCP   HLD  stable, follows with PCP   Joint pain, recommended aqua therapy   NATALYA- continue with nightly CPAP machine, AHI score is 10 in 2/2017  Wrote out macros and encouraged tracking food  Nutrition: Low carb diet, recommended to eat breakfast daily/ regular protein intake  Follow up with dietitian and psychologist as recommended.  Discussed the role of sleep and stress in weight management.  Counseled on comprehensive weight loss plan including attention to nutrition, exercise and behavior/stress management for success. See patient instruction below for more details.  Discussed strategies to overcome barriers to successful weight loss and weight maintenance  FITTE: ACSM recommendations (150-300 minutes/ week in active weight loss)   Weight Loss Consent to treat reviewed and signed.    Total time spent on chart review, pre-charting, obtaining history, counseling, and educating, reviewing labs was 30 minutes.       NOTE TO PATIENT: The 21st Century Cures Act makes clinical notes like these available to patients in the interest of transparency. Clinical notes are medical documents used by physicians and care providers to communicate with each other. These documents include medical language and terminology, abbreviations, and treatment information that may sound technical and at times possibly unfamiliar. In addition, at times, the verbiage may appear blunt or direct. These documents are one tool providers use to communicate relevant information and clinical opinions of the care providers in a way that allows  common understanding of the clinical context.     There are no Patient Instructions on file for this visit.    No follow-ups on file.    Patient verbalizes understanding.    AMADEO Pearson             [1]   Current Outpatient Medications on File Prior to Visit   Medication Sig Dispense Refill    ondansetron 8 MG Oral Tablet Dispersible Take 1 tablet (8 mg total) by mouth every 8 (eight) hours as needed.      pramipexole 0.25 MG Oral Tab Take 1 tablet (0.25 mg total) by mouth 3 (three) times daily. 270 tablet 1    SULFASALAZINE 500 MG Oral Tab TAKE 1 TABLET(500 MG) BY MOUTH TWICE DAILY 180 tablet 0    Pantoprazole Sodium 40 MG Oral Tab EC Take 1 tablet (40 mg total) by mouth every morning before breakfast. 30 tablet 0    Fexofenadine HCl (ALLEGRA) 180 MG Oral Tab Take 1 tablet (180 mg total) by mouth daily.      Cholecalciferol (VITAMIN D3) 2000 UNITS Oral Tab Take 1 tablet (2,000 Units total) by mouth daily.      ClonazePAM (KLONOPIN) 0.5 MG Oral Tab Take 1 tablet (0.5 mg total) by mouth 2 (two) times daily as needed for Anxiety.      lisinopril (PRINIVIL,ZESTRIL) 10 MG Oral Tab Take 1 tablet (10 mg total) by mouth daily.      Lovastatin 40 MG Oral Tab Take 1 tablet (40 mg total) by mouth nightly.      Albuterol Sulfate HFA (VENTOLIN) 108 (90 BASE) MCG/ACT Inhalation Aero Soln Inhale 1-2 puffs into the lungs every 6 (six) hours as needed for Wheezing. (Patient not taking: Reported on 4/7/2025)      [DISCONTINUED] VITAMIN D OR None Entered       No current facility-administered medications on file prior to visit.

## 2025-04-07 NOTE — PATIENT INSTRUCTIONS
Next steps:  1.  We can get results for the injection medication why it was denied  Can possibly think about trying diethylpropion as another medication for metabolism and weight loss-less side effects with sleep on this  2.  Schedule a personal nutrition consultation with one of our registered dieticians     Please try to work on the following dietary changes:  Daily protein recommendation to start:  grams  Daily carbohydrate: <150g  Daily calories: 1,700-1,800  1.  Drink water with meals and throughout the day, cut down on soda and/or juice if consumed. Consider flavored water options like Bubbly, Spindrift, Hint and Katlyn.  2.  Eat breakfast daily and focus on having protein with each meal, examples include: greek yogurt, cottage cheese, hard boiled egg, whole grain toast with peanut butter.   3.  Reduce refined carbohydrates and sugars which includes items such as sweets, as well as rice, pasta, and bread and make sure to choose whole grain options when having them with just 1 serving per meal about the size of your inner palm.  4.  Consume non starchy veggies daily working towards making them a good 50% of your daily food intake. Add them to lunch and dinner consistently.  5.  Start a daily probiotic: VSL#3 is recommended, (order on line at www.vsl3.com). Take 1 capsule daily with water for 30 days, then reduce to 1 every other day (this will reduce the cost). Capsules can be left out for 2 weeks, but then must be refrigerated.      Please download lynette My Fitness Pal, LoseIt! Or Net Diary to monitor daily dietary intake and you will be able to see if you are eating the right amount of calories or too much or too little which would hinder weight loss. Additionally this will help to see your daily carbohydrate and protein intake. When you set the lynette up choose 1-2 lbs/week as a goal.  Keeping a paper food journal is an option as well to remain accountable for your choices- this is the start to mindful  eating! A low calorie diet has been consistently shown to support weight loss.     Continue or start exercising to help establish a routine. If not already exercising begin with 1 day and progress as able with long-term goal of 30 minutes 5 days a week at a minimum.     Meditation daily can help manage and control stress. Chronic stress can make weight loss difficult.  Exercising is one way to help with stress, but meditation using the CALM Mariaelena or another comparable alternative can be done in your home or place of work with little time commitment. This Mariaelena can also help work on behavior change and improve sleep. Check out the segment under Calm Masterclass and listen to The 4 Pillars of Health. A great way to begin learning about the foundation of lifestyle with practical tips to use in your every day.     Check out www.yourweightmatters.org blog for continued daily support and education along this weight loss journey!    Patient Resources:     Personal Training/Fitness Classes/Health Coaching     [facility] @ [email or web address]/healthy-[email or web address]/[email or web address]-center Full fitness center with group fitness and personal training. Discount available as client of [facility].  Health Coaching and Personal Training with Sharon Schroeder at our [facility]- individual weekly coaching with option to add personal training and small group fitness classes targeted at weight loss- [phone] and/or email @ [email or web address]  360[employer]IT [place] [email or web address].[email or web address] [facility]. Group Fitness [phone] and/or email Carlee at [email or web address]  [facility] Fitness Centers with multiple locations: Presstler (www.Healios K.K), Eat The Frog Fitness (www.Stelcor Energy.Draytek Technologies), Fit Body Bootcamp (www.Mattscloset.comboShopnlistp.Draytek Technologies), QR Pharma (www.Btarget), The Exercise  (www.exercisecoach.Draytek Technologies)     Online Fitness  Fitness   on Utube  Fit in 10 DVD series- www.ilcoc17NBC.com  Sit and Be Fit - Chair exercise series Www.sitandbefit.org  Hip Hop Fit with Abelardo Roberts at www.hiphopfit.net     Apps for on the Go Fitness  ValetAnywhere 7 Minute Workout (orange box with white 7) - free on the go HIIT training mariaelena  Peloton Mariaelena @ www.onepeloton.com     Nutrition Trackers and Tools  LoseIT! And My Fitness Pal apps and on line for tracking nutrition  NOOM - virtual health coaching  FitFoundation (healthy meals on the go) in [place] @ www.dkxszlwwddygs5nZipdial  Bistro MD @ www.bistromd.com and Ljgvjd08 (keto and low carb plans recommended) @ wwwCinnamonhdxmow91.com, Metabolic Meals @ www.Qingdao Crystech CoatingMetabolicMeals.Scylab medic - individual prepared meals to go  Gobble, Blue Apron, Home , Every Plate, Sunbasket- on line meal delivery programs for preparation at home  Meal Village in [place] for homemade meals to go @ www.mealvillage.Scylab medic  Diet Doctor @ www.dietdoctor.Scylab medic - low carb swaps  Yummwhoactually - meal prep and planning mariaelena (www.yummly.com)     Stress Management/Behavior/Mindful Eating  CALM meditation mariaelena (www.calm.com)  Headspace  Am I Hungry? Mindful eating virtual  mariaelena  Www.yourweightmatters.org - Obesity Action Coalition sponsored Blog posts daily  Motivation mariaelena (black box with white \")- daily supportive messages sent to your phone     Books/Video Education/Podcasts  Mindless Eating by Davi Alamo  Why We Get Sick by Quoc Girard (a book about insulin resistance)  Atomic Habits by Shaggy Fu (a book about taking small steps to promote greater behavior change)   Can't Hurt Me by Tommie Magana (a book exploring the power of discipline in achieving your goals)  The End of Dieting: How to Live for Life by Dr. Nishant Corona M.D. or listen to The CloudStrategies Podcast Episode 63: Understanding \"Nutritarian\" Eating w/Dr. Nishant Corona  Your Body in Balance: The New Science of Food, Hormones, and Health by Dr. Ton Adams  The Menopause Diet Plan by Gia Vanessa and  Magui Pereyra  The Complete Guide to fasting by Dr. Steve  Sugar, Salt & Fat by Carine Olivo, Ph.D, R.D.  Weight Loss Surgery Will Not Treat Food Addiction by Helen Fregoso Ph.D  The Game Changers- RenRen Headhuntingix Documentary on plant based nutrition  Fed Up - documentary about obesity (Free on Utube)  The Truth About Sugar - documentary on sugar (Free on Utube, https://youtu.be/0G2zbnkHQ1x)  The Dr. Goodman T5 Wellness Plan by Dr. Emmanuel Goodman MD  Fitlosophy Fitspiration - journal to better health (found at [employer] in fitness aisle)  What Happened to You?- a look at the impact trauma has on behavior written by Za Jackson and Dr. Martin Alonso  Whole Again by Marquez Tipton - discovering your true self after trauma  Nirmala Barnes talk on Adzilla, The Call to Courage  Podcasts: The Exam Room by the Physician's Committee, Nutrition Facts by Dr. Avelar    We are here to support you with weight loss, but please remember that you still need your primary care provider for your routine health maintenance.

## 2025-04-15 DIAGNOSIS — G25.0 ESSENTIAL TREMOR: ICD-10-CM

## 2025-04-15 RX ORDER — PRAMIPEXOLE DIHYDROCHLORIDE 0.25 MG/1
0.25 TABLET ORAL 3 TIMES DAILY
Qty: 270 TABLET | Refills: 1 | Status: SHIPPED | OUTPATIENT
Start: 2025-04-15

## 2025-04-15 NOTE — TELEPHONE ENCOUNTER
Medication:  PRAMIPEXOLE 0.25 MG Oral Tab      Date of last refill: 03/03/2025 (#270/1)  [place]     Last office visit: 03/03/2025  Due back to clinic per last office note:  ...  Date next office visit scheduled:    Future Appointments   Date Time Provider Department Center   7/22/2025  2:40 PM Racheal Sevilla, [facility]           Last OV note recommendation:       Assessment:  This is a 60 y/o male with.  He is happy with his current treatments.  Will continue pramipexole 0.25 mg in the afternoon and 0.5 mg in the evening.  We did discuss possibility of increasing this dose or starting other medication such as gabapentin but he declines at this time     Plan:  1. RLS  - Pramipexole 0.25mg in the afternoon and 0.5mg nightly           Tony Nguyễn, DO  Neurology

## 2025-05-13 ENCOUNTER — PATIENT MESSAGE (OUTPATIENT)
Dept: NEUROLOGY | Facility: CLINIC | Age: 60
End: 2025-05-13

## 2025-05-13 DIAGNOSIS — G25.0 ESSENTIAL TREMOR: ICD-10-CM

## 2025-05-13 RX ORDER — PRAMIPEXOLE DIHYDROCHLORIDE 0.25 MG/1
TABLET ORAL
Qty: 360 TABLET | Refills: 1 | Status: SHIPPED | OUTPATIENT
Start: 2025-05-13

## 2025-05-13 NOTE — TELEPHONE ENCOUNTER
Per [employer] message, pt increased his Pramipexole to four tablets daily - Requesting new RX.  Per notes below, declined Gabapentin.        LOV on 3/3/2025 -     Assessment:  This is a 60 y/o male with.  He is happy with his current treatments.  Will continue pramipexole 0.25 mg in the afternoon and 0.5 mg in the evening.  We did discuss possibility of increasing this dose or starting other medication such as gabapentin but he declines at this time     Plan:  1. RLS  - Pramipexole 0.25mg in the afternoon and 0.5mg nightly

## 2025-08-22 ENCOUNTER — EKG ENCOUNTER (OUTPATIENT)
Dept: LAB | Facility: HOSPITAL | Age: 60
End: 2025-08-22
Attending: EMERGENCY MEDICINE

## 2025-08-22 DIAGNOSIS — Z01.818 PREOP EXAMINATION: Primary | ICD-10-CM

## 2025-08-22 LAB
ATRIAL RATE: 69 BPM
P AXIS: 12 DEGREES
P-R INTERVAL: 144 MS
Q-T INTERVAL: 398 MS
QRS DURATION: 80 MS
QTC CALCULATION (BEZET): 426 MS
R AXIS: 45 DEGREES
T AXIS: 49 DEGREES
VENTRICULAR RATE: 69 BPM

## 2025-08-22 PROCEDURE — 93005 ELECTROCARDIOGRAM TRACING: CPT

## 2025-08-22 PROCEDURE — 93010 ELECTROCARDIOGRAM REPORT: CPT | Performed by: INTERNAL MEDICINE

## 2025-08-27 ENCOUNTER — HOSPITAL ENCOUNTER (OUTPATIENT)
Dept: CV DIAGNOSTICS | Facility: HOSPITAL | Age: 60
Discharge: HOME | End: 2025-08-27
Attending: INTERNAL MEDICINE

## 2025-08-27 DIAGNOSIS — Z01.818 PREOP TESTING: ICD-10-CM

## 2025-08-27 LAB
% OF MAX PREDICTED HR: 90 %
MAX DIASTOLIC BP: 72 MMHG
MAX HEART RATE: 148 BPM
MAX PREDICTED HEART RATE: 162 BPM
MAX SYSTOLIC BP: 176 MMHG
MAX WORK LOAD: 91

## 2025-08-27 PROCEDURE — 93018 CV STRESS TEST I&R ONLY: CPT | Performed by: INTERNAL MEDICINE

## 2025-08-27 PROCEDURE — 93016 CV STRESS TEST SUPVJ ONLY: CPT | Performed by: INTERNAL MEDICINE

## 2025-08-27 PROCEDURE — 93017 CV STRESS TEST TRACING ONLY: CPT | Performed by: INTERNAL MEDICINE

## (undated) DIAGNOSIS — G25.0 ESSENTIAL TREMOR: ICD-10-CM

## (undated) DEVICE — HEMOCLIP HORIZON MED 002200

## (undated) DEVICE — KENDALL SCD EXPRESS SLEEVES, KNEE LENGTH, MEDIUM: Brand: KENDALL SCD

## (undated) DEVICE — SUTURE VICRYL 3-0 SH

## (undated) DEVICE — LAP CHOLE/APPY CDS-LF: Brand: MEDLINE INDUSTRIES, INC.

## (undated) DEVICE — PAD SACRAL SPAN AID

## (undated) DEVICE — PMI DISPOSABLE CHOLANGIOGRAPH CATHETER 7", FOR TRADITIONAL OPEN PROCEDURE: Brand: PMI

## (undated) DEVICE — ABSORBABLE HEMOSTAT (OXIDIZED REGENERATED CELLULOSE, U.S.P.): Brand: SURGICEL

## (undated) DEVICE — DRAIN RELIAVAC 100CC

## (undated) DEVICE — SUTURE PROLENE 2-0 MH

## (undated) DEVICE — SYRINGE 10ML LL TIP

## (undated) DEVICE — DRAIN ROUND HUBLESS 15FR

## (undated) DEVICE — SUTURE PROLENE 3-0 SH

## (undated) DEVICE — REM POLYHESIVE ADULT PATIENT RETURN ELECTRODE: Brand: VALLEYLAB

## (undated) DEVICE — DRAPE EQUIPMENT INTRATEMP THER

## (undated) DEVICE — CHLORAPREP 26ML APPLICATOR

## (undated) DEVICE — STANDARD HYPODERMIC NEEDLE,POLYPROPYLENE HUB: Brand: MONOJECT

## (undated) DEVICE — HEMOCLIP HORIZON LG 004200

## (undated) DEVICE — SPONGE: SPECIALTY PEANUT XR 100/CS: Brand: MEDICAL ACTION INDUSTRIES

## (undated) DEVICE — ABC BEND-A-BEAM 3"(7.6CM) HANDCONTROL MALLEABLE HANDPIECE: Brand: ABC BEND-A-BEAM

## (undated) DEVICE — SUTURE SILK 3-0 SH

## (undated) DEVICE — OMNIPAQUE 300 POLYB 50ML

## (undated) DEVICE — LAMINECTOMY ARM CRADLE FOAM POSITIONER: Brand: CARDINAL HEALTH

## (undated) DEVICE — STERILE SYNTHETIC POLYISOPRENE POWDER-FREE SURGICAL GLOVES WITH HYDROGEL COATING, SMOOTH FINISH, STRAIGHT FINGER: Brand: PROTEXIS

## (undated) DEVICE — SUTURE PROLENE 5-0 RB-1

## (undated) DEVICE — TOWEL OR BLU 16X26 STRL

## (undated) DEVICE — LIGHT HANDLE

## (undated) DEVICE — GOWN,SIRUS,FABRIC-REINFORCED,X-LARGE: Brand: MEDLINE

## (undated) DEVICE — SOL  .9 1000ML BTL

## (undated) DEVICE — SUTURE ETHILON 3-0 PS-2

## (undated) DEVICE — SUTURE PROLENE 4-0 RB-1

## (undated) DEVICE — SYRINGE 50ML LL TIP

## (undated) DEVICE — SYRINGE 30ML LL TIP

## (undated) DEVICE — STERILE POLYISOPRENE POWDER-FREE SURGICAL GLOVES: Brand: PROTEXIS

## (undated) DEVICE — LIGASURE IMPACT OPEN DEVICE

## (undated) DEVICE — 3M™ TEGADERM™ TRANSPARENT FILM DRESSING, 1626W, 4 IN X 4-3/4 IN (10 CM X 12 CM), 50 EACH/CARTON, 4 CARTON/CASE: Brand: 3M™ TEGADERM™

## (undated) DEVICE — SUTURE PDS II 1 TP-1

## (undated) DEVICE — SUTURE VICRYL 2-0 SH

## (undated) DEVICE — PROXIMATE SKIN STAPLERS (35 WIDE) CONTAINS 35 STAINLESS STEEL STAPLES (FIXED HEAD): Brand: PROXIMATE

## (undated) NOTE — ED AVS SNAPSHOT
Luis Davey   MRN: [id number]    Department:  [facility] Emergency Department   Date of Visit:  11/1/2017           Disclosure     Insurance plans vary and the physician(s) referred by the ER may not be covered by your plan.  Please contact your If you have been prescribed any medication(s), please fill your prescription right away and begin taking the medication(s) as directed    If the emergency physician has read X-rays, these will be re-interpreted by a radiologist.  If there is a significant

## (undated) NOTE — ED AVS SNAPSHOT
[facility] Emergency Department    [place]  [address] [address]    Phone:  [phone]    Fax:  [phone] [facility]   MRN: [id number]    Department:  [facility] Emergency Department   Date of Visit:  5/12/ IF THERE IS ANY CHANGE OR WORSENING OF YOUR CONDITION, CALL YOUR PRIMARY CARE PHYSICIAN AT ONCE OR RETURN IMMEDIATELY TO THE EMERGENCY DEPARTMENT.     If you have been prescribed any medication(s), please fill your prescription right away and begin taking t

## (undated) NOTE — ED AVS SNAPSHOT
Miroslava Reeves   MRN: [id number]    Department:  [facility] Emergency Department   Date of Visit:  1/20/2020           Disclosure     Insurance plans vary and the physician(s) referred by the ER may not be covered by your plan.  Please contact your tell this physician (or your personal doctor if your instructions are to return to your personal doctor) about any new or lasting problems. The primary care or specialist physician will see patients referred from the [facility] Emergency Department.  Jerry Us

## (undated) NOTE — IP AVS SNAPSHOT
[facility] [place] [address] [place], [address] ~ [phone]                Discharge Summary   1/22/2017    [address]           Admission Information        Provider Department    1/22/2017 Garett Neff MD  5nw-A predniSONE 20 MG Tabs   Last time this was given:  1/30/2017  8:21 AM   Commonly known as:  Bella Bentley   Start taking on:  1/31/2017   Next dose due:  1/31        Take 1 tablet (20 mg total) by mouth daily with breakfast.   Stop taking on:  2/2/2017    Bod Take 2.5 mg by nebulization every 4 (four) hours as needed for Wheezing.                             Budesonide-Formoterol Fumarate 160-4.5 MCG/ACT Aero   Commonly known as:  SYMBICORT   Next dose due:  1/30/17        Inhale 2 puffs into the lungs 2 (two) omeprazole 20 MG Cpdr   Commonly known as:  PRILOSEC   Next dose due:  1/31/17        Take 20 mg by mouth every morning before breakfast.                            TAB-A-PRATIK Tabs   Last time this was given:  1 tablet on 1/30/2017 [phone]          Follow up with Jaylyn Luther MD In 1 week.     Specialties:  PULMONARY DISEASES, Intensivist    Contact information:    [address] [facility]  [phone]        Immunization History as of 1/30/2017  Rev 4.5 (01/26/17)  101      Radiology Exams     None      Patient Belongings       Most Recent Value    All belongings returned to patient at discharge Pt's bedside belongings    Medications Sent Home None to return    Medications Returned:           Charlene Medication Side Effects - Medications to be taken at home  As your caregivers, we want you to be aware of the medications you are prescribed to take and their potential SIDE EFFECTS.  Your nurse will review your medications with you before you are dischar Non-Narcotic Pain Medications     ibuprofen (MOTRIN) 200 MG Oral Tab       Use: Treat pain, fever, inflammation   Most common side effects: Stomach upset   What to report to your healthcare team: Stomach upset, unresolved pain           GI Medications What to report to your healthcare provider: Increase in blood sugar, high blood pressure, fluid retention, mood changes, stomach upset/pain, headache, dizziness           Anti-Histamines     Fexofenadine HCl (ALLEGRA) 180 MG Oral Tab         Use:  Treat Al

## (undated) NOTE — MR AVS SNAPSHOT
After Visit Summary   2/3/2017    Wild Lyn    MRN: [id number]           Visit Information        Provider Department Dept Phone    2/3/2017  9:00 PM Bed1  [facility] [phone]      Allergies as of 2/3/2017  Reviewed on: 1/22/2017    All Soln Inhale 1-2 puffs into the lungs every 6 (six) hours as needed for Wheezing. ClonazePAM (KLONOPIN) 0.5 MG Oral Tab Take 0.5 mg by mouth 2 (two) times daily as needed for Anxiety. FLUoxetine HCl (PROZAC) 20 MG Oral Cap Take 20 mg by mouth daily. Click on the Activate your Account if you have an activation code in the box under the *New User? section. Enter your [place] Activation Code exactly as it appears below. You will not need to use this code after you have completed the sign-up process.

## (undated) NOTE — LETTER
Mirtha Pfeiffer [address]  [address] [facility] [address], [address] [facility]  Authorization for Surgical Operation and Procedure     Date:___________                                                                                                         Time:__________ and/or blood products. The following are some, but not all, of the potential risks that can occur: fever and allergic reactions, hemolytic reactions, transmission of diseases such as Hepatitis, AIDS and Cytomegalovirus (CMV) and fluid overload.   In the ev (or a person authorized to consent on my behalf). The surgeon or my attending physician will determine when the applicable recovery period ends for purposes of reinstating the DNAR order.   10. Patients having a sterilization procedure: I understand that if 2. As the patient asking for anesthesia services, I agree to:  a. Allow the anesthesiologist (anesthesia doctor) to give me medicine and do additional procedures as necessary.  Some examples are: Starting or using an “IV” to give me medicine, fluids or bloo Very rare risks include infection, bleeding, seizure, irregular heart rhythms and nerve injury. 7. Regional Anesthesia (“spinal”, “epidural”, & “nerve blocks”):   I understand that rare but potential complications include headache, bleeding, infection, sei

## (undated) NOTE — LETTER
Mirtha Pfeiffer [address]  [address] [facility] [address], [address] [facility]  Authorization for Surgical Operation and Procedure     Date:___________                                                                                                         Time:__________ potential risks that can occur: fever and allergic reactions, hemolytic reactions, transmission of diseases such as Hepatitis, AIDS and Cytomegalovirus (CMV) and fluid overload.   In the event that I wish to have an autologous transfusion of my own blood, o attending physician will determine when the applicable recovery period ends for purposes of reinstating the DNAR order.   10. Patients having a sterilization procedure: I understand that if the procedure is successful the results will be permanent and it wi a. Allow the anesthesiologist (anesthesia doctor) to give me medicine and do additional procedures as necessary.  Some examples are: Starting or using an “IV” to give me medicine, fluids or blood during my procedure, and having a breathing tube placed to he 7. Regional Anesthesia (“spinal”, “epidural”, & “nerve blocks”): I understand that rare but potential complications include headache, bleeding, infection, seizure, irregular heart rhythms, and nerve injury.     I can change my mind about having anesthesia

## (undated) NOTE — ED AVS SNAPSHOT
Yasmeen Vaca   MRN: [id number]    Department:  [facility] Emergency Department   Date of Visit:  1/7/2019           Disclosure     Insurance plans vary and the physician(s) referred by the ER may not be covered by your plan.  Please contact your tell this physician (or your personal doctor if your instructions are to return to your personal doctor) about any new or lasting problems. The primary care or specialist physician will see patients referred from the [facility] Emergency Department.  Scar Kunz

## (undated) NOTE — ED AVS SNAPSHOT
[facility] Emergency Department    [place]  [address] [address]    Phone:  [phone]    Fax:  [phone] [facility]   MRN: [id number]    Department:  [facility] Emergency Department   Date of Visit:  5/12/ covered by your plan. Please contact your insurance company to determine coverage for follow-up care and referrals.     [address] [facility] [employer] [place] [phone]  Pediatric [phone] Emergency Department   [phone]       To by a radiologist.  If there is a significant change in your reading, you will be contacted. Please make sure we have your correct phone number before you leave. After you leave, you should follow the attached instructions.      I have read and understand th Yvette 112. Imaging Results         CT ABDOMEN PELVIS IV CONTRAST, NO ORAL (ER) (Final result) Result time:  05/12/17 17:18:47    Final result    Impression:    CONCLUSION:  No acute process.            Dictated by: Jesus Sagastume MD on ADRENALS:  Normal.  No mass or enlargement. AORTA/VASCULAR:  Normal.  No aneurysm or dissection. RETROPERITONEUM:  Normal.  No mass or adenopathy. BOWEL/MESENTERY:  Normal.  No visible mass, obstruction, or bowel wall thickening.     ABDOMINAL WALL